# Patient Record
Sex: FEMALE | Race: WHITE | NOT HISPANIC OR LATINO | Employment: OTHER | URBAN - METROPOLITAN AREA
[De-identification: names, ages, dates, MRNs, and addresses within clinical notes are randomized per-mention and may not be internally consistent; named-entity substitution may affect disease eponyms.]

---

## 2017-10-19 ENCOUNTER — GENERIC CONVERSION - ENCOUNTER (OUTPATIENT)
Dept: OTHER | Facility: OTHER | Age: 67
End: 2017-10-19

## 2017-10-31 ENCOUNTER — ALLSCRIPTS OFFICE VISIT (OUTPATIENT)
Dept: OTHER | Facility: OTHER | Age: 67
End: 2017-10-31

## 2017-10-31 DIAGNOSIS — E03.9 HYPOTHYROIDISM: ICD-10-CM

## 2017-10-31 DIAGNOSIS — E78.5 HYPERLIPIDEMIA: ICD-10-CM

## 2017-10-31 DIAGNOSIS — Z12.31 ENCOUNTER FOR SCREENING MAMMOGRAM FOR MALIGNANT NEOPLASM OF BREAST: ICD-10-CM

## 2017-10-31 DIAGNOSIS — Z78.0 ASYMPTOMATIC MENOPAUSAL STATE: ICD-10-CM

## 2017-10-31 DIAGNOSIS — Z00.00 ENCOUNTER FOR GENERAL ADULT MEDICAL EXAMINATION WITHOUT ABNORMAL FINDINGS: ICD-10-CM

## 2017-11-01 NOTE — PROGRESS NOTES
Assessment  1  History of gastroesophageal reflux (GERD) (V12 79) (Z87 19)   2  History of Anxiety (300 00) (F41 9)   3  History of arthritis (V13 4) (Z87 39)   4  History of migraine (V12 49) (Z86 69)   5  History of osteoporosis (V13 59) (Z87 39)   6  History of urinary incontinence (V13 09) (Z87 898)   7  History of Gallbladder Surgery   8  History of Shoulder Surgery   9  History of Incisional Breast Biopsy   10  History of Tonsillectomy   11  History of Oral Surgery Tooth Extraction [place] Tooth   12  History of Knee Surgery   13  Family history of lupus erythematosus (V19 8) (Z84 0) : Mother   15  Family history of cardiac disorder (V17 49) (Z82 49) : Mother, Father   13  Family history of diabetes mellitus (V18 0) (Z83 3) : Father   12  Family history of malignant neoplasm of breast (V16 3) (Z80 3) : Maternal Aunt, Maternal    Cousin   16  Never a smoker   18  Social alcohol use (Z78 9)   19  No illicit drug use   20  Caffeine use (V49 89) (F15 90)   21  Hypothyroidism, adult (244 9) (E03 9)   22  Arthritis of knee (716 96) (M17 10)    Plan  Encounter for screening mammogram for malignant neoplasm of breast    · * MAMMO SCREENING BILATERAL W CAD; Status:Hold For - Scheduling; Requested  for:31Oct2017;   Encounter for special screening examination for genitourinary disorder    · *VB - Urinary Incontinence Screen (Dx Z13 89 Screen for UI) ; every 1 year; Last  [id number]; Next 22UNA8222; [address] Maintenance    · (1) CBC/ PLT (NO DIFF); Status:Active; Requested for:31Oct2017;    · (1) VITAMIN B12; Status:Active; Requested for:31Oct2017;   Hyperlipidemia    · (1) ALT (SGPT); Status:Active; Requested for:31Oct2017;    · (1) LIPID PANEL FASTING W DIRECT LDL REFLEX; Status:Active; Requested  for:31Oct2017;   Hypothyroidism, adult    · (1) TSH WITH FT4 REFLEX; Status:Active;  Requested for:31Oct2017;   Need for immunization against influenza    · Fluzone High-Dose 0 5 ML Intramuscular Suspension Prefilled Syringe  PMH: Encounter for screening for malignant neoplasm of colon    · COLONOSCOPY; Status:Active; Requested for:31Oct2017;   Postmenopausal    · * DXA BONE DENSITY SPINE HIP AND PELVIS; Status:Hold For - Scheduling; Requested  for:31Oct2017; Unlinked    · *VB - Fall Risk Assessment  (Dx Z13 89 Screen for Neurologic Disorder) ; every 1 year; Next 11FCB6142; Status:Active   · *VB - Urinary Incontinence Screen (Dx Z13 89 Screen for UI); Status:Complete -  Retrospective By Protocol Authorization;   Done: 95GMU5056 01:22PM   · *VB-Depression Screening ; every 1 year; Next 18BDF7558; Status:Active    Discussion/Summary  Discussion Summary:   1) thyroid : to recheck on labsincreased weight: discussed dietPt  to start B12 because ON ppi  To check levelsarthritis particularly of both knees right over left sees orthopedic to use NSAID cream and not pill because of the side effects      Counseling Documentation With Imm: The was counseled regarding diagnostic results,-- instructions for management  total time of encounter was 30 minutes  Chief Complaint  Chief Complaint Free Text Note Form: Patient is here today to establish care  work done 6/2017 at [facility] refills needed today  discuss meds      History of Present Illness  HPI: Severe arthritis using asprincream  wishes to change to local nsaids to try cream      Review of Systems  Preventive Quality 65 Older:   Preventive Quality 65 and Older: Falls Risk: The patient fell 2 times in the past 12 months   The patient is currently asymptomatic Symptoms Include: Urinary Incontinence Symptoms includes: urinary incontinence, urinary frequency and nocturia, but no incomplete bladder emptying, no urinary urgency, no urinary hesitancy, no dysuria, no straining, no weak stream, no intermittent stream, no post-void dribbling, no vaginal pressure and no vaginal dryness    Complete-Female:   Constitutional: No fever, no chills, feels well, no tiredness, no recent weight gain or weight loss  Eyes: dryness of the eyes-- and-- see eye doctor, has dry eyes, but-- No complaints of eye pain, no red eyes, no eyesight problems, no discharge, no dry eyes, no itching of eyes,-- no eye pain,-- no eyesight problems,-- eyes not red,-- no purulent discharge from the eyes-- and-- no itching of the eyes  ENT: no complaints of earache, no loss of hearing, no nose bleeds, no nasal discharge, no sore throat, no hoarseness  Cardiovascular: No complaints of slow heart rate, no fast heart rate, no chest pain, no palpitations, no leg claudication, no lower extremity edema  Respiratory: No complaints of shortness of breath, no wheezing, no cough, no SOB on exertion, no orthopnea, no PND  Gastrointestinal: vomiting-- and-- gerd , had endoscopy occ vomitting, but-- No complaints of abdominal pain, no constipation, no nausea or vomiting, no diarrhea, no bloody stools,-- no abdominal pain,-- no nausea,-- no constipation,-- no diarrhea-- and-- no blood in stools  Genitourinary: No complaints of dysuria, no incontinence, no pelvic pain, no dysmenorrhea, no vaginal discharge or bleeding  Musculoskeletal: pain see ortho, but-- no myalgias--    The patient presents with complaints of bilateral knee arthralgias  Integumentary: skin lesion-- and-- sees derm needs apt , but-- No complaints of skin rash or lesions, no itching, no skin wounds, no breast pain or lump  Neurological: No complaints of headache, no confusion, no convulsions, no numbness, no dizziness or fainting, no tingling, no limb weakness, no difficulty walking  Psychiatric: Not suicidal, no sleep disturbance, no anxiety or depression, no change in personality, no emotional problems  Endocrine: no muscle weakness,-- no hot flashes-- and-- no deepening of the voice  no feelings of weakness   Hematologic/Lymphatic: no swollen glands,-- no tendency for easy bleeding-- and-- no tendency for easy bruising  Active Problems  1  Encounter for screening mammogram for malignant neoplasm of breast (V76 12)   (Z12 31)   2  Encounter for special screening examination for genitourinary disorder (V81 6) (Z13 89)   3  Postmenopausal (V49 81) (Z78 0)    Past Medical History  1  History of Anxiety (300 00) (F41 9)   2  History of arthritis (V13 4) (Z87 39)   3  History of gastroesophageal reflux (GERD) (V12 79) (Z87 19)   4  History of migraine (V12 49) (Z86 69)   5  History of osteoporosis (V13 59) (Z87 39)   6  History of urinary incontinence (V13 09) (J70 734)    Surgical History  1  History of Gallbladder Surgery   2  History of Incisional Breast Biopsy   3  History of Knee Surgery   4  History of Oral Surgery Tooth Extraction [place] Tooth   5  History of Shoulder Surgery   6  History of Tonsillectomy    Family History  Mother    1  Family history of cardiac disorder (V17 49) (Z82 49)   2  Family history of lupus erythematosus (V19 8) (Z84 0)  Father    3  Family history of cardiac disorder (V17 49) (Z82 49)   4  Family history of diabetes mellitus (V18 0) (Z83 3)  Maternal Aunt    5  Family history of malignant neoplasm of breast (V16 3) (Z80 3)  Maternal Cousin    6  Family history of malignant neoplasm of breast (V16 3) (Z80 3)    Social History   · Caffeine use (V49 89) (F15 90)   · Never a smoker   · No illicit drug use   · Social alcohol use (Z78 9)    Current Meds   1  Aleve TABS; TAKE 2 TABLET EVERY 12 HOURS AS NEEDED; Therapy: (Recorded:31Oct2017) to Recorded   2  Aspir-81 TBEC; TAKE 1 TABLET DAILY; Therapy: (Recorded:31Oct2017) to Recorded   3  Fexofenadine HCl - 180 MG Oral Tablet; TAKE 1 TABLET DAILY; Therapy: (Recorded:31Oct2017) to Recorded   4  LORazepam 0 5 MG Oral Tablet; take 1 tablet daily as needed; Therapy: (Recorded:31Oct2017) to Recorded   5  NexIUM 40 MG Oral Capsule Delayed Release; take 1 capsule daily; Therapy: (Recorded:31Oct2017) to Recorded   6   Nystatin 483324 UNIT/GM External Cream; APPLY ONCE DAILY TO AFFECTED AREA(S)   WHEN NEEDED; Therapy: (Recorded:88Frj3577) to Recorded   7  Nystatin 998384 UNIT/GM External Powder; APPLY ONCE TIMES DAILY TO AFFECTED   AREA(S); Therapy: (Recorded:31Oct2017) to Recorded   8  Pazeo 0 7 % Ophthalmic Solution; use as directed; Therapy: (Recorded:31Oct2017) to Recorded    Allergies  1  Sulfa Drugs  2  No Known Environmental Allergies   3  No Known Food Allergies    Vitals  Signs   Recorded: 10IHB3041 01:32PM   Temperature: 98 2 F, Tympanic  Heart Rate: 64, R Radial  Pulse Quality: Regular, R Radial  Respiration: 14  Systolic: 925, RUE, Sitting  Diastolic: 70, RUE, Sitting  Height: 5 ft 1 42 in  Weight: 183 lb 12 8 oz  BMI Calculated: 34 25  BSA Calculated: 1 83  O2 Saturation: 98, RA    Physical Exam    Constitutional   General appearance: No acute distress, well appearing and well nourished  -- heavy  Eyes   Conjunctiva and lids: No swelling, erythema or discharge  Pupils and irises: Equal, round and reactive to light  Ears, Nose, Mouth, and Throat   External inspection of ears and nose: Normal     Nasal mucosa, septum, and turbinates: Normal without edema or erythema  Oropharynx: Normal with no erythema, edema, exudate or lesions  Pulmonary   Respiratory effort: No increased work of breathing or signs of respiratory distress  Auscultation of lungs: Clear to auscultation  Cardiovascular   Palpation of heart: Normal PMI, no thrills  Auscultation of heart: Normal rate and rhythm, normal S1 and S2, without murmurs  Examination of extremities for edema and/or varicosities: Normal     Carotid pulses: Normal     Abdomen   Abdomen: Non-tender, no masses  Liver and spleen: No hepatomegaly or splenomegaly  Musculoskeletal   Gait and station: Abnormal  -- Atalgic gait  Digits and nails: Normal without clubbing or cyanosis      Inspection/palpation of joints, bones, and muscles: Normal     Skin   Skin and subcutaneous tissue: Abnormal  -- To see during for several pigmented lesions  Neurologic   Cranial nerves: Cranial nerves 2-12 intact  -- mild atalgic gait  Reflexes: 2+ and symmetric  Sensation: No sensory loss  Psychiatric   Orientation to person, place, and time: Normal     Mood and affect: Normal          Results/Data  *VB - Urinary Incontinence Screen (Dx Z13 89 Screen for UI) 31Oct2017 01:22PM Jalen Hsieh     Test Name Result Flag Reference   Urinary Incontinence Assessment 31Oct2017       PHQ-2 Adult Depression Screening 31Oct2017 01:20PM User, Ahs     Test Name Result Flag Reference   PHQ-2 Adult Depression Score 0     Over the last two weeks, how often have you been bothered by any of the following problems?   Little interest or pleasure in doing things: Not at all - 0  Feeling down, depressed, or hopeless: Not at all - 0   PHQ-2 Adult Depression Screening Negative       Falls Risk Assessment (Dx Z13 89 Screen for Neurologic Disorder) 31Oct2017 01:20PM User, Ahs     Test Name Result Flag Reference   Falls Risk      2 or more falls past year       Signatures   Electronically signed by : Dariana Rodrigues MD; Oct 31 2017  2:53PM EST                       (Author)

## 2017-11-09 ENCOUNTER — GENERIC CONVERSION - ENCOUNTER (OUTPATIENT)
Dept: OTHER | Facility: OTHER | Age: 67
End: 2017-11-09

## 2017-11-20 ENCOUNTER — ALLSCRIPTS OFFICE VISIT (OUTPATIENT)
Dept: OTHER | Facility: OTHER | Age: 67
End: 2017-11-20

## 2017-11-21 NOTE — PROGRESS NOTES
Assessment    1  Allergic sinusitis (477 9) (J30 9)   2  Acute sinusitis (461 9) (J01 90)   3  Skin disorder (709 9) (L98 9)   4  Acute URI (465 9) (J06 9)    Plan  Acute sinusitis, Allergic sinusitis    · Zithromax Z-Sekou 250 MG Oral Tablet (Azithromycin); take 2 tablets day 1 then 1tablet daily x 4 days  Acute URI    · Drink at least 6 glasses of clear liquids a day ; Status:Complete;   Done: 35DOZ0773   · Good hand washing is one of the best ways to control the spread of germs  ;Status:Complete;   Done: 15JEE1230    Discussion/Summary    To continue daily allegra dailymucinex DM and take plain robutussinfluids and restdaily while on abxnext week if not improved  tried to get derm at sched - can't be seen until may - will see if we can get her in somewhere sooner - will call [facility] in [place]  Possible side effects of new medications were reviewed with the patient/guardian today  The treatment plan was reviewed with the patient/guardian  The patient/guardian understands and agrees with the treatment plan      Chief Complaint  pt  presents for chest congestion, loss of voice and headache and sinus pressure  Started Saturday  pt  has been taking Mucinex DM, helping a little  Pt takes allegra on daily basis  History of Present Illness  HPI: c/o cough and chest congestion - mucus was thick over weekend with blood tinged mucus, now clearmucinex DMfever or chillscontacts latelyrecent plane travelmyalgias and night sweats at onset, now afebrile and no chills/sweats      Review of Systems   Constitutional: no fever,-- not feeling poorly,-- no chills-- and-- not feeling tired  ENT: nasal discharge-- and-- hoarseness, but-- no earache,-- no nosebleeds-- and-- no sore throat  Cardiovascular: no chest pain,-- no intermittent leg claudication,-- no palpitations-- and-- no lower extremity edema    Respiratory: cough-- and-- shortness of breath during exertion, but-- no shortness of breath-- and-- no wheezing  Gastrointestinal: no abdominal pain,-- no nausea,-- no vomiting-- and-- no constipation  Genitourinary: no dysuria  Musculoskeletal: arthralgias-- and-- knee pain  Integumentary: no rashes  Neurological: no headache,-- no numbness-- and-- no dizziness  ROS reviewed  Active Problems    1  Arthritis of knee (716 96) (M17 10)   2  Encounter for screening mammogram for malignant neoplasm of breast (V76 12) (Z12 31)   3  Hyperlipidemia (272 4) (E78 5)   4  Hypothyroidism, adult (244 9) (E03 9)   5  Postmenopausal (V49 81) (Z78 0)    Past Medical History  Active Problems And Past Medical History Reviewed: The active problems and past medical history were reviewed and updated today  Surgical History  Surgical History Reviewed: The surgical history was reviewed and updated today  Social History     · Caffeine use (V49 89) (F15 90)   · Never a smoker   · No illicit drug use   · Social alcohol use (Z78 9)  The social history was reviewed and updated today  Family History  Family History Reviewed: The family history was reviewed and updated today  Current Meds   1  Aleve TABS; TAKE 2 TABLET EVERY 12 HOURS AS NEEDED; Therapy: (Recorded:31Oct2017) to Recorded   2  Aspir-81 TBEC; TAKE 1 TABLET DAILY; Therapy: (Recorded:31Oct2017) to Recorded   3  Diclofenac Sodium 1 % Transdermal Gel; APPLY TWICE DAILY AS NEEDED; Therapy: 46APR7175 to (Last Rx:02Nov2017)  Requested for: 58FUD1798 Ordered   4  Fexofenadine HCl - 180 MG Oral Tablet; TAKE 1 TABLET DAILY; Therapy: (Recorded:31Oct2017) to Recorded   5  LORazepam 0 5 MG Oral Tablet; take 1 tablet daily as needed; Therapy: (Recorded:31Oct2017) to Recorded   6  NexIUM 40 MG Oral Capsule Delayed Release; take 1 capsule daily; Therapy: (Recorded:31Oct2017) to Recorded   7  Nystatin 037683 UNIT/GM External Cream; APPLY ONCE DAILY TO AFFECTED AREA(S) WHEN NEEDED; Therapy: (Recorded:31Oct2017) to Recorded   8   Nystatin 927246 UNIT/GM External Powder; APPLY ONCE TIMES DAILY TO AFFECTED AREA(S); Therapy: (Recorded:31Oct2017) to Recorded   9  Pazeo 0 7 % Ophthalmic Solution; use as directed; Therapy: (Recorded:31Oct2017) to Recorded   10  Vitamin B12 TABS; TAKE 1 TABLET DAILY AS DIRECTED; Therapy: (Recorded:20Nov2017) to Recorded    The medication list was reviewed and updated today  Allergies  1  Sulfa Drugs  2  No Known Environmental Allergies   3  No Known Food Allergies    Vitals   Recorded: [id number] 02:31PM Recorded: 19XMS0867 02:07PM   Temperature  98 2 F, Tympanic   Heart Rate  76   Systolic 356, LUE, Sitting 154, LUE, Sitting   Diastolic 86, LUE, Sitting 94, LUE, Sitting   Height  5 ft 1 42 in   Weight  182 lb 2 oz   BMI Calculated  33 94   BSA Calculated  1 82   O2 Saturation  97, RA       Physical Exam   Constitutional  General appearance: No acute distress, well appearing and well nourished  Eyes  Conjunctiva and lids: No swelling, erythema or discharge  Pupils and irises: Equal, round and reactive to light  Ears, Nose, Mouth, and Throat  External inspection of ears and nose: Normal    Otoscopic examination: Tympanic membranes translucent with normal light reflex  Canals patent without erythema  Nasal mucosa, septum, and turbinates: Normal without edema or erythema  -- mild erythema  Pulmonary  Auscultation of lungs: Clear to auscultation  -- no wheeze  Cardiovascular  Auscultation of heart: Normal rate and rhythm, normal S1 and S2, without murmurs  Examination of extremities for edema and/or varicosities: Normal    Lymphatic  Palpation of lymph nodes in neck: No lymphadenopathy  Musculoskeletal  Gait and station: Normal    Skin  Skin and subcutaneous tissue: Normal without rashes or lesions  Neurologic  Cranial nerves: Cranial nerves 2-12 intact     Psychiatric  Orientation to person, place, and time: Normal    Mood and affect: Normal          Future Appointments    Date/Time Provider Specialty Site   01/31/2018 11:30 AM Paradise [facility] Jennifer Vilma       Signatures   Electronically signed by : Brianna Carrillo, [facility]; Nov 20 2017  2:43PM EST                       (Author)    Electronically signed by :  Astrid Augustine MD; Nov 20 2017  3:51PM EST                       (Author)

## 2017-12-01 ENCOUNTER — GENERIC CONVERSION - ENCOUNTER (OUTPATIENT)
Dept: INTERNAL MEDICINE CLINIC | Age: 67
End: 2017-12-01

## 2017-12-21 ENCOUNTER — GENERIC CONVERSION - ENCOUNTER (OUTPATIENT)
Dept: OTHER | Facility: OTHER | Age: 67
End: 2017-12-21

## 2017-12-21 ENCOUNTER — GENERIC CONVERSION - ENCOUNTER (OUTPATIENT)
Dept: INTERNAL MEDICINE CLINIC | Age: 67
End: 2017-12-21

## 2018-01-13 VITALS
DIASTOLIC BLOOD PRESSURE: 86 MMHG | HEART RATE: 76 BPM | WEIGHT: 182.13 LBS | HEIGHT: 61 IN | TEMPERATURE: 98.2 F | OXYGEN SATURATION: 97 % | BODY MASS INDEX: 34.39 KG/M2 | SYSTOLIC BLOOD PRESSURE: 142 MMHG

## 2018-01-13 VITALS
HEART RATE: 64 BPM | BODY MASS INDEX: 34.7 KG/M2 | OXYGEN SATURATION: 98 % | HEIGHT: 61 IN | TEMPERATURE: 98.2 F | RESPIRATION RATE: 14 BRPM | DIASTOLIC BLOOD PRESSURE: 70 MMHG | SYSTOLIC BLOOD PRESSURE: 146 MMHG | WEIGHT: 183.8 LBS

## 2018-01-20 ENCOUNTER — LAB CONVERSION - ENCOUNTER (OUTPATIENT)
Dept: OTHER | Facility: OTHER | Age: 68
End: 2018-01-20

## 2018-01-20 LAB
ALT SERPL W P-5'-P-CCNC: 13 U/L (ref 6–29)
CHOLEST SERPL-MCNC: 243 MG/DL
CHOLEST/HDLC SERPL: 3.8 (CALC)
DEPRECATED RDW RBC AUTO: 14.5 % (ref 11–15)
HCT VFR BLD AUTO: 40.6 % (ref 35–45)
HDLC SERPL-MCNC: 64 MG/DL
HGB BLD-MCNC: 12.9 G/DL (ref 11.7–15.5)
LDL CHOLESTEROL (HISTORICAL): 161 MG/DL (CALC)
MCH RBC QN AUTO: 26.8 PG (ref 27–33)
MCHC RBC AUTO-ENTMCNC: 31.8 G/DL (ref 32–36)
MCV RBC AUTO: 84.4 FL (ref 80–100)
NON-HDL-CHOL (CHOL-HDL) (HISTORICAL): 179 MG/DL (CALC)
PLATELET # BLD AUTO: 321 THOUSAND/UL (ref 140–400)
PMV BLD AUTO: 11.6 FL (ref 7.5–12.5)
RBC # BLD AUTO: 4.81 MILLION/UL (ref 3.8–5.1)
TRIGL SERPL-MCNC: 74 MG/DL
TSH SERPL DL<=0.05 MIU/L-ACNC: 3.33 MIU/L (ref 0.4–4.5)
WBC # BLD AUTO: 11.7 THOUSAND/UL (ref 3.8–10.8)

## 2018-01-23 DIAGNOSIS — E78.5 HYPERLIPIDEMIA: ICD-10-CM

## 2018-01-23 DIAGNOSIS — D72.9 DISORDER OF WHITE BLOOD CELLS: ICD-10-CM

## 2018-01-31 DIAGNOSIS — M17.10 PRIMARY LOCALIZED OSTEOARTHROSIS OF LOWER LEG, UNSPECIFIED LATERALITY: Primary | ICD-10-CM

## 2018-01-31 DIAGNOSIS — F40.243 FEAR OF FLYING: ICD-10-CM

## 2018-01-31 DIAGNOSIS — Z86.19 HISTORY OF FUNGAL SKIN INFECTION: ICD-10-CM

## 2018-01-31 RX ORDER — NYSTATIN 100000 [USP'U]/G
POWDER TOPICAL
COMMUNITY
End: 2018-01-31 | Stop reason: SDUPTHER

## 2018-01-31 RX ORDER — NYSTATIN 100000 [USP'U]/G
POWDER TOPICAL DAILY
Qty: 15 G | Refills: 1 | Status: SHIPPED | OUTPATIENT
Start: 2018-01-31 | End: 2018-05-09 | Stop reason: DRUGHIGH

## 2018-01-31 RX ORDER — LORAZEPAM 0.5 MG/1
0.5 TABLET ORAL DAILY PRN
Qty: 30 TABLET | Refills: 0 | Status: SHIPPED | OUTPATIENT
Start: 2018-01-31 | End: 2018-06-28 | Stop reason: SDUPTHER

## 2018-01-31 RX ORDER — LORAZEPAM 0.5 MG/1
1 TABLET ORAL DAILY PRN
COMMUNITY
End: 2018-01-31 | Stop reason: SDUPTHER

## 2018-01-31 NOTE — TELEPHONE ENCOUNTER
Patient informed that prescription is ready for  in the [facility] [place] office

## 2018-02-19 ENCOUNTER — TELEPHONE (OUTPATIENT)
Dept: INTERNAL MEDICINE CLINIC | Age: 68
End: 2018-02-19

## 2018-02-19 RX ORDER — ESOMEPRAZOLE MAGNESIUM 40 MG/1
1 CAPSULE, DELAYED RELEASE ORAL DAILY
COMMUNITY
End: 2018-03-08 | Stop reason: SDUPTHER

## 2018-02-19 RX ORDER — NYSTATIN 100000 U/G
CREAM TOPICAL
COMMUNITY
End: 2018-05-09 | Stop reason: DRUGHIGH

## 2018-02-19 RX ORDER — AZITHROMYCIN 250 MG/1
TABLET, FILM COATED ORAL DAILY
COMMUNITY
Start: 2017-11-20 | End: 2018-07-25 | Stop reason: ALTCHOICE

## 2018-02-19 RX ORDER — FEXOFENADINE HCL 180 MG/1
1 TABLET ORAL AS NEEDED
COMMUNITY
End: 2020-07-01 | Stop reason: SDUPTHER

## 2018-02-19 RX ORDER — PREDNISONE 10 MG/1
TABLET ORAL
COMMUNITY
Start: 2017-11-22 | End: 2018-07-25 | Stop reason: ALTCHOICE

## 2018-02-19 RX ORDER — NAPROXEN SODIUM 220 MG
220 TABLET ORAL DAILY PRN
COMMUNITY

## 2018-02-19 NOTE — TELEPHONE ENCOUNTER
Patient called and stated that she was on vacation in the [facility] last week and was hospitalized from February 11th at night and released on the 13th, they diagnosed her symptoms as a kidney/bladder infection which caused gastrinites and dehydration  She wanted to inform you before she had her 3m follow up visit with you tomorrow afternoon  She would like a call back if you receive this message before seeing her  Thank you

## 2018-02-20 ENCOUNTER — OFFICE VISIT (OUTPATIENT)
Dept: INTERNAL MEDICINE CLINIC | Facility: CLINIC | Age: 68
End: 2018-02-20
Payer: MEDICARE

## 2018-02-20 VITALS
HEIGHT: 61 IN | OXYGEN SATURATION: 98 % | TEMPERATURE: 97.7 F | DIASTOLIC BLOOD PRESSURE: 70 MMHG | HEART RATE: 68 BPM | BODY MASS INDEX: 33.64 KG/M2 | SYSTOLIC BLOOD PRESSURE: 136 MMHG | WEIGHT: 178.2 LBS

## 2018-02-20 DIAGNOSIS — R10.30 LOWER ABDOMINAL PAIN: Primary | ICD-10-CM

## 2018-02-20 DIAGNOSIS — Z12.11 SCREENING FOR COLON CANCER: ICD-10-CM

## 2018-02-20 DIAGNOSIS — R79.89 ABNORMAL LFTS: ICD-10-CM

## 2018-02-20 LAB
BACTERIA UR QL AUTO: ABNORMAL /HPF
BILIRUB UR QL STRIP: NEGATIVE
CLARITY UR: CLEAR
COLOR UR: YELLOW
GLUCOSE UR STRIP-MCNC: NEGATIVE MG/DL
HGB UR QL STRIP.AUTO: NEGATIVE
HYALINE CASTS #/AREA URNS LPF: ABNORMAL /LPF
KETONES UR STRIP-MCNC: NEGATIVE MG/DL
LEUKOCYTE ESTERASE UR QL STRIP: ABNORMAL
NITRITE UR QL STRIP: NEGATIVE
NON-SQ EPI CELLS URNS QL MICRO: ABNORMAL /HPF
PH UR STRIP.AUTO: 6 [PH] (ref 4.5–8)
PROT UR STRIP-MCNC: NEGATIVE MG/DL
RBC #/AREA URNS AUTO: ABNORMAL /HPF
SL AMB  POCT GLUCOSE, UA: NEGATIVE
SL AMB LEUKOCYTE ESTERASE,UA: ABNORMAL
SL AMB POCT BILIRUBIN,UA: NEGATIVE
SL AMB POCT BLOOD,UA: ABNORMAL
SL AMB POCT CLARITY,UA: CLEAR
SL AMB POCT COLOR,UA: CLEAR
SL AMB POCT KETONES,UA: NEGATIVE
SL AMB POCT NITRITE,UA: NEGATIVE
SL AMB POCT PH,UA: 5.5
SL AMB POCT SPECIFIC GRAVITY,UA: 1.01
SL AMB POCT URINE PROTEIN: NEGATIVE
SL AMB POCT UROBILINOGEN: 0.2
SP GR UR STRIP.AUTO: 1.01 (ref 1–1.03)
UROBILINOGEN UR QL STRIP.AUTO: 0.2 E.U./DL
WBC #/AREA URNS AUTO: ABNORMAL /HPF

## 2018-02-20 PROCEDURE — 99214 OFFICE O/P EST MOD 30 MIN: CPT | Performed by: INTERNAL MEDICINE

## 2018-02-20 PROCEDURE — 81001 URINALYSIS AUTO W/SCOPE: CPT | Performed by: INTERNAL MEDICINE

## 2018-02-20 PROCEDURE — 81002 URINALYSIS NONAUTO W/O SCOPE: CPT | Performed by: INTERNAL MEDICINE

## 2018-02-20 PROCEDURE — 87086 URINE CULTURE/COLONY COUNT: CPT | Performed by: INTERNAL MEDICINE

## 2018-02-20 NOTE — PROGRESS NOTES
Assessment/Plan:    1  Abdominal Pain  She was recently admitted in a hospital in [facility] for her abdominal pain  She was treated with a 5 day course of Ciprofloxacin, Aero-OM and Ponstan  Liver Function tests were ordered and will be drawn prior to her next appointment  A Stool test for culture, ova, parasite, and white cells will also be drawn prior to her next appointment  A CT scan of the abdomen with and without contrast was also ordered  2  Weight  She was advised to lose 10% of her weight due to her fatty liver and go on a low fat diet  3  UA was performed today which showed trace blood without nitrates was sent for culture  4  Dietary Discussion  Her diet was discussed today in the context of her current abdominal pain  She was recommended to eliminate milk, coffee, chocolate, raw green vegetables, and citrus fruits  She was also encouraged to eat a lot of yogurt  5  Health Maintenance  She will follow up with us in 1 week  Diagnoses and all orders for this visit:    Lower abdominal pain  -     Comprehensive metabolic panel; Future  -     Cancel: CT abdomen pelvis w contrast; Future  -     CT abdomen pelvis w wo contrast; Future  -     Ambulatory referral to Gastroenterology; Future  -     Stool Enteric Bacterial Panel by PCR; Future  -     Ova and parasite examination; Future  -     White Blood Cells, Stool by Gram Stain; Future  -     POCT urine dip    Abnormal LFTs  -     Hepatic function panel; Future    Screening for colon cancer  -     Ambulatory referral to Gastroenterology; Future    Other orders  -     naproxen sodium (ALEVE) 220 MG tablet; Take by mouth  -     predniSONE 10 mg tablet; Take by mouth  -     azithromycin (ZITHROMAX Z-JENN) 250 mg tablet; Take by mouth daily  -     fexofenadine (ALLEGRA) 180 MG tablet; Take 1 tablet by mouth daily  -     esomeprazole (NEXIUM) 40 MG capsule;  Take 1 capsule by mouth daily  -     nystatin (MYCOSTATIN) cream; Apply topically Subjective:      Patient ID: Sandra Mcarthur is a 75 y o  female  Jamie Jesus is a 75year old female who presents today for follow up after her recent hospitalization for abdominal pain in the [facility] while on vacation  She reports the doctors in the [facility] told her she had a UTI which caused nephritis which caused gastritis  She was admitted to the hospital and treated with a 5 day course of Ciprofloxacin, and Aero-OM and Ponstan  She reports she has diarrhea and cramps ascending from the stomach and around to her back  She also feels somewhat depressed since her return from the [facility] due to her experience at the hospital          The following portions of the patient's history were reviewed and updated as appropriate: allergies, current medications, past family history, past medical history, past social history, past surgical history and problem list     Review of Systems   Constitutional: Positive for activity change, appetite change, fatigue and unexpected weight change  Negative for chills, diaphoresis and fever  HENT: Negative for congestion, drooling, ear discharge, ear pain, facial swelling, hearing loss and mouth sores  Eyes: Negative for photophobia, pain, discharge, redness, itching and visual disturbance  Respiratory: Negative for cough, choking, chest tightness, shortness of breath, wheezing and stridor  Cardiovascular: Negative for chest pain, palpitations and leg swelling  Gastrointestinal: Positive for abdominal distention, abdominal pain, diarrhea and nausea  Negative for anal bleeding, blood in stool, constipation, rectal pain and vomiting  Genitourinary: Positive for flank pain  Negative for decreased urine volume, hematuria, pelvic pain and urgency  Musculoskeletal: Positive for arthralgias and back pain  Negative for gait problem, joint swelling, myalgias, neck pain and neck stiffness  Neurological: Negative    Negative for dizziness, tremors, syncope and speech difficulty  Hematological: Negative for adenopathy  Psychiatric/Behavioral: Negative  Objective:      /70 (BP Location: Left arm, Patient Position: Sitting, Cuff Size: Large)   Pulse 68   Temp 97 7 °F (36 5 °C) (Oral)   Ht 5' 0 87" (1 546 m)   Wt 80 8 kg (178 lb 3 2 oz)   SpO2 98%   BMI 33 82 kg/m²          Physical Exam   Constitutional: She is oriented to person, place, and time  She appears well-developed and well-nourished  She appears distressed (abdominal pain)  HENT:   Head: Normocephalic and atraumatic  Right Ear: External ear normal    Left Ear: External ear normal    Eyes: Conjunctivae and EOM are normal  Pupils are equal, round, and reactive to light  Neck: Normal range of motion  Neck supple  Cardiovascular: Normal rate, regular rhythm, normal heart sounds and intact distal pulses  Pulmonary/Chest: No respiratory distress  She has no wheezes  She has no rales  She exhibits no tenderness  Abdominal: She exhibits no distension and no mass  There is tenderness (diffuse no rebound, abdomen is soft)  There is no rebound and no guarding  Musculoskeletal: She exhibits no edema, tenderness or deformity  Neurological: She is alert and oriented to person, place, and time  She has normal reflexes  Coordination normal    Skin: Skin is warm and dry  No rash noted  No erythema  No pallor  Scribe Signature and Attestaion:  By signing my name below, Awais Zay attest that this documentation has been prepared under the direction and in the presence of Dr Rancho Rowe MD  Electronically signed: Karen Roy  2/20/18  Provider Attestation:  I, Dr Rancho Rowe, personally performed the services described in this documentation  All medical record entries made by the kingaibacacia were at my direction and in my presence   I have reviewed the chart and discharge instructions (if applicable) and agree that the record reflects my personal performance and is accurate and complete   Dr Clark Guerrero MD  2/20/18

## 2018-02-22 ENCOUNTER — TELEPHONE (OUTPATIENT)
Dept: INTERNAL MEDICINE CLINIC | Age: 68
End: 2018-02-22

## 2018-02-22 ENCOUNTER — HOSPITAL ENCOUNTER (OUTPATIENT)
Dept: CT IMAGING | Facility: HOSPITAL | Age: 68
Discharge: HOME | End: 2018-02-22
Payer: MEDICARE

## 2018-02-22 DIAGNOSIS — R10.30 LOWER ABDOMINAL PAIN: ICD-10-CM

## 2018-02-22 DIAGNOSIS — N39.0 URINARY TRACT INFECTION WITHOUT HEMATURIA, SITE UNSPECIFIED: Primary | ICD-10-CM

## 2018-02-22 LAB — BACTERIA UR CULT: ABNORMAL

## 2018-02-22 PROCEDURE — 74178 CT ABD&PLV WO CNTR FLWD CNTR: CPT

## 2018-02-22 RX ADMIN — IOHEXOL 100 ML: 350 INJECTION, SOLUTION INTRAVENOUS at 07:17

## 2018-02-22 NOTE — TELEPHONE ENCOUNTER
I spoke to the patient and the related that her urine culture only revealed 10,000 colonies which was not considered to be positive  And therefore I do not feel she needs antibiotics at this time  But because of her severe urinary sepsis from several weeks ago I advised her to repeat her urinalysis if they still showed the white cells in the urine I will reconsider giving her another course of antibiotics

## 2018-02-23 NOTE — TELEPHONE ENCOUNTER
Message left for patient on her listed home phone voicemail informing her that an order for the repeat urinalysis is ready for  at the [facility] office

## 2018-02-25 LAB
ALBUMIN SERPL-MCNC: 4.1 G/DL (ref 3.6–5.1)
ALBUMIN SERPL-MCNC: 4.1 G/DL (ref 3.6–5.1)
ALBUMIN/GLOB SERPL: 1.4 (CALC) (ref 1–2.5)
ALBUMIN/GLOB SERPL: 1.4 (CALC) (ref 1–2.5)
ALP SERPL-CCNC: 94 U/L (ref 33–130)
ALP SERPL-CCNC: 94 U/L (ref 33–130)
ALT SERPL-CCNC: 13 U/L (ref 6–29)
ALT SERPL-CCNC: 13 U/L (ref 6–29)
APPEARANCE UR: CLEAR
AST SERPL-CCNC: 8 U/L (ref 10–35)
AST SERPL-CCNC: 8 U/L (ref 10–35)
BACTERIA UR QL AUTO: ABNORMAL /HPF
BILIRUB DIRECT SERPL-MCNC: 0.1 MG/DL
BILIRUB INDIRECT SERPL-MCNC: 0.3 MG/DL (CALC) (ref 0.2–1.2)
BILIRUB SERPL-MCNC: 0.4 MG/DL (ref 0.2–1.2)
BILIRUB SERPL-MCNC: 0.4 MG/DL (ref 0.2–1.2)
BILIRUB UR QL STRIP: NEGATIVE
BUN SERPL-MCNC: 28 MG/DL (ref 7–25)
BUN/CREAT SERPL: 20 (CALC) (ref 6–22)
CALCIUM SERPL-MCNC: 9.3 MG/DL (ref 8.6–10.4)
CHLORIDE SERPL-SCNC: 107 MMOL/L (ref 98–110)
CO2 SERPL-SCNC: 24 MMOL/L (ref 20–31)
COLOR UR: YELLOW
CREAT SERPL-MCNC: 1.38 MG/DL (ref 0.5–0.99)
GLOBULIN SER CALC-MCNC: 3 G/DL (CALC) (ref 1.9–3.7)
GLOBULIN SER CALC-MCNC: 3 G/DL (CALC) (ref 1.9–3.7)
GLUCOSE SERPL-MCNC: 94 MG/DL (ref 65–99)
GLUCOSE UR QL STRIP: NEGATIVE
HGB UR QL STRIP: NEGATIVE
HYALINE CASTS #/AREA URNS LPF: ABNORMAL /LPF
KETONES UR QL STRIP: NEGATIVE
LEUKOCYTE ESTERASE UR QL STRIP: ABNORMAL
NITRITE UR QL STRIP: NEGATIVE
PH UR STRIP: 6 [PH] (ref 5–8)
POTASSIUM SERPL-SCNC: 4.2 MMOL/L (ref 3.5–5.3)
PROT SERPL-MCNC: 7.1 G/DL (ref 6.1–8.1)
PROT SERPL-MCNC: 7.1 G/DL (ref 6.1–8.1)
PROT UR QL STRIP: NEGATIVE
RBC #/AREA URNS HPF: ABNORMAL /HPF
SL AMB EGFR AFRICAN AMERICAN: 45 ML/MIN/1.73M2
SL AMB EGFR NON AFRICAN AMERICAN: 39 ML/MIN/1.73M2
SODIUM SERPL-SCNC: 142 MMOL/L (ref 135–146)
SP GR UR STRIP: 1.02 (ref 1–1.03)
SQUAMOUS #/AREA URNS HPF: ABNORMAL /HPF
WBC #/AREA URNS HPF: ABNORMAL /HPF

## 2018-02-27 ENCOUNTER — TELEPHONE (OUTPATIENT)
Dept: INTERNAL MEDICINE CLINIC | Facility: CLINIC | Age: 68
End: 2018-02-27

## 2018-02-27 DIAGNOSIS — N18.2 CRI (CHRONIC RENAL INSUFFICIENCY), STAGE 2 (MILD): ICD-10-CM

## 2018-02-27 DIAGNOSIS — Z12.11 SCREENING FOR COLON CANCER: ICD-10-CM

## 2018-02-27 DIAGNOSIS — B34.9 VIREMIA: Primary | ICD-10-CM

## 2018-02-27 NOTE — PROGRESS NOTES
Discussed the results of CT scan with patient  There are no significant changes except for mild swelling of the mesenteric area  Which could account for abdominal pain  This was quite nonspecific  Patient is improved she still is quite fatigued she has no further loose stools  Patient will be seen soon and her laboratory studies will be Re done    In particular she had some mild renal insufficiency secondary to dehydration will recheck numbers before next visit

## 2018-02-27 NOTE — TELEPHONE ENCOUNTER
Needs script for additional bw per Dr Obie Castleman    Please fax to [facility]    Please call her when it is sent so she knows she can go  Is this a fasting test

## 2018-02-27 NOTE — TELEPHONE ENCOUNTER
Dr Nargis Orellana placed orders for CBC with diff, electrolytes, renal function - prior to next appointment  Also referral for gastroenterology  Patient does NOT need to fast   If she goes to [facility] lab orders are in chart  If she goes elsewhere, please print orders  Please call patient back with above  I did not call her

## 2018-02-27 NOTE — TELEPHONE ENCOUNTER
I have printed out the orders: Patient needs orders faxed to [phone] [address]   i can not find a [facility] [address]  I lmom for patient to call back advising what [facility] facility she goes to

## 2018-03-01 LAB
ALBUMIN SERPL-MCNC: 4.1 G/DL (ref 3.6–5.1)
BASOPHILS # BLD AUTO: 91 CELLS/UL (ref 0–200)
BASOPHILS NFR BLD AUTO: 0.9 %
BUN SERPL-MCNC: 19 MG/DL (ref 7–25)
BUN/CREAT SERPL: 18 (CALC) (ref 6–22)
CALCIUM SERPL-MCNC: 9.4 MG/DL (ref 8.6–10.4)
CHLORIDE SERPL-SCNC: 107 MMOL/L (ref 98–110)
CO2 SERPL-SCNC: 28 MMOL/L (ref 20–31)
CREAT SERPL-MCNC: 1.08 MG/DL (ref 0.5–0.99)
EOSINOPHIL # BLD AUTO: 394 CELLS/UL (ref 15–500)
EOSINOPHIL NFR BLD AUTO: 3.9 %
ERYTHROCYTE [DISTWIDTH] IN BLOOD BY AUTOMATED COUNT: 13.6 % (ref 11–15)
GLUCOSE SERPL-MCNC: 116 MG/DL (ref 65–99)
HCT VFR BLD AUTO: 38.8 % (ref 35–45)
HGB BLD-MCNC: 12.4 G/DL (ref 11.7–15.5)
LYMPHOCYTES # BLD AUTO: 3363 CELLS/UL (ref 850–3900)
LYMPHOCYTES NFR BLD AUTO: 33.3 %
MCH RBC QN AUTO: 26.7 PG (ref 27–33)
MCHC RBC AUTO-ENTMCNC: 32 G/DL (ref 32–36)
MCV RBC AUTO: 83.6 FL (ref 80–100)
MONOCYTES # BLD AUTO: 535 CELLS/UL (ref 200–950)
MONOCYTES NFR BLD AUTO: 5.3 %
NEUTROPHILS # BLD AUTO: 5717 CELLS/UL (ref 1500–7800)
NEUTROPHILS NFR BLD AUTO: 56.6 %
PHOSPHATE SERPL-MCNC: 3.4 MG/DL (ref 2.1–4.3)
PLATELET # BLD AUTO: 311 THOUSAND/UL (ref 140–400)
PMV BLD REES-ECKER: 12.2 FL (ref 7.5–12.5)
POTASSIUM SERPL-SCNC: 3.7 MMOL/L (ref 3.5–5.3)
RBC # BLD AUTO: 4.64 MILLION/UL (ref 3.8–5.1)
SL AMB EGFR AFRICAN AMERICAN: 61 ML/MIN/1.73M2
SL AMB EGFR NON AFRICAN AMERICAN: 53 ML/MIN/1.73M2
SODIUM SERPL-SCNC: 144 MMOL/L (ref 135–146)
WBC # BLD AUTO: 10.1 THOUSAND/UL (ref 3.8–10.8)

## 2018-03-08 ENCOUNTER — OFFICE VISIT (OUTPATIENT)
Dept: INTERNAL MEDICINE CLINIC | Age: 68
End: 2018-03-08

## 2018-03-08 VITALS
HEIGHT: 61 IN | WEIGHT: 176.8 LBS | DIASTOLIC BLOOD PRESSURE: 70 MMHG | BODY MASS INDEX: 33.38 KG/M2 | OXYGEN SATURATION: 97 % | TEMPERATURE: 98.3 F | HEART RATE: 78 BPM | SYSTOLIC BLOOD PRESSURE: 146 MMHG

## 2018-03-08 DIAGNOSIS — R82.998 URINE WBC INCREASED: Primary | ICD-10-CM

## 2018-03-08 PROCEDURE — 99213 OFFICE O/P EST LOW 20 MIN: CPT | Performed by: INTERNAL MEDICINE

## 2018-03-08 RX ORDER — LANOLIN ALCOHOL/MO/W.PET/CERES
1000 CREAM (GRAM) TOPICAL DAILY
COMMUNITY

## 2018-03-08 RX ORDER — MELATONIN
1000 DAILY
COMMUNITY

## 2018-03-08 NOTE — PROGRESS NOTES
Assessment/Plan:    1  Abdominal Pain  She reported to us on 2/20/18 regarding abdominal pain after a recent trip to [facility]  Her renal function panel reported an increased Sl Amb Glucose level at 116 on 2/28/18  UA showed trace leukocyte esterase in the urine and elevated WBC in the urine on 2/24/18  Her CT scan on 2/22/18 showed a Mild central mesenteric edema with tiny associated lymph nodes which is a very nonspecific finding and could be present on a reactive basis  No other potential acute inflammatory changes in the abdomen or pelvis  No obstructive uropathy  We will repeat another Urine culture to assess her again  Pt  Still mildly dehydrated  2  Hypertension  She was instructed to eliminate salt from her diet, including potato chips and pretzels  She was advised to eliminate caffeine completely, but only drink 1 cup decaf tea daily if needed  She could also add apple juice or low-salt V8 Juice to her diet  3  Health Maintenance  She will follow up with us at her next scheduled appointment  Diagnoses and all orders for this visit:    Urine WBC increased  -     UA w Reflex to Microscopic w Reflex to Culture - Clinic Collect    Other orders  -     cyanocobalamin (VITAMIN B-12) 1,000 mcg tablet; Take 1,000 mcg by mouth daily  -     cholecalciferol (VITAMIN D3) 1,000 units tablet; Take 1,000 Units by mouth daily          Subjective:      Patient ID: Steph Cruz is a 75 y o  female  Suzie Da Silva is a 75year old female who presents today for a 2 week follow up regarding her abdominal pain  She reports she feels much better in regards to her abdominal pain  She notes that she lost about 10 lbs  due to this infection  She reports her BP was high today but attributes that to her usage of caffeine before her appointment today  She notes she has general fatigue, polyuria, swelling of her knees, and general back pain  She follows with an eye doctor closely          The following portions of the patient's history were reviewed and updated as appropriate: allergies, current medications, past family history, past medical history, past social history, past surgical history and problem list     Review of Systems   Constitutional: Positive for unexpected weight change (lost  10 lbs  with illness)  Negative for fatigue (always) and fever  HENT: Negative for congestion, hearing loss, mouth sores, postnasal drip, rhinorrhea and sinus pain  Eyes: Negative for pain, discharge, redness and itching  Respiratory: Negative for cough, choking, chest tightness, shortness of breath, wheezing and stridor  Cardiovascular: Negative for chest pain, palpitations and leg swelling  Gastrointestinal: Negative for abdominal pain, anal bleeding, blood in stool, constipation, diarrhea, nausea, rectal pain and vomiting  Genitourinary: Positive for frequency  Negative for difficulty urinating, dysuria, hematuria and pelvic pain  Musculoskeletal: Positive for back pain (chronic) and joint swelling (B/L knees)  Negative for myalgias, neck pain and neck stiffness  Neurological: Negative for dizziness, tremors, seizures, syncope, speech difficulty, weakness, light-headedness, numbness and headaches  Hematological: Negative for adenopathy  Psychiatric/Behavioral: Negative  Objective:      /70 (BP Location: Left arm, Patient Position: Sitting, Cuff Size: Large)   Pulse 78   Temp 98 3 °F (36 8 °C) (Oral)   Ht 5' 1 38" (1 559 m)   Wt 80 2 kg (176 lb 12 8 oz)   SpO2 97% Comment: Room Air  BMI 33 00 kg/m²          Physical Exam   Constitutional: She is oriented to person, place, and time  She appears well-developed and well-nourished  HENT:   Head: Normocephalic and atraumatic  Eyes: Conjunctivae and EOM are normal    Neck: Normal range of motion  Neck supple  No tracheal deviation present  No thyromegaly present  Cardiovascular: Normal rate, regular rhythm and normal heart sounds  Pulmonary/Chest: Effort normal and breath sounds normal  No respiratory distress  She has no wheezes  She has no rales  She exhibits no tenderness  Abdominal: She exhibits no distension and no mass  There is no tenderness  There is no rebound and no guarding  Musculoskeletal: She exhibits no edema or deformity  Neurological: She is alert and oriented to person, place, and time  She has normal reflexes  No cranial nerve deficit  Coordination normal    Skin: Skin is warm and dry  No rash noted  No erythema  No pallor  Psychiatric: She has a normal mood and affect  Her behavior is normal  Judgment and thought content normal          Scribe Signature and Attestation:  By signing my name below, Alireza Wood attest that this documentation has been prepared under the direction and in the presence of Dr Fitz Oliver MD  Electronically signed: Karen Gonzalez  3/8/18    Provider Attestation:  I, Dr Fitz Oliver, personally performed the services described in this documentation  All medical record entries made by the scribacacia were at my direction and in my presence  I have reviewed the chart and discharge instructions (if applicable) and agree that the record reflects my personal performance and is accurate and complete  Dr Fitz Oliver MD  3/8/18

## 2018-03-08 NOTE — PATIENT INSTRUCTIONS
1  We will repeat another Urine culture to assess her again  2  She was instructed to eliminate salt from her diet, including potato chips and pretzels  She was advised to eliminate caffeine completely, but only drink 1 cup decaf tea daily if needed  She could also add apple juice or low-salt V8 Juice to her diet  3  She will follow up with us at her next scheduled appointment

## 2018-03-09 ENCOUNTER — APPOINTMENT (OUTPATIENT)
Dept: LAB | Facility: HOSPITAL | Age: 68
End: 2018-03-09
Payer: MEDICARE

## 2018-03-09 ENCOUNTER — TELEPHONE (OUTPATIENT)
Dept: INTERNAL MEDICINE CLINIC | Facility: CLINIC | Age: 68
End: 2018-03-09

## 2018-03-09 LAB
BACTERIA UR QL AUTO: ABNORMAL /HPF
BILIRUB UR QL STRIP: NEGATIVE
CLARITY UR: CLEAR
COLOR UR: YELLOW
GLUCOSE UR STRIP-MCNC: NEGATIVE MG/DL
HGB UR QL STRIP.AUTO: NEGATIVE
HYALINE CASTS #/AREA URNS LPF: ABNORMAL /LPF
KETONES UR STRIP-MCNC: NEGATIVE MG/DL
LEUKOCYTE ESTERASE UR QL STRIP: ABNORMAL
NITRITE UR QL STRIP: NEGATIVE
NON-SQ EPI CELLS URNS QL MICRO: ABNORMAL /HPF
PH UR STRIP.AUTO: 6 [PH] (ref 4.5–8)
PROT UR STRIP-MCNC: NEGATIVE MG/DL
RBC #/AREA URNS AUTO: ABNORMAL /HPF
SP GR UR STRIP.AUTO: 1.02 (ref 1–1.03)
UROBILINOGEN UR QL STRIP.AUTO: 0.2 E.U./DL
WBC #/AREA URNS AUTO: ABNORMAL /HPF

## 2018-03-09 PROCEDURE — 81001 URINALYSIS AUTO W/SCOPE: CPT | Performed by: INTERNAL MEDICINE

## 2018-03-09 NOTE — TELEPHONE ENCOUNTER
New patient we for culture  Her urinalysis again showed white cells no blood  If the culture is positive will Rx again with antibiotics    At this time I feel the patient she should see a urologist will let her know after cultures back

## 2018-05-08 DIAGNOSIS — Z12.11 SCREENING FOR COLON CANCER: Primary | ICD-10-CM

## 2018-05-09 ENCOUNTER — OFFICE VISIT (OUTPATIENT)
Dept: INTERNAL MEDICINE CLINIC | Age: 68
End: 2018-05-09
Payer: MEDICARE

## 2018-05-09 VITALS
WEIGHT: 178.4 LBS | SYSTOLIC BLOOD PRESSURE: 146 MMHG | HEART RATE: 72 BPM | TEMPERATURE: 98.5 F | BODY MASS INDEX: 32.83 KG/M2 | HEIGHT: 62 IN | DIASTOLIC BLOOD PRESSURE: 78 MMHG | OXYGEN SATURATION: 96 %

## 2018-05-09 DIAGNOSIS — R01.1 CARDIAC MURMUR: ICD-10-CM

## 2018-05-09 DIAGNOSIS — R55 NEAR SYNCOPE: ICD-10-CM

## 2018-05-09 DIAGNOSIS — B49 FUNGAL INFECTION: Primary | ICD-10-CM

## 2018-05-09 DIAGNOSIS — E66.9 OBESITY (BMI 30-39.9): ICD-10-CM

## 2018-05-09 PROCEDURE — 93000 ELECTROCARDIOGRAM COMPLETE: CPT | Performed by: INTERNAL MEDICINE

## 2018-05-09 PROCEDURE — 99214 OFFICE O/P EST MOD 30 MIN: CPT | Performed by: INTERNAL MEDICINE

## 2018-05-09 RX ORDER — NYSTATIN 100000 [USP'U]/G
POWDER TOPICAL 3 TIMES DAILY
Qty: 56.7 G | Refills: 0 | Status: SHIPPED | OUTPATIENT
Start: 2018-05-09 | End: 2019-02-14 | Stop reason: SDUPTHER

## 2018-05-09 NOTE — PATIENT INSTRUCTIONS
1  She will have a TSH, CBC, CMP, and Sed Rate drawn at her earliest convenience to assess her near-syncope episode  2  She will schedule an appointment with Dr Stacia Wilson for further evaluation of her murmur  3  She will have an Echo completed at her earliest convenience  4  She will continue using Nystatin powder as needed  5   She was advised to minimize processed foods and increase natural foods in her diet  She was encouraged to minimize her white starches and carbohydrates from her diet  6  She will have an HgbA1C level drawn prior to her next appointment  7  She will follow up with us in 8 weeks

## 2018-05-09 NOTE — PROGRESS NOTES
Assessment/Plan:    1  Near Syncope episode and Light-headedness  She reports near-syncopal episodes of light headedness occasionally for the past 3 weeks  She has not felt this way before  Her neurological exam was normal including cerebellar  We spoke to her cardiologist, Dr Henry Olmos, who will call her and schedule an appointment with her  Physical exam revealed a cardiac murmur  Her Standing BP was 146/78 when rechecked today  Her ECG performed in the office showed regular sinus rhythm, essentially normal   She will have a TSH, CBC, CMP, and Sed Rate drawn at her earliest convenience to assess her near-syncope episode  2  Cardiac Murmur  A soft murmur was heard on auscultation today  She will have an echo completed at her earliest convenience and then follow this with her cardiologist  She has a history of LVH  3  Obesity  She is obese with a BMI of 33 16  She was advised to minimize processed foods and increase natural foods in her diet  She was encouraged to minimize her white starches and carbohydrates from her diet  She will have an HgbA1C level drawn prior to her next appointment  4  Fungal infection  She was given a refill for her Nystatin powder as she continues to use it  5  Health Maintenance  She will follow up with us in 8 weeks  Diagnoses and all orders for this visit:    Fungal infection  -     nystatin (MYCOSTATIN) powder; Apply topically 3 (three) times a day    Obesity (BMI 30-39 9)  -     TSH, 3rd generation with T4 reflex; Future  -     HEMOGLOBIN A1C W/ EAG ESTIMATION; Future    Near syncope  -     TSH, 3rd generation with T4 reflex; Future  -     CBC and differential; Future  -     Comprehensive metabolic panel; Future  -     Sedimentation rate, automated; Future    Cardiac murmur  -     Echo complete with contrast if indicated; Future  -     POCT ECG          Subjective:      Patient ID: Gladys Anthony is a 75 y o  female      Jhonatan Bai is a 75year old female who presents today for a 2 month follow up regarding her HTN  She reports she has been feeling light-headedness occasionally for the past 3 weeks  She reports it is spontaneous and does not come with any specific activities or movements  She reports she has never felt this before  She notes it may be because she does not watch her diet especially when she is busy working  She reports she feels anxious when this happens and denies general palpitations  She reports she has frequency in urination due to her incontinence  She notes she saw Dr Emilee Rosario who removed some skin lesions off her body and will continue to follow with him regularly  She reports she has joint pains and has swollen feet sometimes  She notes her orthopedic specialist told her that she needs her right knee replaced  She follows with her eye doctor regularly  She follows with her cardiologist, Dr Trudy Hester, regularly as well  The following portions of the patient's history were reviewed and updated as appropriate: allergies, current medications, past family history, past medical history, past social history, past surgical history and problem list     Review of Systems   Constitutional: Negative for chills, diaphoresis, fatigue, fever and unexpected weight change  HENT: Negative for congestion, facial swelling, hearing loss, mouth sores, postnasal drip, rhinorrhea and sinus pressure  Eyes: Negative for pain, discharge, redness and itching  Respiratory: Negative  Cardiovascular: Negative  Gastrointestinal: Negative  Endocrine: Negative for polydipsia  Genitourinary: Positive for frequency (incontinant)  Negative for difficulty urinating and dysuria  Musculoskeletal: Positive for arthralgias (knees )  Negative for myalgias  Skin:        Saw Dr Ildefonso Tafoya   Allergic/Immunologic: Negative for environmental allergies  Neurological: Positive for light-headedness (near syncope)   Negative for dizziness, tremors, seizures, syncope, facial asymmetry, speech difficulty, weakness, numbness and headaches  Hematological: Negative  Psychiatric/Behavioral: Negative  Objective:      /72 (BP Location: Left arm, Patient Position: Sitting, Cuff Size: Adult)   Pulse 72   Temp 98 5 °F (36 9 °C) (Oral)   Ht 5' 1 5" (1 562 m)   Wt 80 9 kg (178 lb 6 4 oz)   SpO2 96%   BMI 33 16 kg/m²          Physical Exam   Constitutional: She is oriented to person, place, and time  She appears well-developed and well-nourished  No distress  obese   HENT:   Head: Normocephalic and atraumatic  Right Ear: External ear normal    Left Ear: External ear normal    Nose: Nose normal    Mouth/Throat: Oropharynx is clear and moist  No oropharyngeal exudate  Eyes: Conjunctivae and EOM are normal  Right eye exhibits no discharge  Left eye exhibits no discharge  No scleral icterus  Neck: Normal range of motion  Neck supple  No JVD present  No tracheal deviation present  No thyromegaly present  Cardiovascular: Normal rate and regular rhythm  Murmur (soft murmur lsb) heard  Pulmonary/Chest: Effort normal and breath sounds normal  No respiratory distress  She has no wheezes  She has no rales  She exhibits no tenderness  Abdominal: She exhibits no distension  There is no tenderness  There is no rebound  Musculoskeletal: She exhibits no edema or tenderness  Neurological: She is alert and oriented to person, place, and time  She has normal reflexes  No cranial nerve deficit  Coordination normal    Full neuro exam is normal including cerebellar exam, strength  Good  git normal   Skin: Skin is warm and dry  No rash noted  She is not diaphoretic  No erythema  No pallor  Psychiatric: She has a normal mood and affect   Her behavior is normal  Judgment and thought content normal          Scribe Signature and Attestation:  By signing my name below, Seda Resendez attest that this documentation has been prepared under the direction and in the presence of Dr Jenaro Hernandez MD  Electronically signed: Karen Richey  5/9/18    Provider Attestation:  I, Dr Jenaro Hernandez, personally performed the services described in this documentation  All medical record entries made by the kingaibe were at my direction and in my presence  I have reviewed the chart and discharge instructions (if applicable) and agree that the record reflects my personal performance and is accurate and complete  Dr Jenaro Hernandez MD  5/9/18

## 2018-05-23 ENCOUNTER — TELEPHONE (OUTPATIENT)
Dept: INTERNAL MEDICINE CLINIC | Age: 68
End: 2018-05-23

## 2018-05-23 DIAGNOSIS — Z87.898 HISTORY OF PREDIABETES: ICD-10-CM

## 2018-05-23 DIAGNOSIS — R53.83 OTHER FATIGUE: ICD-10-CM

## 2018-05-23 DIAGNOSIS — R35.89 POLYURIA: Primary | ICD-10-CM

## 2018-05-23 DIAGNOSIS — R79.9 ABNORMAL FINDING OF BLOOD CHEMISTRY: ICD-10-CM

## 2018-05-23 NOTE — TELEPHONE ENCOUNTER
Michelle from [employer] called to say that the TSH and hemoglobin A1c were not covered with diagnoses given  I spoke to Dr Fozia Salamanca and he added fatigue (R53 83) and polyuria (R35 8)  Rafael Conway stated that cleared the TSH; however, not the A1c  Dr Fozia Salamanca then added excessive thirst (R63 1) but that did not push it through  Dr Fozia Salamanca said to not do the A1c at this time   Rafael Conway stated that the patient said she would be willing to sign the ABN and accept the responsibility for that test

## 2018-05-24 DIAGNOSIS — R70.0 ELEVATED SED RATE: Primary | ICD-10-CM

## 2018-05-24 DIAGNOSIS — D72.829 LEUKOCYTOSIS, UNSPECIFIED TYPE: ICD-10-CM

## 2018-05-24 LAB
ALBUMIN SERPL-MCNC: 4.1 G/DL (ref 3.6–5.1)
ALBUMIN/GLOB SERPL: 1.5 (CALC) (ref 1–2.5)
ALP SERPL-CCNC: 89 U/L (ref 33–130)
ALT SERPL-CCNC: 12 U/L (ref 6–29)
AST SERPL-CCNC: 9 U/L (ref 10–35)
BASOPHILS # BLD AUTO: 95 CELLS/UL (ref 0–200)
BASOPHILS NFR BLD AUTO: 0.8 %
BILIRUB SERPL-MCNC: 0.3 MG/DL (ref 0.2–1.2)
BUN SERPL-MCNC: 20 MG/DL (ref 7–25)
BUN/CREAT SERPL: ABNORMAL (CALC) (ref 6–22)
CALCIUM SERPL-MCNC: 9.4 MG/DL (ref 8.6–10.4)
CHLORIDE SERPL-SCNC: 104 MMOL/L (ref 98–110)
CO2 SERPL-SCNC: 27 MMOL/L (ref 20–31)
CREAT SERPL-MCNC: 0.81 MG/DL (ref 0.5–0.99)
EOSINOPHIL # BLD AUTO: 583 CELLS/UL (ref 15–500)
EOSINOPHIL NFR BLD AUTO: 4.9 %
ERYTHROCYTE [DISTWIDTH] IN BLOOD BY AUTOMATED COUNT: 13.4 % (ref 11–15)
ERYTHROCYTE [SEDIMENTATION RATE] IN BLOOD BY WESTERGREN METHOD: 34 MM/H
EST. AVERAGE GLUCOSE BLD GHB EST-MCNC: 114 (CALC)
EST. AVERAGE GLUCOSE BLD GHB EST-SCNC: 6.3 (CALC)
GLOBULIN SER CALC-MCNC: 2.7 G/DL (CALC) (ref 1.9–3.7)
GLUCOSE SERPL-MCNC: 85 MG/DL (ref 65–99)
HBA1C MFR BLD: 5.6 % OF TOTAL HGB
HCT VFR BLD AUTO: 40.2 % (ref 35–45)
HGB BLD-MCNC: 12.7 G/DL (ref 11.7–15.5)
LYMPHOCYTES # BLD AUTO: 3463 CELLS/UL (ref 850–3900)
LYMPHOCYTES NFR BLD AUTO: 29.1 %
MCH RBC QN AUTO: 26.7 PG (ref 27–33)
MCHC RBC AUTO-ENTMCNC: 31.6 G/DL (ref 32–36)
MCV RBC AUTO: 84.5 FL (ref 80–100)
MONOCYTES # BLD AUTO: 785 CELLS/UL (ref 200–950)
MONOCYTES NFR BLD AUTO: 6.6 %
NEUTROPHILS # BLD AUTO: 6973 CELLS/UL (ref 1500–7800)
NEUTROPHILS NFR BLD AUTO: 58.6 %
PLATELET # BLD AUTO: 343 THOUSAND/UL (ref 140–400)
PMV BLD REES-ECKER: 11.6 FL (ref 7.5–12.5)
POTASSIUM SERPL-SCNC: 4.7 MMOL/L (ref 3.5–5.3)
PROT SERPL-MCNC: 6.8 G/DL (ref 6.1–8.1)
RBC # BLD AUTO: 4.76 MILLION/UL (ref 3.8–5.1)
SL AMB EGFR AFRICAN AMERICAN: 86 ML/MIN/1.73M2
SL AMB EGFR NON AFRICAN AMERICAN: 75 ML/MIN/1.73M2
SODIUM SERPL-SCNC: 139 MMOL/L (ref 135–146)
TSH SERPL-ACNC: 3.81 MIU/L (ref 0.4–4.5)
WBC # BLD AUTO: 11.9 THOUSAND/UL (ref 3.8–10.8)

## 2018-06-14 ENCOUNTER — TELEPHONE (OUTPATIENT)
Dept: INTERNAL MEDICINE CLINIC | Age: 68
End: 2018-06-14

## 2018-06-14 DIAGNOSIS — D72.829 LEUKOCYTOSIS, UNSPECIFIED TYPE: Primary | ICD-10-CM

## 2018-06-15 LAB
ANA SER QL IF: NEGATIVE
B BURGDOR AB SER IA-ACNC: <0.9 INDEX
BASOPHILS # BLD AUTO: 58 CELLS/UL (ref 0–200)
BASOPHILS NFR BLD AUTO: 0.3 %
CRP SERPL-MCNC: 12.4 MG/L
EOSINOPHIL # BLD AUTO: 504 CELLS/UL (ref 15–500)
EOSINOPHIL NFR BLD AUTO: 2.6 %
ERYTHROCYTE [DISTWIDTH] IN BLOOD BY AUTOMATED COUNT: 13.4 % (ref 11–15)
ERYTHROCYTE [SEDIMENTATION RATE] IN BLOOD BY WESTERGREN METHOD: 19 MM/H
HCT VFR BLD AUTO: 39.7 % (ref 35–45)
HGB BLD-MCNC: 13 G/DL (ref 11.7–15.5)
LYMPHOCYTES # BLD AUTO: 5859 CELLS/UL (ref 850–3900)
LYMPHOCYTES NFR BLD AUTO: 30.2 %
MCH RBC QN AUTO: 27.6 PG (ref 27–33)
MCHC RBC AUTO-ENTMCNC: 32.7 G/DL (ref 32–36)
MCV RBC AUTO: 84.3 FL (ref 80–100)
MONOCYTES # BLD AUTO: 1048 CELLS/UL (ref 200–950)
MONOCYTES NFR BLD AUTO: 5.4 %
NEUTROPHILS # BLD AUTO: ABNORMAL CELLS/UL (ref 1500–7800)
NEUTROPHILS NFR BLD AUTO: 61.5 %
PLATELET # BLD AUTO: 394 THOUSAND/UL (ref 140–400)
PMV BLD REES-ECKER: 11.5 FL (ref 7.5–12.5)
RBC # BLD AUTO: 4.71 MILLION/UL (ref 3.8–5.1)
WBC # BLD AUTO: 19.4 THOUSAND/UL (ref 3.8–10.8)

## 2018-06-25 DIAGNOSIS — D72.829 LEUKOCYTOSIS, UNSPECIFIED TYPE: Primary | ICD-10-CM

## 2018-06-28 ENCOUNTER — OFFICE VISIT (OUTPATIENT)
Dept: INTERNAL MEDICINE CLINIC | Facility: CLINIC | Age: 68
End: 2018-06-28
Payer: MEDICARE

## 2018-06-28 VITALS
SYSTOLIC BLOOD PRESSURE: 130 MMHG | OXYGEN SATURATION: 98 % | HEART RATE: 70 BPM | HEIGHT: 62 IN | WEIGHT: 178 LBS | TEMPERATURE: 98.6 F | BODY MASS INDEX: 32.76 KG/M2 | DIASTOLIC BLOOD PRESSURE: 70 MMHG

## 2018-06-28 DIAGNOSIS — M19.90 ARTHRITIS: Primary | ICD-10-CM

## 2018-06-28 DIAGNOSIS — R51.9 NONINTRACTABLE EPISODIC HEADACHE, UNSPECIFIED HEADACHE TYPE: ICD-10-CM

## 2018-06-28 DIAGNOSIS — F40.243 FEAR OF FLYING: ICD-10-CM

## 2018-06-28 DIAGNOSIS — R42 VERTIGO: ICD-10-CM

## 2018-06-28 DIAGNOSIS — F41.9 ANXIETY: ICD-10-CM

## 2018-06-28 DIAGNOSIS — R55 NEAR SYNCOPE: ICD-10-CM

## 2018-06-28 LAB
BASOPHILS # BLD AUTO: 47 CELLS/UL (ref 0–200)
BASOPHILS NFR BLD AUTO: 0.5 %
EOSINOPHIL # BLD AUTO: 301 CELLS/UL (ref 15–500)
EOSINOPHIL NFR BLD AUTO: 3.2 %
ERYTHROCYTE [DISTWIDTH] IN BLOOD BY AUTOMATED COUNT: 13.3 % (ref 11–15)
HCT VFR BLD AUTO: 38.8 % (ref 35–45)
HGB BLD-MCNC: 12.7 G/DL (ref 11.7–15.5)
LYMPHOCYTES # BLD AUTO: 3544 CELLS/UL (ref 850–3900)
LYMPHOCYTES NFR BLD AUTO: 37.7 %
MCH RBC QN AUTO: 27.3 PG (ref 27–33)
MCHC RBC AUTO-ENTMCNC: 32.7 G/DL (ref 32–36)
MCV RBC AUTO: 83.3 FL (ref 80–100)
MONOCYTES # BLD AUTO: 583 CELLS/UL (ref 200–950)
MONOCYTES NFR BLD AUTO: 6.2 %
NEUTROPHILS # BLD AUTO: 4926 CELLS/UL (ref 1500–7800)
NEUTROPHILS NFR BLD AUTO: 52.4 %
PLATELET # BLD AUTO: 292 THOUSAND/UL (ref 140–400)
PMV BLD REES-ECKER: 11 FL (ref 7.5–12.5)
RBC # BLD AUTO: 4.66 MILLION/UL (ref 3.8–5.1)
WBC # BLD AUTO: 9.4 THOUSAND/UL (ref 3.8–10.8)

## 2018-06-28 PROCEDURE — 99214 OFFICE O/P EST MOD 30 MIN: CPT | Performed by: INTERNAL MEDICINE

## 2018-06-28 RX ORDER — LORAZEPAM 0.5 MG/1
0.5 TABLET ORAL DAILY PRN
Qty: 30 TABLET | Refills: 0 | Status: SHIPPED | OUTPATIENT
Start: 2018-06-28 | End: 2019-05-23 | Stop reason: SDUPTHER

## 2018-06-28 NOTE — PROGRESS NOTES
MEDICAL STUDENT  Inpatient Progress Note for TRAINING ONLY  Not Part of Legal Medical Record       Progress Note - Héctor Chan 70 y o  female MRN: [id number]    Unit/Bed#:  Encounter: [id number]      Assessment:  ***    Plan:  ***    Subjective:   ***    Objective:     Vitals: Blood pressure 130/70, pulse 70, temperature 98 6 °F (37 °C), temperature source Oral, height 5' 1 5" (1 562 m), weight 80 7 kg (178 lb), SpO2 98 %  ,Body mass index is 33 09 kg/m²      [unfilled]    Physical Exam: {Exam, Complete:76285}     Invasive Devices          No matching active lines, drains, or airways          Lab, Imaging and other studies: {Results Review Statement:10485}  VTE Pharmacologic Prophylaxis: {Pharmacologic VTE Prophylaxis:131025636}  VTE Mechanical Prophylaxis: {Mechanical VTE Prophylaxis:36654}

## 2018-06-28 NOTE — PATIENT INSTRUCTIONS
1  She will have an MRI of the head to assess these recurrent episodes  2  She will follow up with us in 4 weeks or as needed

## 2018-07-06 ENCOUNTER — HOSPITAL ENCOUNTER (OUTPATIENT)
Dept: RADIOLOGY | Facility: HOSPITAL | Age: 68
Discharge: HOME | End: 2018-07-06
Payer: MEDICARE

## 2018-07-06 DIAGNOSIS — R42 VERTIGO: ICD-10-CM

## 2018-07-06 DIAGNOSIS — R51.9 NONINTRACTABLE EPISODIC HEADACHE, UNSPECIFIED HEADACHE TYPE: ICD-10-CM

## 2018-07-06 DIAGNOSIS — R55 NEAR SYNCOPE: ICD-10-CM

## 2018-07-06 PROCEDURE — 70551 MRI BRAIN STEM W/O DYE: CPT

## 2018-07-19 LAB — CCP IGG SERPL-ACNC: <16 UNITS

## 2018-07-25 ENCOUNTER — OFFICE VISIT (OUTPATIENT)
Dept: INTERNAL MEDICINE CLINIC | Age: 68
End: 2018-07-25
Payer: MEDICARE

## 2018-07-25 VITALS
HEART RATE: 71 BPM | SYSTOLIC BLOOD PRESSURE: 132 MMHG | WEIGHT: 180 LBS | DIASTOLIC BLOOD PRESSURE: 78 MMHG | TEMPERATURE: 97.9 F | HEIGHT: 62 IN | OXYGEN SATURATION: 98 % | BODY MASS INDEX: 33.13 KG/M2

## 2018-07-25 DIAGNOSIS — R42 LIGHT HEADEDNESS: Primary | ICD-10-CM

## 2018-07-25 DIAGNOSIS — M25.561 CHRONIC PAIN OF RIGHT KNEE: ICD-10-CM

## 2018-07-25 DIAGNOSIS — G47.9 SLEEP DISTURBANCE: ICD-10-CM

## 2018-07-25 DIAGNOSIS — D72.820 LYMPHOCYTOSIS: ICD-10-CM

## 2018-07-25 DIAGNOSIS — G89.29 CHRONIC PAIN OF RIGHT KNEE: ICD-10-CM

## 2018-07-25 PROCEDURE — 99214 OFFICE O/P EST MOD 30 MIN: CPT | Performed by: INTERNAL MEDICINE

## 2018-07-25 NOTE — PATIENT INSTRUCTIONS
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

## 2018-07-25 NOTE — PROGRESS NOTES
Assessment/Plan:    1  Light-headedness  She had an MRI performed due to her complaints of light-headedness  The MRI on 7/6/18 showed No acute disease  No acute ischemia or intracranial mass  Minor degree of what most probably represents chronic small vessel disease   She is wearing an event monitor and following this light-headedness with a cardiologist as well  She was advised to use the karina [employer] to keep exercising her mental faculties as she has age related chronic small vessel disease  2  History of elevated WBCs  Her WBCs were highly elevated at 19 4, with her lymphocytes elevated at 5859, and monocytes elevated at 1048 on 6/13/18  Her CBC on 6/27/18 returned to the normal range at 9 4  Her CCP was normal at less than 16  She will have another CCP, CBC and  Sed rate drawn prior to her next appointment  3  Sleep disturbance  She reports she is fatigued due to lack of sleep  She is not sure of why she has this lack of sleep, whether its anxiety or whether its her multiple episodes of nocturia  She was also advised to get out of bed if she can not sleep for 15-30 minutes  She should stay out of the bed until she feels drowsy and then she should return to bed  She should also reset her Circadian Rhythm by sleeping at the same time each night and waking up at the same time every day  She should also eliminate any alcohol at night and she should eliminate caffeine as well  4  Chronic Right Knee pain  She reports she has chronic right knee pain and needs knee replacement surgery  She has been seeing her orthopedist who has been giving her cortisone shots every 3 months  She will start getting Hyalgan shots for her knee starting this Friday  5  Event monitor  Today, she is wearing an event monitor  She met with Dr Becca Macedo last week who gave her this event monitor and she will continue wearing it for 3 weeks    She reports she had an episode of unbalance as she was about to get in her car this past week and she did record it on her event monitor by pressing the button  We reviewed her Echo with her today from 7/6/18  6  Healthcare Maintenance  She will follow up with us in 9 weeks    Greater than 50% of the 30 minute evaluation was a face-to-face discussion regarding the pathophysiology of her current symptoms and further plan, as well as counseling, educating, and coordinating the patient's care  Diagnoses and all orders for this visit:    Light headedness    Lymphocytosis  -     CBC and differential; Future  -     Sedimentation rate, automated; Future  -     Cyclic citrul peptide antibody, IgG; Future    Sleep disturbance    Chronic pain of right knee          Subjective:      Patient ID: Ruperto Marie is a 70 y o  female  Samanta Camarillo is a 70year old female who presents today for a 4 week follow up regarding her lightheadedness, dizziness, and elevated WBCs  She reports she is fatigued due to lack of sleep  She is not sure of why she has lack of sleep, she either   She states she had only 25 hours of sleep in the whole week 2 weeks ago  She reports that last night she started to sleep at 9 pm and woke up at 1:30 am    She reports she has chronic knee pain and needs knee replacement surgery  She has been seeing her orthopedist who has been giving her cortisone shots every 3 months  She will start getting Hyalgan shots for her knee starting this Friday  Today, she is wearing an event monitor  She met with Dr Santana Riley last week who gave her this event monitor and she will continue wearing it for 3 weeks  She reports she had an episode of unbalance as she was about to get in her car this past week and she did record it on her event monitor by pressing the button  Otherwise, she has no other complaints             The following portions of the patient's history were reviewed and updated as appropriate: allergies, current medications, past family history, past medical history, past social history, past surgical history and problem list     Review of Systems   Constitutional: Negative  HENT: Negative  Eyes: Negative  Respiratory: Positive for cough (Coughs in the morning, she doesnt know why  Says she get a lot of phlegm  )  Negative for chest tightness, shortness of breath and wheezing  Cardiovascular: Negative  Negative for chest pain, palpitations and leg swelling  Gastrointestinal: Negative  Endocrine: Negative  Genitourinary: Positive for urgency (Urinary incontinence  )  Negative for difficulty urinating  Musculoskeletal: Positive for arthralgias (Has arthritis  Mainly in legs and back  ), gait problem (Because of her knee pain for the last year  ) and neck stiffness (Sees a chiropractor for stiffness  )  Skin: Negative  Allergic/Immunologic:        Allergic to sulfa drugs  Neurological: Positive for dizziness (Has verigo for last two months  Thats why shes wearing a heart monitor  ) and light-headedness  Hematological: Negative  Psychiatric/Behavioral: Positive for sleep disturbance Venus Bill she never sleeps well  )  Objective:      /78 (BP Location: Left arm, Patient Position: Sitting, Cuff Size: Adult)   Pulse 71   Temp 97 9 °F (36 6 °C) (Tympanic)   Ht 5' 1 5" (1 562 m)   Wt 81 6 kg (180 lb)   SpO2 98%   BMI 33 46 kg/m²          Physical Exam   Constitutional: She is oriented to person, place, and time  She appears well-developed and well-nourished  No distress  HENT:   Head: Normocephalic and atraumatic  Mouth/Throat: Oropharynx is clear and moist  No oropharyngeal exudate  Eyes: Conjunctivae and EOM are normal  Left eye exhibits no discharge  No scleral icterus  Neck: Normal range of motion  Neck supple  Cardiovascular: Normal rate and regular rhythm  Exam reveals no friction rub  No murmur heard  Pulmonary/Chest: No respiratory distress  She has no wheezes  She has no rales  Abdominal: Soft   Bowel sounds are normal  She exhibits no distension  There is no tenderness  There is no rebound and no guarding  Musculoskeletal: She exhibits tenderness  She exhibits no edema  Neurological: She is alert and oriented to person, place, and time  She displays normal reflexes  No cranial nerve deficit  No cerebellar dysfuntion, no focality   Skin: Skin is warm and dry  No rash noted  No erythema  No pallor  Psychiatric: She has a normal mood and affect  Her behavior is normal  Judgment and thought content normal          Scribe Signature and Attestation:  By signing my name below, Sindi Rendon attest that this documentation has been prepared under the direction and in the presence of Dr Alycia Craig MD  Electronically signed: Karen Moon  7/25/18    Provider Attestation:  I, Dr Alycia Craig, personally performed the services described in this documentation  All medical record entries made by the kingaibacacia were at my direction and in my presence  I have reviewed the chart and discharge instructions (if applicable) and agree that the record reflects my personal performance and is accurate and complete  Dr Alycia Craig MD  7/25/18

## 2018-09-19 LAB
CCP IGG SERPL-ACNC: <16 UNITS
ERYTHROCYTE [SEDIMENTATION RATE] IN BLOOD BY WESTERGREN METHOD: 33 MM/H
LEFT EYE DIABETIC RETINOPATHY: NORMAL
RIGHT EYE DIABETIC RETINOPATHY: NORMAL

## 2018-09-20 ENCOUNTER — OFFICE VISIT (OUTPATIENT)
Dept: INTERNAL MEDICINE CLINIC | Age: 68
End: 2018-09-20
Payer: MEDICARE

## 2018-09-20 VITALS
OXYGEN SATURATION: 98 % | HEART RATE: 72 BPM | SYSTOLIC BLOOD PRESSURE: 134 MMHG | BODY MASS INDEX: 33.99 KG/M2 | TEMPERATURE: 97.3 F | DIASTOLIC BLOOD PRESSURE: 74 MMHG | WEIGHT: 180 LBS | HEIGHT: 61 IN

## 2018-09-20 DIAGNOSIS — Z23 NEED FOR INFLUENZA VACCINATION: ICD-10-CM

## 2018-09-20 DIAGNOSIS — G47.9 SLEEP DISTURBANCE: ICD-10-CM

## 2018-09-20 DIAGNOSIS — R35.0 FREQUENCY OF URINATION: ICD-10-CM

## 2018-09-20 DIAGNOSIS — M19.90 ARTHRITIS: Primary | ICD-10-CM

## 2018-09-20 PROCEDURE — 99213 OFFICE O/P EST LOW 20 MIN: CPT | Performed by: INTERNAL MEDICINE

## 2018-09-20 PROCEDURE — G0008 ADMIN INFLUENZA VIRUS VAC: HCPCS

## 2018-09-20 PROCEDURE — 90662 IIV NO PRSV INCREASED AG IM: CPT

## 2018-09-20 NOTE — PROGRESS NOTES
Assessment/Plan:    1  Arthritis  She complains of arthritic pain in her right knee for which she follows with Dr Onel Hedrick, orthopedist, and gets her knee injections from him  She will have a CRP drawn prior to her next appointment for further evaluation  2  Frequency and urgency in urination  She reports no improvement in her frequency and urgency in urination  We advised her to see urogynecologist, which she refuses to do at this time  3  Sleep disturbance  She reports inadequate sleep due to her urinary problem  We advised her to use OTC Melatonin 4-8 mg as needed for her sleeping  4  Healthcare Maintenance  She will follow up with us in 2 months or as needed  Diagnoses and all orders for this visit:    Arthritis  -     C-reactive protein; Future    Frequency of urination    Sleep disturbance          Subjective:      Patient ID: Dimitrios Hunter is a 70 y o  female  Kacey Rausch is a 70year old female who presents today for an 8 week follow up regarding her frequency and urgency in urination  She reports no significant complaints today and is doing well overall  She reports no improvement in her urinary problems and states that she has had inadequate sleep due to her urinary problems  She complains of knee arthritis which she follows with Dr Onel Hedrick, orthopedist, and gets her knee injections from him  She follows with her eye doctor regularly and had an eye exam recently  The following portions of the patient's history were reviewed and updated as appropriate: allergies, current medications, past family history, past medical history, past social history, past surgical history and problem list     Review of Systems   Constitutional: Negative for diaphoresis, fatigue, fever and unexpected weight change  HENT: Negative for congestion, facial swelling, nosebleeds, postnasal drip and rhinorrhea  Eyes: Positive for visual disturbance (B/L caracts)     Respiratory: Negative for cough, choking, chest tightness, shortness of breath, wheezing and stridor  Cardiovascular: Negative  Gastrointestinal: Negative  Genitourinary: Positive for difficulty urinating, frequency and urgency  Negative for flank pain  Musculoskeletal: Positive for back pain  Psychiatric/Behavioral: Positive for sleep disturbance  Negative for agitation, behavioral problems, confusion, decreased concentration, dysphoric mood, hallucinations, self-injury and suicidal ideas  The patient is not nervous/anxious and is not hyperactive  Objective:      /74 (BP Location: Left arm, Patient Position: Sitting, Cuff Size: Adult)   Pulse 72   Temp (!) 97 3 °F (36 3 °C) (Tympanic)   Ht 5' 1 42" (1 56 m)   Wt 81 6 kg (180 lb)   SpO2 98% Comment: room air  BMI 33 55 kg/m²          Physical Exam   Constitutional: She is oriented to person, place, and time  She appears well-developed and well-nourished  obesity   HENT:   Head: Normocephalic and atraumatic  Eyes: Conjunctivae and EOM are normal    Neck: Normal range of motion  Neck supple  Cardiovascular: Normal rate, regular rhythm and normal heart sounds  Exam reveals no gallop and no friction rub  No murmur heard  Pulmonary/Chest: Effort normal and breath sounds normal  No respiratory distress  She has no wheezes  She has no rales  Abdominal: Soft  Bowel sounds are normal  She exhibits no distension  There is no tenderness  There is no rebound and no guarding  Musculoskeletal: She exhibits edema  Neurological: She is oriented to person, place, and time  She has normal reflexes  Skin: Skin is warm and dry  No rash noted  No erythema  No pallor  Psychiatric: She has a normal mood and affect   Her behavior is normal  Judgment and thought content normal          Scribe Signature and Attestation:  By signing my name below, Jennifer TANG attest that this documentation has been prepared under the direction and in the presence of Dr Kota Parikh, MD  Electronically signed: Karen Art  9/20/18    Provider Attestation:  I, Dr Dannielle Trevizo, personally performed the services described in this documentation  All medical record entries made by the kingaibacacia were at my direction and in my presence  I have reviewed the chart and discharge instructions (if applicable) and agree that the record reflects my personal performance and is accurate and complete  Dr Dannielle Trevizo MD  9/20/18

## 2018-09-20 NOTE — PATIENT INSTRUCTIONS
1  She will have a CRP drawn prior to her next appointment for further evaluation  2  She was advised to use OTC Melatonin 4-8 mg as needed for her sleeping  3  She will follow up with us in 2 months or as needed

## 2018-10-30 LAB — CRP SERPL-MCNC: 7.6 MG/L

## 2018-11-01 ENCOUNTER — OFFICE VISIT (OUTPATIENT)
Dept: INTERNAL MEDICINE CLINIC | Age: 68
End: 2018-11-01
Payer: MEDICARE

## 2018-11-01 VITALS
BODY MASS INDEX: 33.57 KG/M2 | TEMPERATURE: 97.9 F | HEIGHT: 62 IN | SYSTOLIC BLOOD PRESSURE: 128 MMHG | OXYGEN SATURATION: 97 % | HEART RATE: 69 BPM | DIASTOLIC BLOOD PRESSURE: 66 MMHG | WEIGHT: 182.4 LBS

## 2018-11-01 DIAGNOSIS — Z23 NEED FOR INFLUENZA VACCINATION: ICD-10-CM

## 2018-11-01 DIAGNOSIS — Z71.85 ENCOUNTER FOR COUNSELING REGARDING IMMUNIZATION: ICD-10-CM

## 2018-11-01 DIAGNOSIS — Z23 NEED FOR PNEUMOCOCCAL VACCINE: ICD-10-CM

## 2018-11-01 DIAGNOSIS — M17.10 ARTHRITIS OF KNEE: ICD-10-CM

## 2018-11-01 DIAGNOSIS — R39.15 URINARY URGENCY: ICD-10-CM

## 2018-11-01 DIAGNOSIS — R09.82 PND (POST-NASAL DRIP): ICD-10-CM

## 2018-11-01 DIAGNOSIS — R35.0 URINARY FREQUENCY: Primary | ICD-10-CM

## 2018-11-01 PROCEDURE — G0009 ADMIN PNEUMOCOCCAL VACCINE: HCPCS

## 2018-11-01 PROCEDURE — 99214 OFFICE O/P EST MOD 30 MIN: CPT | Performed by: INTERNAL MEDICINE

## 2018-11-01 PROCEDURE — 90670 PCV13 VACCINE IM: CPT

## 2018-11-01 NOTE — PATIENT INSTRUCTIONS
1  She was advised to go to [facility] or [facility] for an evaluation regarding this urgency and frequency  She states she will consider this and let us know  2  She was advised to use nasal spray daily for her cough and Post nasal drip in the AM     3  She will have a Prevnar 13 administered today at the office  4  She was advised about the benefits of the new Shingrix vaccine and was given a prescription  She will have it done at her earliest convenience  5  She is due for her TDAP vaccine  6  She will follow up with us in 8 weeks or as needed

## 2018-11-01 NOTE — PROGRESS NOTES
Assessment/Plan:    1  Right knee arthritis  She reports she uses ice therapy more frequently than the voltaren gel for her knee pain  She finished her gel injections with her orthopedist who advised her she can have the gel injections again in a few months  He also advised her that she should have knee replacement surgery if her condition worsens extremely  2  Urinary frequency  She reports she continues to have frequency and urgency in urination  She used to follow with Dr Adama Bee and Dr Beto Hardy for her urgency and frequency in urination, but has not done so in a long time  She does not feel like she has any reason to see an urologist and will continue with her current lifestyle as she does not like the alternatives and feels they are high risk for her  We gave her the option to see Dr Alley Mcmullen, but she declined to do so currently  She was also advised to go to [facility] or [facility] for an evaluation regarding this urgency and frequency  She states she will consider this and let us know  3  PND  She reports she has PND and a cough usually in the morning  She was advised to use Flonase spray for this daily  4  Healthcare Maintenance  She will have a Prevnar 13 administered today at the office  She was advised about the benefits of the new Shingrix vaccine and was given a prescription  She will have it done at her earliest convenience  She is due for her TDAP vaccine  She had her influenza vaccine administered on 9/20/18  She will follow up with us in 8 weeks or as needed  Diagnoses and all orders for this visit:    Urinary frequency    Urinary urgency    Arthritis of knee    PND (post-nasal drip)    Encounter for counseling regarding immunization  -     Zoster Vaccine Recombinant IM          Subjective:      Patient ID: Anabela Palafoxyers is a 70 y o  female  Juljackeline Katie is a 70year old female who presents today for a 6 week follow up regarding her arthritis and urinary frequency   She reports she uses ice therapy more frequently than the voltaren gel for her knee pain  She finished her gel injections with her orthopedist who advised her she can have the gel injections again in a few months  He also advised her that she should have knee replacement surgery if her condition worsens extremely  She reports she continues to have frequency and urgency in urination  She used to follow with Dr Jose Angel Brady and Dr Eber Bailey for her urgency and frequency in urination, but has not done so in a long time  She does not feel like she has any reason to see an urologist and will continue with her current lifestyle as she does not like the alternatives as they are high risk for her  She states she has PND and cough usually in the morning  She reports she has facial lesions for which she will see Dr Claudia Adler  The following portions of the patient's history were reviewed and updated as appropriate: allergies, current medications, past family history, past medical history, past social history, past surgical history and problem list     Review of Systems   Constitutional: Negative for diaphoresis, fatigue, fever and unexpected weight change  HENT: Negative for congestion, hearing loss, mouth sores, postnasal drip, rhinorrhea, sinus pain, sinus pressure and sneezing  Eyes: Negative  Respiratory: Positive for cough (AM cough)  Negative for chest tightness, shortness of breath, wheezing and stridor  Cardiovascular: Negative for chest pain, palpitations and leg swelling  Genitourinary: Positive for urgency  Negative for decreased urine volume and hematuria  Incontinence   Musculoskeletal: Positive for arthralgias (knees rt >left) and back pain  Negative for gait problem, joint swelling, myalgias, neck pain and neck stiffness  Skin: Negative  Neurological: Negative for dizziness, seizures, syncope, speech difficulty, light-headedness, numbness and headaches     Hematological: Negative for adenopathy  Psychiatric/Behavioral: Negative  Objective:      /66 (BP Location: Left arm, Patient Position: Sitting, Cuff Size: Standard)   Pulse 69   Temp 97 9 °F (36 6 °C) (Tympanic)   Ht 5' 1 61" (1 565 m)   Wt 82 7 kg (182 lb 6 4 oz)   SpO2 97%   BMI 33 78 kg/m²          Physical Exam   Constitutional: She is oriented to person, place, and time  She appears well-developed and well-nourished  No distress  obese   HENT:   Head: Normocephalic and atraumatic  Eyes: Conjunctivae and EOM are normal  Right eye exhibits no discharge  Left eye exhibits no discharge  No scleral icterus  Neck: Normal range of motion  Neck supple  No JVD present  No thyromegaly present  Cardiovascular: Normal rate and regular rhythm  No murmur heard  Pulmonary/Chest: Effort normal and breath sounds normal  No respiratory distress  She has no wheezes  She has no rales  She exhibits no tenderness  Abdominal: Soft  Bowel sounds are normal  She exhibits no distension  There is no tenderness  There is no rebound  Musculoskeletal: She exhibits no edema, tenderness or deformity  Lymphadenopathy:     She has no cervical adenopathy  Neurological: She is oriented to person, place, and time  She has normal reflexes  No cranial nerve deficit  Skin: Skin is warm and dry  No rash noted  She is not diaphoretic  No erythema  No pallor  Facial lesions to see derm  Psychiatric: She has a normal mood and affect  Judgment and thought content normal          Scribe Signature and Attestation:  By signing my name below, Val Childs attest that this documentation has been prepared under the direction and in the presence of Dr Lore Perry MD  Electronically signed: Karen Ness  11/1/18    Provider Attestation:  I, Dr Lore Perry, personally performed the services described in this documentation  All medical record entries made by the karen were at my direction and in my presence   I have reviewed the chart and discharge instructions (if applicable) and agree that the record reflects my personal performance and is accurate and complete  Dr Nicky Gracia MD  11/1/18

## 2019-01-11 DIAGNOSIS — Z12.31 ENCOUNTER FOR SCREENING MAMMOGRAM FOR MALIGNANT NEOPLASM OF BREAST: ICD-10-CM

## 2019-02-14 ENCOUNTER — OFFICE VISIT (OUTPATIENT)
Dept: INTERNAL MEDICINE CLINIC | Age: 69
End: 2019-02-14
Payer: MEDICARE

## 2019-02-14 VITALS
DIASTOLIC BLOOD PRESSURE: 80 MMHG | OXYGEN SATURATION: 98 % | BODY MASS INDEX: 33.56 KG/M2 | SYSTOLIC BLOOD PRESSURE: 142 MMHG | HEART RATE: 67 BPM | TEMPERATURE: 97.6 F | WEIGHT: 181.2 LBS

## 2019-02-14 DIAGNOSIS — J31.0 OTHER RHINITIS: ICD-10-CM

## 2019-02-14 DIAGNOSIS — R53.83 FATIGUE, UNSPECIFIED TYPE: ICD-10-CM

## 2019-02-14 DIAGNOSIS — B49 FUNGAL INFECTION: ICD-10-CM

## 2019-02-14 DIAGNOSIS — K21.9 GASTROESOPHAGEAL REFLUX DISEASE WITHOUT ESOPHAGITIS: ICD-10-CM

## 2019-02-14 DIAGNOSIS — E78.5 HYPERLIPIDEMIA, UNSPECIFIED HYPERLIPIDEMIA TYPE: Primary | ICD-10-CM

## 2019-02-14 DIAGNOSIS — H61.23 BILATERAL HEARING LOSS DUE TO CERUMEN IMPACTION: ICD-10-CM

## 2019-02-14 PROBLEM — J30.9 ALLERGIC SINUSITIS: Status: ACTIVE | Noted: 2017-11-20

## 2019-02-14 PROBLEM — E03.9 HYPOTHYROIDISM, ADULT: Status: ACTIVE | Noted: 2017-10-31

## 2019-02-14 PROBLEM — M17.10 ARTHRITIS OF KNEE: Status: ACTIVE | Noted: 2017-10-31

## 2019-02-14 PROCEDURE — 69210 REMOVE IMPACTED EAR WAX UNI: CPT | Performed by: PHYSICIAN ASSISTANT

## 2019-02-14 PROCEDURE — 99214 OFFICE O/P EST MOD 30 MIN: CPT | Performed by: PHYSICIAN ASSISTANT

## 2019-02-14 RX ORDER — NYSTATIN 100000 [USP'U]/G
POWDER TOPICAL 3 TIMES DAILY
Qty: 56.7 G | Refills: 1 | Status: SHIPPED | OUTPATIENT
Start: 2019-02-14 | End: 2020-06-18 | Stop reason: SDUPTHER
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
## 2019-03-12 ENCOUNTER — TELEPHONE (OUTPATIENT)
Dept: OTHER | Facility: OTHER | Age: 69
End: 2019-03-12

## 2019-03-12 NOTE — TELEPHONE ENCOUNTER
Patient is leaving the country 03/14 and would like a call back to schedule an appointment or have a prescription sent in for a possible cold

## 2019-03-14 ENCOUNTER — OFFICE VISIT (OUTPATIENT)
Dept: INTERNAL MEDICINE CLINIC | Age: 69
End: 2019-03-14
Payer: MEDICARE

## 2019-03-14 VITALS
BODY MASS INDEX: 32.97 KG/M2 | TEMPERATURE: 98.2 F | WEIGHT: 179.2 LBS | HEIGHT: 62 IN | OXYGEN SATURATION: 98 % | DIASTOLIC BLOOD PRESSURE: 84 MMHG | HEART RATE: 78 BPM | SYSTOLIC BLOOD PRESSURE: 132 MMHG

## 2019-03-14 DIAGNOSIS — J06.9 VIRAL URI: Primary | ICD-10-CM

## 2019-03-14 PROCEDURE — 99213 OFFICE O/P EST LOW 20 MIN: CPT | Performed by: FAMILY MEDICINE

## 2019-03-14 RX ORDER — AMOXICILLIN 875 MG/1
875 TABLET, COATED ORAL 2 TIMES DAILY
Qty: 14 TABLET | Refills: 0 | Status: SHIPPED | OUTPATIENT
Start: 2019-03-14 | End: 2019-03-21

## 2019-03-14 NOTE — PROGRESS NOTES
Assessment/Plan:    No problem-specific Assessment & Plan notes found for this encounter  Problem List Items Addressed This Visit        Respiratory    Viral URI - Primary    Relevant Medications    amoxicillin (AMOXIL) 875 mg tablet            Hold off for 3 days and if symptoms not improved start antibiotics  Must complete antibiotics it started  [place] advised her to fill script prior to leaving  If symptoms are improved no need to start    Subjective:      Patient ID: Delvis Contreras is a 70 y o  female  Patient is here today for cold symptoms  Patient states she was here last month for an ear lavage  She states she used the debrox drops yesterday but she still feels like her ear is clogged  Pt denies hearing loss or drainage from the ear  Patient states she had a lot of mucus in her nose last time she was here a month ago and was given a nasal spray  Patient states she is starting to cough now  URI    This is a new problem  The current episode started in the past 7 days (symptoms started saturday)  The problem has been gradually worsening  There has been no fever  Associated symptoms include chest pain, congestion, coughing, headaches, a plugged ear sensation, rhinorrhea and sneezing  Pertinent negatives include no diarrhea, nausea, sinus pain, sore throat or vomiting  Associated symptoms comments: Chest tightness "like there is a brick on it"  She has tried NSAIDs (nasal spray, aleve) for the symptoms  The treatment provided mild relief  No sick contacts   Patient is UTD with flu shot       The following portions of the patient's history were reviewed and updated as appropriate: allergies, current medications, past family history, past medical history, past social history, past surgical history and problem list     Review of Systems   HENT: Positive for congestion, rhinorrhea and sneezing  Negative for sinus pain and sore throat      Respiratory: Positive for cough  Cardiovascular: Positive for chest pain  Gastrointestinal: Negative for diarrhea, nausea and vomiting  Neurological: Positive for headaches  Objective:      /84 (BP Location: Left arm, Patient Position: Sitting, Cuff Size: Adult)   Pulse 78   Temp 98 2 °F (36 8 °C) (Tympanic)   Ht 5' 1 52" (1 563 m)   Wt 81 3 kg (179 lb 3 2 oz)   SpO2 98% Comment: ra  BMI 33 29 kg/m²          Physical Exam   Constitutional: She appears well-developed and well-nourished  HENT:   Head: Normocephalic and atraumatic  Right Ear: External ear normal    Left Ear: External ear normal    Cerumen build-up in left ear canal    Eyes: Pupils are equal, round, and reactive to light  Conjunctivae are normal  Right eye exhibits no discharge  Left eye exhibits no discharge  Neck: Normal range of motion  Neck supple  Cardiovascular: Normal rate, regular rhythm and normal heart sounds  No murmur heard  Pulmonary/Chest: Effort normal and breath sounds normal  No respiratory distress  She has no wheezes  Abdominal: Soft  Bowel sounds are normal    Lymphadenopathy:     She has no cervical adenopathy  Skin: Skin is warm and dry  No rash noted  No erythema

## 2019-04-02 LAB
ALBUMIN SERPL-MCNC: 4.2 G/DL (ref 3.6–5.1)
ALBUMIN/GLOB SERPL: 1.6 (CALC) (ref 1–2.5)
ALP SERPL-CCNC: 102 U/L (ref 33–130)
ALT SERPL-CCNC: 20 U/L (ref 6–29)
AST SERPL-CCNC: 10 U/L (ref 10–35)
BASOPHILS # BLD AUTO: 81 CELLS/UL (ref 0–200)
BASOPHILS NFR BLD AUTO: 0.9 %
BILIRUB SERPL-MCNC: 0.4 MG/DL (ref 0.2–1.2)
BUN SERPL-MCNC: 15 MG/DL (ref 7–25)
BUN/CREAT SERPL: NORMAL (CALC) (ref 6–22)
CALCIUM SERPL-MCNC: 9.5 MG/DL (ref 8.6–10.4)
CHLORIDE SERPL-SCNC: 104 MMOL/L (ref 98–110)
CHOLEST SERPL-MCNC: 247 MG/DL
CHOLEST/HDLC SERPL: 4.3 (CALC)
CO2 SERPL-SCNC: 25 MMOL/L (ref 20–32)
CREAT SERPL-MCNC: 0.71 MG/DL (ref 0.5–0.99)
EOSINOPHIL # BLD AUTO: 432 CELLS/UL (ref 15–500)
EOSINOPHIL NFR BLD AUTO: 4.8 %
ERYTHROCYTE [DISTWIDTH] IN BLOOD BY AUTOMATED COUNT: 13 % (ref 11–15)
GLOBULIN SER CALC-MCNC: 2.6 G/DL (CALC) (ref 1.9–3.7)
GLUCOSE SERPL-MCNC: 86 MG/DL (ref 65–99)
HCT VFR BLD AUTO: 40.6 % (ref 35–45)
HDLC SERPL-MCNC: 57 MG/DL
HGB BLD-MCNC: 13 G/DL (ref 11.7–15.5)
LDLC SERPL CALC-MCNC: 159 MG/DL (CALC)
LYMPHOCYTES # BLD AUTO: 3753 CELLS/UL (ref 850–3900)
LYMPHOCYTES NFR BLD AUTO: 41.7 %
MCH RBC QN AUTO: 26.6 PG (ref 27–33)
MCHC RBC AUTO-ENTMCNC: 32 G/DL (ref 32–36)
MCV RBC AUTO: 83 FL (ref 80–100)
MONOCYTES # BLD AUTO: 621 CELLS/UL (ref 200–950)
MONOCYTES NFR BLD AUTO: 6.9 %
NEUTROPHILS # BLD AUTO: 4113 CELLS/UL (ref 1500–7800)
NEUTROPHILS NFR BLD AUTO: 45.7 %
NONHDLC SERPL-MCNC: 190 MG/DL (CALC)
PLATELET # BLD AUTO: 389 THOUSAND/UL (ref 140–400)
PMV BLD REES-ECKER: 11.4 FL (ref 7.5–12.5)
POTASSIUM SERPL-SCNC: 4.4 MMOL/L (ref 3.5–5.3)
PROT SERPL-MCNC: 6.8 G/DL (ref 6.1–8.1)
RBC # BLD AUTO: 4.89 MILLION/UL (ref 3.8–5.1)
SL AMB EGFR AFRICAN AMERICAN: 101 ML/MIN/1.73M2
SL AMB EGFR NON AFRICAN AMERICAN: 87 ML/MIN/1.73M2
SODIUM SERPL-SCNC: 139 MMOL/L (ref 135–146)
TRIGL SERPL-MCNC: 163 MG/DL
TSH SERPL-ACNC: 3.35 MIU/L (ref 0.4–4.5)
WBC # BLD AUTO: 9 THOUSAND/UL (ref 3.8–10.8)

## 2019-04-03 ENCOUNTER — CONSULT (OUTPATIENT)
Dept: INTERNAL MEDICINE CLINIC | Age: 69
End: 2019-04-03
Payer: MEDICARE

## 2019-04-03 VITALS
SYSTOLIC BLOOD PRESSURE: 132 MMHG | TEMPERATURE: 98 F | BODY MASS INDEX: 33.85 KG/M2 | OXYGEN SATURATION: 98 % | WEIGHT: 182.2 LBS | HEART RATE: 76 BPM | DIASTOLIC BLOOD PRESSURE: 80 MMHG

## 2019-04-03 DIAGNOSIS — N39.41 URGENCY INCONTINENCE: ICD-10-CM

## 2019-04-03 DIAGNOSIS — Z01.818 PRE-OPERATIVE CLEARANCE: Primary | ICD-10-CM

## 2019-04-03 DIAGNOSIS — E78.2 MIXED HYPERLIPIDEMIA: ICD-10-CM

## 2019-04-03 PROCEDURE — 99214 OFFICE O/P EST MOD 30 MIN: CPT | Performed by: INTERNAL MEDICINE

## 2019-05-23 ENCOUNTER — TRANSCRIBE ORDERS (OUTPATIENT)
Dept: LAB | Age: 69
End: 2019-05-23

## 2019-05-23 ENCOUNTER — OFFICE VISIT (OUTPATIENT)
Dept: INTERNAL MEDICINE CLINIC | Age: 69
End: 2019-05-23
Payer: MEDICARE

## 2019-05-23 ENCOUNTER — APPOINTMENT (OUTPATIENT)
Dept: LAB | Age: 69
End: 2019-05-23
Payer: MEDICARE

## 2019-05-23 VITALS
TEMPERATURE: 98 F | HEART RATE: 73 BPM | WEIGHT: 177.4 LBS | OXYGEN SATURATION: 97 % | DIASTOLIC BLOOD PRESSURE: 60 MMHG | SYSTOLIC BLOOD PRESSURE: 110 MMHG | BODY MASS INDEX: 32.96 KG/M2

## 2019-05-23 DIAGNOSIS — E78.5 HYPERLIPIDEMIA, UNSPECIFIED HYPERLIPIDEMIA TYPE: ICD-10-CM

## 2019-05-23 DIAGNOSIS — G47.9 REPETITIVE INTRUSIONS OF SLEEP: ICD-10-CM

## 2019-05-23 DIAGNOSIS — R53.83 FATIGUE, UNSPECIFIED TYPE: Primary | ICD-10-CM

## 2019-05-23 DIAGNOSIS — Z96.651 S/P TKR (TOTAL KNEE REPLACEMENT), RIGHT: ICD-10-CM

## 2019-05-23 DIAGNOSIS — G47.9 SLEEP DISTURBANCE: ICD-10-CM

## 2019-05-23 DIAGNOSIS — K21.9 GASTROESOPHAGEAL REFLUX DISEASE WITHOUT ESOPHAGITIS: ICD-10-CM

## 2019-05-23 DIAGNOSIS — F40.243 FEAR OF FLYING: ICD-10-CM

## 2019-05-23 DIAGNOSIS — R53.83 OTHER FATIGUE: Primary | ICD-10-CM

## 2019-05-23 DIAGNOSIS — R53.83 FATIGUE, UNSPECIFIED TYPE: ICD-10-CM

## 2019-05-23 DIAGNOSIS — K59.00 CONSTIPATION, UNSPECIFIED CONSTIPATION TYPE: ICD-10-CM

## 2019-05-23 DIAGNOSIS — F41.9 ANXIETY: ICD-10-CM

## 2019-05-23 DIAGNOSIS — Z11.59 NEED FOR HEPATITIS C SCREENING TEST: ICD-10-CM

## 2019-05-23 LAB
ALBUMIN SERPL BCP-MCNC: 3.7 G/DL (ref 3.5–5)
ALP SERPL-CCNC: 116 U/L (ref 46–116)
ALT SERPL W P-5'-P-CCNC: 20 U/L (ref 12–78)
ANION GAP SERPL CALCULATED.3IONS-SCNC: 8 MMOL/L (ref 4–13)
AST SERPL W P-5'-P-CCNC: 9 U/L (ref 5–45)
BASOPHILS # BLD AUTO: 0.11 THOUSANDS/ΜL (ref 0–0.1)
BASOPHILS NFR BLD AUTO: 1 % (ref 0–1)
BILIRUB SERPL-MCNC: 0.26 MG/DL (ref 0.2–1)
BUN SERPL-MCNC: 18 MG/DL (ref 5–25)
CALCIUM SERPL-MCNC: 8.9 MG/DL (ref 8.3–10.1)
CHLORIDE SERPL-SCNC: 108 MMOL/L (ref 100–108)
CO2 SERPL-SCNC: 23 MMOL/L (ref 21–32)
CREAT SERPL-MCNC: 0.7 MG/DL (ref 0.6–1.3)
EOSINOPHIL # BLD AUTO: 0.65 THOUSAND/ΜL (ref 0–0.61)
EOSINOPHIL NFR BLD AUTO: 6 % (ref 0–6)
ERYTHROCYTE [DISTWIDTH] IN BLOOD BY AUTOMATED COUNT: 14.2 % (ref 11.6–15.1)
FERRITIN SERPL-MCNC: 47 NG/ML (ref 8–388)
GFR SERPL CREATININE-BSD FRML MDRD: 89 ML/MIN/1.73SQ M
GLUCOSE SERPL-MCNC: 89 MG/DL (ref 65–140)
HCT VFR BLD AUTO: 38.5 % (ref 34.8–46.1)
HGB BLD-MCNC: 12.1 G/DL (ref 11.5–15.4)
IMM GRANULOCYTES # BLD AUTO: 0.03 THOUSAND/UL (ref 0–0.2)
IMM GRANULOCYTES NFR BLD AUTO: 0 % (ref 0–2)
IRON SERPL-MCNC: 45 UG/DL (ref 50–170)
LYMPHOCYTES # BLD AUTO: 3.6 THOUSANDS/ΜL (ref 0.6–4.47)
LYMPHOCYTES NFR BLD AUTO: 35 % (ref 14–44)
MCH RBC QN AUTO: 26.5 PG (ref 26.8–34.3)
MCHC RBC AUTO-ENTMCNC: 31.4 G/DL (ref 31.4–37.4)
MCV RBC AUTO: 84 FL (ref 82–98)
MONOCYTES # BLD AUTO: 0.82 THOUSAND/ΜL (ref 0.17–1.22)
MONOCYTES NFR BLD AUTO: 8 % (ref 4–12)
NEUTROPHILS # BLD AUTO: 5.16 THOUSANDS/ΜL (ref 1.85–7.62)
NEUTS SEG NFR BLD AUTO: 50 % (ref 43–75)
NRBC BLD AUTO-RTO: 0 /100 WBCS
PLATELET # BLD AUTO: 433 THOUSANDS/UL (ref 149–390)
PMV BLD AUTO: 12.3 FL (ref 8.9–12.7)
POTASSIUM SERPL-SCNC: 3.5 MMOL/L (ref 3.5–5.3)
PROT SERPL-MCNC: 7.2 G/DL (ref 6.4–8.2)
RBC # BLD AUTO: 4.56 MILLION/UL (ref 3.81–5.12)
SODIUM SERPL-SCNC: 139 MMOL/L (ref 136–145)
TIBC SERPL-MCNC: 383 UG/DL (ref 250–450)
TSH SERPL DL<=0.05 MIU/L-ACNC: 3.53 UIU/ML (ref 0.36–3.74)
WBC # BLD AUTO: 10.37 THOUSAND/UL (ref 4.31–10.16)

## 2019-05-23 PROCEDURE — 85025 COMPLETE CBC W/AUTO DIFF WBC: CPT

## 2019-05-23 PROCEDURE — 80053 COMPREHEN METABOLIC PANEL: CPT

## 2019-05-23 PROCEDURE — 36415 COLL VENOUS BLD VENIPUNCTURE: CPT

## 2019-05-23 PROCEDURE — 83550 IRON BINDING TEST: CPT

## 2019-05-23 PROCEDURE — 82728 ASSAY OF FERRITIN: CPT

## 2019-05-23 PROCEDURE — 86803 HEPATITIS C AB TEST: CPT

## 2019-05-23 PROCEDURE — 99214 OFFICE O/P EST MOD 30 MIN: CPT | Performed by: PHYSICIAN ASSISTANT

## 2019-05-23 PROCEDURE — 84443 ASSAY THYROID STIM HORMONE: CPT

## 2019-05-23 PROCEDURE — 83540 ASSAY OF IRON: CPT

## 2019-05-23 RX ORDER — LORAZEPAM 0.5 MG/1
0.5 TABLET ORAL DAILY PRN
Qty: 30 TABLET | Refills: 0 | Status: SHIPPED | OUTPATIENT
Start: 2019-05-23 | End: 2019-07-02 | Stop reason: SDUPTHER

## 2019-05-23 RX ORDER — OXYCODONE HYDROCHLORIDE AND ACETAMINOPHEN 5; 325 MG/1; MG/1
1 TABLET ORAL
Refills: 0 | COMMUNITY
Start: 2019-05-15 | End: 2019-07-02 | Stop reason: ALTCHOICE

## 2019-05-23 RX ORDER — SENNOSIDES 8.6 MG
1 TABLET ORAL
Qty: 30 EACH | Refills: 1 | Status: SHIPPED | OUTPATIENT
Start: 2019-05-23 | End: 2019-07-02 | Stop reason: ALTCHOICE

## 2019-05-23 RX ORDER — IBUPROFEN 800 MG/1
TABLET ORAL
Refills: 1 | COMMUNITY
Start: 2019-04-25 | End: 2020-11-27 | Stop reason: ALTCHOICE

## 2019-05-24 ENCOUNTER — TELEPHONE (OUTPATIENT)
Dept: INTERNAL MEDICINE CLINIC | Age: 69
End: 2019-05-24

## 2019-05-24 LAB — HCV AB SER QL: NORMAL

## 2019-05-30 ENCOUNTER — OFFICE VISIT (OUTPATIENT)
Dept: INTERNAL MEDICINE CLINIC | Age: 69
End: 2019-05-30
Payer: MEDICARE

## 2019-05-30 VITALS
TEMPERATURE: 98.1 F | HEART RATE: 80 BPM | SYSTOLIC BLOOD PRESSURE: 110 MMHG | WEIGHT: 176.6 LBS | OXYGEN SATURATION: 99 % | BODY MASS INDEX: 32.81 KG/M2 | DIASTOLIC BLOOD PRESSURE: 78 MMHG

## 2019-05-30 DIAGNOSIS — E03.9 HYPOTHYROIDISM, UNSPECIFIED TYPE: ICD-10-CM

## 2019-05-30 DIAGNOSIS — F32.9 REACTIVE DEPRESSION: ICD-10-CM

## 2019-05-30 DIAGNOSIS — D72.9 ABNORMAL WHITE BLOOD CELL (WBC): Primary | ICD-10-CM

## 2019-05-30 PROCEDURE — 99214 OFFICE O/P EST MOD 30 MIN: CPT | Performed by: INTERNAL MEDICINE

## 2019-05-30 RX ORDER — DIPHENOXYLATE HYDROCHLORIDE AND ATROPINE SULFATE 2.5; .025 MG/1; MG/1
1 TABLET ORAL DAILY
COMMUNITY
End: 2019-11-04

## 2019-05-30 RX ORDER — DULOXETIN HYDROCHLORIDE 30 MG/1
30 CAPSULE, DELAYED RELEASE ORAL DAILY
Qty: 30 CAPSULE | Refills: 0 | OUTPATIENT
Start: 2019-05-30 | End: 2019-06-21 | Stop reason: SDUPTHER

## 2019-06-19 ENCOUNTER — TELEPHONE (OUTPATIENT)
Dept: INTERNAL MEDICINE CLINIC | Age: 69
End: 2019-06-19

## 2019-06-19 LAB — TSH SERPL-ACNC: 4.16 MIU/L (ref 0.4–4.5)

## 2019-06-21 DIAGNOSIS — F32.9 REACTIVE DEPRESSION: ICD-10-CM

## 2019-06-23 RX ORDER — DULOXETIN HYDROCHLORIDE 30 MG/1
CAPSULE, DELAYED RELEASE ORAL
Qty: 30 CAPSULE | Refills: 0 | Status: SHIPPED | OUTPATIENT
Start: 2019-06-23 | End: 2019-11-04

## 2019-07-02 ENCOUNTER — OFFICE VISIT (OUTPATIENT)
Dept: INTERNAL MEDICINE CLINIC | Age: 69
End: 2019-07-02
Payer: MEDICARE

## 2019-07-02 VITALS
DIASTOLIC BLOOD PRESSURE: 78 MMHG | WEIGHT: 175.4 LBS | BODY MASS INDEX: 32.59 KG/M2 | SYSTOLIC BLOOD PRESSURE: 138 MMHG | TEMPERATURE: 98.4 F | OXYGEN SATURATION: 98 % | HEART RATE: 84 BPM

## 2019-07-02 DIAGNOSIS — E03.9 HYPOTHYROIDISM, ADULT: ICD-10-CM

## 2019-07-02 DIAGNOSIS — R11.12 PROJECTILE VOMITING WITHOUT NAUSEA: ICD-10-CM

## 2019-07-02 DIAGNOSIS — R10.30 LOWER ABDOMINAL PAIN: ICD-10-CM

## 2019-07-02 DIAGNOSIS — F41.9 ANXIETY: ICD-10-CM

## 2019-07-02 DIAGNOSIS — R05.9 COUGH: ICD-10-CM

## 2019-07-02 DIAGNOSIS — D50.9 IRON DEFICIENCY ANEMIA, UNSPECIFIED IRON DEFICIENCY ANEMIA TYPE: ICD-10-CM

## 2019-07-02 DIAGNOSIS — E78.2 MIXED HYPERLIPIDEMIA: ICD-10-CM

## 2019-07-02 DIAGNOSIS — K21.9 GASTROESOPHAGEAL REFLUX DISEASE WITHOUT ESOPHAGITIS: Primary | ICD-10-CM

## 2019-07-02 DIAGNOSIS — R11.11 VOMITING WITHOUT NAUSEA, INTRACTABILITY OF VOMITING NOT SPECIFIED, UNSPECIFIED VOMITING TYPE: ICD-10-CM

## 2019-07-02 DIAGNOSIS — F40.243 FEAR OF FLYING: ICD-10-CM

## 2019-07-02 DIAGNOSIS — R06.00 PND (PAROXYSMAL NOCTURNAL DYSPNEA): ICD-10-CM

## 2019-07-02 PROBLEM — K21.00 REFLUX ESOPHAGITIS: Status: ACTIVE | Noted: 2019-07-02

## 2019-07-02 PROCEDURE — 99214 OFFICE O/P EST MOD 30 MIN: CPT | Performed by: INTERNAL MEDICINE

## 2019-07-02 RX ORDER — FLUTICASONE PROPIONATE 50 MCG
1 SPRAY, SUSPENSION (ML) NASAL DAILY
Qty: 1 BOTTLE | Refills: 1 | Status: SHIPPED | OUTPATIENT
Start: 2019-07-02 | End: 2020-04-07

## 2019-07-02 RX ORDER — LORAZEPAM 0.5 MG/1
0.5 TABLET ORAL DAILY PRN
Qty: 30 TABLET | Refills: 0 | Status: SHIPPED | OUTPATIENT
Start: 2019-07-02 | End: 2019-11-26 | Stop reason: SDUPTHER

## 2019-07-02 NOTE — PROGRESS NOTES
Assessment/Plan: 1  Reflux with cough- needs EGD to cont  With Nexium  And consider carafate  2  Nocturnal vomiting: This may also be reflux but will check her ultrasound of her abdomen as well as blood test  3  Hyperlipidemia patient's last cholesterol was very high with a normal HDL  She needs to stay on a strict diet and repeat the lipid profile she probably needs to be on a statin  4  Pain in knee with inflammation status post replacement of the right knee  She does have good range of motion but lot of redness and warmth in the joint she was told to ice it down every 2-3 hours for 20 min  5  Low iron this may be secondary to  Surgery will try low dose Fe  6 GI referal     Diagnoses and all orders for this visit:    Gastroesophageal reflux disease without esophagitis  -     Ambulatory referral to Gastroenterology; Future  -     XR chest pa & lateral; Future  -     Hepatic function panel; Future    Anxiety  -     LORazepam (ATIVAN) 0 5 mg tablet; Take 1 tablet (0 5 mg total) by mouth daily as needed for anxiety    Fear of flying  -     LORazepam (ATIVAN) 0 5 mg tablet; Take 1 tablet (0 5 mg total) by mouth daily as needed for anxiety    Cough  -     XR chest pa & lateral; Future    Vomiting without nausea, intractability of vomiting not specified, unspecified vomiting type  -     US abdomen complete; Future    Iron deficiency anemia, unspecified iron deficiency anemia type  -     CBC and differential; Future    Projectile vomiting without nausea  -     Hepatic function panel; Future          Subjective:      Patient ID: Ellie Brown is a 78 y o  female  This is a case of a 79-year-old white female status post right knee replacement 2 months ago she is having good range of motion but a lot of inflammatory changes with pain  She is going to physical therapy doing relatively well again with some pain    Patient's other complaint is nocturnal cough she is already on Nexium for reflux cough is more prominent when she lies down occasionally wakes her up at night  And she occasionally has vomiting she will need an EGD and chest x-ray  The following portions of the patient's history were reviewed and updated as appropriate: allergies, current medications, past family history, past medical history, past social history, past surgical history and problem list     Review of Systems   Constitutional: Negative for appetite change, chills, diaphoresis, fatigue and fever  HENT: Positive for postnasal drip and rhinorrhea  Negative for congestion, nosebleeds, sinus pressure and sinus pain  Respiratory: Positive for cough (nocturnal)  Negative for choking, chest tightness, shortness of breath and wheezing  Cardiovascular: Positive for leg swelling (surgery rt )  Negative for chest pain  Gastrointestinal: Positive for vomiting  Negative for constipation, diarrhea, nausea and rectal pain  Genitourinary: Positive for frequency and urgency (urge incontinence)  Negative for difficulty urinating, dysuria, hematuria, pelvic pain, vaginal discharge and vaginal pain  Musculoskeletal: Positive for arthralgias, back pain, joint swelling and neck pain  S/p rt  Knee  swelling   Neurological: Negative for dizziness, tremors, syncope, weakness, light-headedness, numbness and headaches  Psychiatric/Behavioral: The patient is nervous/anxious  Objective:      /78 (BP Location: Left arm, Patient Position: Sitting, Cuff Size: Standard)   Pulse 84   Temp 98 4 °F (36 9 °C) (Tympanic)   Wt 79 6 kg (175 lb 6 4 oz)   SpO2 98%   BMI 32 59 kg/m²          Physical Exam   Constitutional: She is oriented to person, place, and time  She appears well-developed and well-nourished  No distress  obese   HENT:   Head: Atraumatic  Nose: Nose normal    Mouth/Throat: No oropharyngeal exudate  Eyes: Conjunctivae and EOM are normal  Right eye exhibits no discharge  Left eye exhibits no discharge   No scleral icterus  Neck: Normal range of motion  No JVD present  No tracheal deviation present  No thyromegaly present  Cardiovascular: Normal rate, regular rhythm and normal heart sounds  Exam reveals no friction rub  No murmur heard  Pulmonary/Chest: Effort normal and breath sounds normal  No respiratory distress  She has no wheezes  She has no rales  Abdominal: Soft  Bowel sounds are normal  She exhibits no distension and no mass  There is no tenderness  There is no guarding  Musculoskeletal: She exhibits edema  Scar of surgery   Neurological: She is alert and oriented to person, place, and time  She displays abnormal reflex  No cranial nerve deficit  Coordination normal    Skin: She is not diaphoretic  There is erythema  Psychiatric: She has a normal mood and affect  Her behavior is normal    Nursing note and vitals reviewed

## 2019-07-02 NOTE — PROGRESS NOTES
Assessment and Plan:     Problem List Items Addressed This Visit     None         History of Present Illness:     Patient presents for Medicare Annual Wellness visit    Patient Care Team:  Lety Aguilar MD as PCP - General  Paradise Hunter MD (Gastroenterology)  Destini Bravo MD (Ophthalmology)  Kenna Feng MD (Cardiology)  Keerthi Cordoba MD (Breast Surgery)  Ronny Hanley MD (Obstetrics and Gynecology)  Pan Vences   (Dermatology)  Sumeet Laienz DO (Orthopedic Surgery)  Ariella Jasso MD     Problem List:     Patient Active Problem List   Diagnosis    Lower abdominal pain    Abnormal LFTs    Allergic sinusitis    Arthritis of knee    Hyperlipidemia    Hypothyroidism, adult    Viral URI      Past Medical and Surgical History:     Past Medical History:   Diagnosis Date    Anxiety     Last Assessed: [id number]    Arthritis     Last Assessed: [id number]    GERD (gastroesophageal reflux disease)     Last Assessed: 31Oct2017    Hypothyroidism, adult 10/31/2017    Osteoporosis     Last Assessed: 31Oct2017    Reflux esophagitis     Urinary incontinence     Last Assessed: 31Oct2017     Past Surgical History:   Procedure Laterality Date    CHOLECYSTECTOMY      GALLBLADDER SURGERY      Last Assessed: [id number]    INCISIONAL BREAST BIOPSY      Last Assessed: [id number]   [facility] KNEE SURGERY      Last Assessed: [id number]    [place] IMPLANT PERIPH/GASTRIC NEUROSTIM/ N/A 3/23/2016    Procedure:  REMOVAL IPG BATTERY ;  Surgeon: Leslie Duke MD;  Location: [facility];  Service: UroGynecology           REPLACEMENT TOTAL KNEE Right 04/29/2019    ROOT CANAL  03/11/2019    SHOULDER SURGERY Left     SHOULDER SURGERY      Last Assessed: [id number]    TONSILLECTOMY      last Assessed: 30Oct65    WISDOM TOOTH EXTRACTION      Last Assessed: 30Oct65      Family History:     Family History   Problem Relation Age of Onset    Lupus Mother    [facility] Other Mother         Cardiac Disorder  Other Father         Cardiac Disorder    Diabetes Father     Breast cancer Maternal Aunt     Breast cancer Cousin       Social History:     Social History     Tobacco Use   Smoking Status Never Smoker   Smokeless Tobacco Never Used     Social History     Substance and Sexual Activity   Alcohol Use Yes    Frequency: 2-4 times a month    Drinks per session: 1 or 2    Binge frequency: Never    Comment: Social - wine     Social History     Substance and Sexual Activity   Drug Use No      Medications and Allergies:     Current Outpatient Medications   Medication Sig Dispense Refill    aspirin 81 MG tablet Take 81 mg by mouth daily        cholecalciferol (VITAMIN D3) 1,000 units tablet Take 1,000 Units by mouth daily       cyanocobalamin (VITAMIN B-12) 1,000 mcg tablet Take 1,000 mcg by mouth daily      diclofenac sodium (VOLTAREN) 1 % Apply 4 g topically 2 (two) times a day as needed (knee pain) 1 Tube 1    DULoxetine (CYMBALTA) 30 mg delayed release capsule TAKE 1 CAPSULE BY MOUTH EVERY DAY 30 capsule 0    esomeprazole (NexIUM) 20 mg capsule Take 40 mg by mouth daily at bedtime        fexofenadine (ALLEGRA) 180 MG tablet Take 1 tablet by mouth daily      ibuprofen (MOTRIN) 800 mg tablet TAKE 1 TABLET BY MOUTH THREE TIMES A DAY WITH FOOD AS NEEDED  1    Iron-Folic Acid-Vit B68 (IRON FORMULA PO) Take 1 tablet by mouth every other day      LORazepam (ATIVAN) 0 5 mg tablet Take 1 tablet (0 5 mg total) by mouth daily as needed for anxiety 30 tablet 0    multivitamin (THERAGRAN) TABS Take 1 tablet by mouth daily      naproxen sodium (ALEVE) 220 MG tablet Take 220 mg by mouth daily as needed        nystatin (MYCOSTATIN) powder Apply topically 3 (three) times a day 56 7 g 1    oxyCODONE-acetaminophen (PERCOCET) 5-325 mg per tablet Take 1 tablet by mouth every 4 to 6 hours as needed for pain  0    senna (SENOKOT) 8 6 mg Take 1 tablet (8 6 mg total) by mouth daily at bedtime 30 each 1     No current facility-administered medications for this visit  Allergies   Allergen Reactions    Sulfa Antibiotics Rash and Edema      Immunizations:     Immunization History   Administered Date(s) Administered    Influenza Split High Dose Preservative Free IM 10/31/2017    Influenza, high dose seasonal 0 5 mL 09/20/2018    Pneumococcal Conjugate 13-Valent 11/01/2018      Medicare Screening Tests and Risk Assessments:     Alyx Thibodeaux is here for her Initial Wellness visit  Health Risk Assessment:  Patient rates overall health as very good  Patient feels that their physical health rating is Same  Eyesight was rated as Same  Hearing was rated as Same  Patient feels that their emotional and mental health rating is Same  Pain experienced by patient in the last 7 days has been A lot  Patient's pain rating has been 8/10  Emotional/Mental Health:  Patient has been feeling nervous/anxious  PHQ-9 Depression Screening:    Frequency of the following problems over the past two weeks:      1  Little interest or pleasure in doing things: 0 - not at all      2  Feeling down, depressed, or hopeless: 0 - not at all  PHQ-2 Score: 0          Broken Bones/Falls: Fall Risk Assessment:    In the past year, patient has experienced: No history of falling in past year          Bladder/Bowel:  Patient has leaked urine accidently in the last six months  Patient reports no loss of bowel control  Immunizations:  Patient has had a flu vaccination within the last year  Patient has received a pneumonia shot  Home Safety:  Patient does not have trouble with stairs inside or outside of their home  Patient currently reports that there are no safety hazards present in home, working smoke alarms, working carbon monoxide detectors        Preventative Screenings:   No breast cancer screening performed, no colon cancer screen completed, cholesterol screen completed, glaucoma eye exam completed,     Nutrition:  Current diet: Regular, No Added Salt and Limited junk food with servings of the following:    Medications:  Patient is currently taking over-the-counter supplements  Patient is able to manage medications  Lifestyle Choices:  Patient reports no tobacco use  Patient has not smoked or used tobacco in the past   Patient reports alcohol use  Alcohol use per week: 1  Patient drives a vehicle  Patient wears seat belt  Current level of exercise of physical activity described by patient as: active  Activities of Daily Living:  Can get out of bed by his or her self, able to dress self, able to make own meals, able to do own shopping, able to bathe self, can do own laundry/housekeeping, can manage own money, pay bills and track expenses    Previous Hospitalizations:  Hospitalization or ED visit in past 12 months  Number of hospitalizations within the last year: 1-2        Advanced Directives:  Patient has decided on a power of   Patient has spoken to designated power of   Patient has completed advanced directive

## 2019-07-02 NOTE — PATIENT INSTRUCTIONS
Obesity   AMBULATORY CARE:   Obesity  is when your body mass index (BMI) is greater than 30  Your healthcare provider will use your height and weight to measure your BMI  The risks of obesity include  many health problems, such as injuries or physical disability  You may need tests to check for the following:  · Diabetes     · High blood pressure or high cholesterol     · Heart disease     · Gallbladder or liver disease     · Cancer of the colon, breast, prostate, liver, or kidney     · Sleep apnea     · Arthritis or gout  Seek care immediately if:   · You have a severe headache, confusion, or difficulty speaking  · You have weakness on one side of your body  · You have chest pain, sweating, or shortness of breath  Contact your healthcare provider if:   · You have symptoms of gallbladder or liver disease, such as pain in your upper abdomen  · You have knee or hip pain and discomfort while walking  · You have symptoms of diabetes, such as intense hunger and thirst, and frequent urination  · You have symptoms of sleep apnea, such as snoring or daytime sleepiness  · You have questions or concerns about your condition or care  Treatment for obesity  focuses on helping you lose weight to improve your health  Even a small decrease in BMI can reduce the risk for many health problems  Your healthcare provider will help you set a weight-loss goal   · Lifestyle changes  are the first step in treating obesity  These include making healthy food choices and getting regular physical activity  Your healthcare provider may suggest a weight-loss program that involves coaching, education, and therapy  · Medicine  may help you lose weight when it is used with a healthy diet and physical activity  · Surgery  can help you lose weight if you are very obese and have other health problems  There are several types of weight-loss surgery  Ask your healthcare provider for more information    Be successful losing weight:   · Set small, realistic goals  An example of a small goal is to walk for 20 minutes 5 days a week  Anther goal is to lose 5% of your body weight  · Tell friends, family members, and coworkers about your goals  and ask for their support  Ask a friend to lose weight with you, or join a weight-loss support group  · Identify foods or triggers that may cause you to overeat , and find ways to avoid them  Remove tempting high-calorie foods from your home and workplace  Place a bowl of fresh fruit on your kitchen counter  If stress causes you to eat, then find other ways to cope with stress  · Keep a diary to track what you eat and drink  Also write down how many minutes of physical activity you do each day  Weigh yourself once a week and record it in your diary  Eating changes: You will need to eat 500 to 1,000 fewer calories each day than you currently eat to lose 1 to 2 pounds a week  The following changes will help you cut calories:  · Eat smaller portions  Use small plates, no larger than 9 inches in diameter  Fill your plate half full of fruits and vegetables  Measure your food using measuring cups until you know what a serving size looks like  · Eat 3 meals and 1 or 2 snacks each day  Plan your meals in advance  Naomi Pel and eat at home most of the time  Eat slowly  · Eat fruits and vegetables at every meal   They are low in calories and high in fiber, which makes you feel full  Do not add butter, margarine, or cream sauce to vegetables  Use herbs to season steamed vegetables  · Eat less fat and fewer fried foods  Eat more baked or grilled chicken and fish  These protein sources are lower in calories and fat than red meat  Limit fast food  Dress your salads with olive oil and vinegar instead of bottled dressing  · Limit the amount of sugar you eat  Do not drink sugary beverages  Limit alcohol  Activity changes:  Physical activity is good for your body in many ways   It helps you burn calories and build strong muscles  It decreases stress and depression, and improves your mood  It can also help you sleep better  Talk to your healthcare provider before you begin an exercise program   · Exercise for at least 30 minutes 5 days a week  Start slowly  Set aside time each day for physical activity that you enjoy and that is convenient for you  It is best to do both weight training and an activity that increases your heart rate, such as walking, bicycling, or swimming  · Find ways to be more active  Do yard work and housecleaning  Walk up the stairs instead of using elevators  Spend your leisure time going to events that require walking, such as outdoor festivals or fairs  This extra physical activity can help you lose weight and keep it off  Follow up with your healthcare provider as directed: You may need to meet with a dietitian  Write down your questions so you remember to ask them during your visits  © 2017 [address] Information is for End User's use only and may not be sold, redistributed or otherwise used for commercial purposes  All illustrations and images included in CareNotes® are the copyrighted property of [employer]  or [employer]  The above information is an  only  It is not intended as medical advice for individual conditions or treatments  Talk to your doctor, nurse or pharmacist before following any medical regimen to see if it is safe and effective for you  Urinary Incontinence   WHAT YOU NEED TO KNOW:   What is urinary incontinence? Urinary incontinence (UI) is when you lose control of your bladder  What causes UI? UI occurs because your bladder cannot store or empty urine properly  The following are the most common types of UI:  · Stress incontinence  is when you leak urine due to increased bladder pressure  This may happen when you cough, sneeze, or exercise       · Urge incontinence  is when you feel the need to urinate right away and leak urine accidentally  · Mixed incontinence  is when you have both stress and urge UI  What are the signs and symptoms of UI?   · You feel like your bladder does not empty completely when you urinate  · You urinate often and need to urinate immediately  · You leak urine when you sleep, or you wake up with the urge to urinate  · You leak urine when you cough, sneeze, exercise, or laugh  How is UI diagnosed? Your healthcare provider will ask how often you leak urine and whether you have stress or urge symptoms  Tell him which medicines you take, how often you urinate, and how much liquid you drink each day  You may need any of the following tests:  · Urine tests  may show infection or kidney function  · A pelvic exam  may be done to check for blockages  A pelvic exam will also show if your bladder, uterus, or other organs have moved out of place  · An x-ray, ultrasound, or CT  may show problems with parts of your urinary system  You may be given contrast liquid to help your organs show up better in the pictures  Tell the healthcare provider if you have ever had an allergic reaction to contrast liquid  Do not enter the MRI room with anything metal  Metal can cause serious injury  Tell the healthcare provider if you have any metal in or on your body  · A bladder scan  will show how much urine is left in your bladder after you urinate  You will be asked to urinate and then healthcare providers will use a small ultrasound machine to check the urine left in your bladder  · Cystometry  is used to check the function of your urinary system  Your healthcare provider checks the pressure in your bladder while filling it with fluid  Your bladder pressure may also be tested when your bladder is full and while you urinate  How is UI treated? · Medicines  can help strengthen your bladder control      · Electrical stimulation  is used to send a small amount of electrical energy to your pelvic floor muscles  This helps control your bladder function  Electrodes may be placed outside your body or in your rectum  For women, the electrodes may be placed in the vagina  · A bulking agent  may be injected into the wall of your urethra to make it thicker  This helps keep your urethra closed and decreases urine leakage  · Devices  such as a clamp, pessary, or tampon may help stop urine leaks  Ask your healthcare provider for more information about these and other devices  · Surgery  may be needed if other treatments do not work  Several types of surgery can help improve your bladder control  Ask your healthcare provider for more information about the surgery you may need  How can I manage my symptoms? · Do pelvic muscle exercises often  Your pelvic muscles help you stop urinating  Squeeze these muscles tight for 5 seconds, then relax for 5 seconds  Gradually work up to squeezing for 10 seconds  Do 3 sets of 15 repetitions a day, or as directed  This will help strengthen your pelvic muscles and improve bladder control  · A catheter  may be used to help empty your bladder  A catheter is a tiny, plastic tube that is put into your bladder to drain your urine  Your healthcare provider may tell you to use a catheter to prevent your bladder from getting too full and leaking urine  · Keep a UI record  Write down how often you leak urine and how much you leak  Make a note of what you were doing when you leaked urine  · Train your bladder  Go to the bathroom at set times, such as every 2 hours, even if you do not feel the urge to go  You can also try to hold your urine when you feel the urge to go  For example, hold your urine for 5 minutes when you feel the urge to go  As that becomes easier, hold your urine for 10 minutes  · Drink liquids as directed  Ask your healthcare provider how much liquid to drink each day and which liquids are best for you   You may need to limit the amount of liquid you drink to help control your urine leakage  Limit or do not have drinks that contain caffeine or alcohol  Do not drink any liquid right before you go to bed  · Prevent constipation  Eat a variety of high-fiber foods  Good examples are high-fiber cereals, beans, vegetables, and whole-grain breads  Prune juice may help make your bowel movement softer  Walking is the best way to trigger your intestines to have a bowel movement  · Exercise regularly and maintain a healthy weight  Ask your healthcare provider how much you should weigh and about the best exercise plan for you  Weight loss and exercise will decrease pressure on your bladder and help you control your leakage  Ask him to help you create a weight loss plan if you are overweight  When should I seek immediate care? · You have severe pain  · You are confused or cannot think clearly  When should I contact my healthcare provider? · You have a fever  · You see blood in your urine  · You have pain when you urinate  · You have new or worse pain, even after treatment  · Your mouth feels dry or you have vision changes  · Your urine is cloudy or smells bad  · You have questions or concerns about your condition or care  CARE AGREEMENT:   You have the right to help plan your care  Learn about your health condition and how it may be treated  Discuss treatment options with your caregivers to decide what care you want to receive  You always have the right to refuse treatment  The above information is an  only  It is not intended as medical advice for individual conditions or treatments  Talk to your doctor, nurse or pharmacist before following any medical regimen to see if it is safe and effective for you  © 2017 [facility] [place] Information is for End User's use only and may not be sold, redistributed or otherwise used for commercial purposes   All illustrations and images included in CareNotes® are the copyrighted property of [employer] M [employer] Inc  or [employer]  Cigarette Smoking and Your Health   AMBULATORY CARE:   Risks to your health if you smoke:  Nicotine and other chemicals found in tobacco damage every cell in your body  Even if you are a light smoker, you have an increased risk for cancer, heart disease, and lung disease  If you are pregnant or have diabetes, smoking increases your risk for complications  Benefits to your health if you stop smoking:   · You decrease respiratory symptoms such as coughing, wheezing, and shortness of breath  · You reduce your risk for cancers of the lung, mouth, throat, kidney, bladder, pancreas, stomach, and cervix  If you already have cancer, you increase the benefits of chemotherapy  You also reduce your risk for cancer returning or a second cancer from developing  · You reduce your risk for heart disease, blood clots, heart attack, and stroke  · You reduce your risk for lung infections, and diseases such as pneumonia, asthma, chronic bronchitis, and emphysema  · Your circulation improves  More oxygen can be delivered to your body  If you have diabetes, you lower your risk for complications, such as kidney, artery, and eye diseases  You also lower your risk for nerve damage  Nerve damage can lead to amputations, poor vision, and blindness  · You improve your body's ability to heal and to fight infections  Benefits to the health of others if you stop smoking:  Tobacco is harmful to nonsmokers who breathe in your secondhand smoke  The following are ways the health of others around you may improve when you stop smoking:  · You lower the risks for lung cancer and heart disease in nonsmoking adults  · If you are pregnant, you lower the risk for miscarriage, early delivery, low birth weight, and stillbirth  You also lower your baby's risk for SIDS, obesity, developmental delay, and neurobehavioral problems, such as ADHD  · If you have children, you lower their risk for ear infections, colds, pneumonia, bronchitis, and asthma  For more information and support to stop smoking:   · Smokefree  gov  Phone: 5- 299 - 751-0498  Web Address: www smokefree  gov  Follow up with your healthcare provider as directed:  Write down your questions so you remember to ask them during your visits  © 2017 [address] Information is for End User's use only and may not be sold, redistributed or otherwise used for commercial purposes  All illustrations and images included in CareNotes® are the copyrighted property of [employer]  or [employer]  The above information is an  only  It is not intended as medical advice for individual conditions or treatments  Talk to your doctor, nurse or pharmacist before following any medical regimen to see if it is safe and effective for you  Fall Prevention   AMBULATORY CARE:   Fall prevention  includes ways to make your home and other areas safer  It also includes ways you can move more carefully to prevent a fall  Health conditions that cause changes in your blood pressure, vision, or muscle strength and coordination may increase your risk for falls  Medicines may also increase your risk for falls if they make you dizzy, weak, or sleepy  Call 911 or have someone else call if:   · You have fallen and are unconscious  · You have fallen and cannot move part of your body  Contact your healthcare provider if:   · You have fallen and have pain or a headache  · You have questions or concerns about your condition or care  Fall prevention tips:   · Stand or sit up slowly  This may help you keep your balance and prevent falls  · Use assistive devices as directed  Your healthcare provider may suggest that you use a cane or walker to help you keep your balance  You may need to have grab bars put in your bathroom near the toilet or in the shower      · Wear shoes that fit well and have soles that   Wear shoes both inside and outside  Use slippers with good   Do not wear shoes with high heels  · Wear a personal alarm  This is a device that allows you to call 911 if you fall and need help  Ask your healthcare provider for more information  · Stay active  Exercise can help strengthen your muscles and improve your balance  Your healthcare provider may recommend water aerobics or walking  He or she may also recommend physical therapy to improve your coordination  Never start an exercise program without talking to your healthcare provider first      · Manage your medical conditions  Keep all appointments with your healthcare providers  Visit your eye doctor as directed  Home safety tips:   · Add items to prevent falls in the bathroom  Put nonslip strips on your bath or shower floor to prevent you from slipping  Use a bath mat if you do not have carpet in the bathroom  This will prevent you from falling when you step out of the bath or shower  Use a shower seat so you do not need to stand while you shower  Sit on the toilet or a chair in your bathroom to dry yourself and put on clothing  This will prevent you from losing your balance from drying or dressing yourself while you are standing  · Keep paths clear  Remove books, shoes, and other objects from walkways and stairs  Place cords for telephones and lamps out of the way so that you do not need to walk over them  Tape them down if you cannot move them  Remove small rugs  If you cannot remove a rug, secure it with double-sided tape  This will prevent you from tripping  · Install bright lights in your home  Use night lights to help light paths to the bathroom or kitchen  Always turn on the light before you start walking  · Keep items you use often on shelves within reach  Do not use a step stool to help you reach an item  · Paint or place reflective tape on the edges of your stairs    This will help you see the stairs better  Follow up with your healthcare provider as directed:  Write down your questions so you remember to ask them during your visits  © 2017 [address] Information is for End User's use only and may not be sold, redistributed or otherwise used for commercial purposes  All illustrations and images included in CareNotes® are the copyrighted property of [employer]  or [employer]  The above information is an  only  It is not intended as medical advice for individual conditions or treatments  Talk to your doctor, nurse or pharmacist before following any medical regimen to see if it is safe and effective for you  Advance Directives   WHAT YOU NEED TO KNOW:   What are advance directives? Advance directives are legal documents that state your wishes and plans for medical care  These plans are made ahead of time in case you lose your ability to make decisions for yourself  Advance directives can apply to any medical decision, such as the treatments you want, and if you want to donate organs  What are the types of advance directives? There are many types of advance directives, and each state has rules about how to use them  You may choose a combination of any of the following:  · Living will: This is a written record of the treatment you want  You can also choose which treatments you do not want, which to limit, and which to stop at a certain time  This includes surgery, medicine, IV fluid, and tube feedings  · Durable power of  for healthcare [facility]): This is a written record that states who you want to make healthcare choices for you when you are unable to make them for yourself  This person, called a proxy, is usually a family member or a friend  You may choose more than 1 proxy  · Do not resuscitate (DNR) order:  A DNR order is used in case your heart stops beating or you stop breathing   It is a request not to have certain forms of treatment, such as CPR  A DNR order may be included in other types of advance directives  · Medical directive: This covers the care that you want if you are in a coma, near death, or unable to make decisions for yourself  You can list the treatments you want for each condition  Treatment may include pain medicine, surgery, blood transfusions, dialysis, IV or tube feedings, and a ventilator (breathing machine)  · Values history: This document has questions about your views, beliefs, and how you feel and think about life  This information can help others choose the care that you would choose  Why are advance directives important? An advance directive helps you control your care  Although spoken wishes may be used, it is better to have your wishes written down  Spoken wishes can be misunderstood, or not followed  Treatments may be given even if you do not want them  An advance directive may make it easier for your family to make difficult choices about your care  How do I decide what to put in my advance directives? · Make informed decisions:  Make sure you fully understand treatments or care you may receive  Think about the benefits and problems your decisions could cause for you or your family  Talk to healthcare providers if you have concerns or questions before you write down your wishes  You may also want to talk with your Religion or , or a   Check your state laws to make sure that what you put in your advance directive is legal      · Sign all forms:  Sign and date your advance directive when you have finished  You may also need 2 witnesses to sign the forms  Witnesses cannot be your doctor or his staff, your spouse, heirs or beneficiaries, people you owe money to, or your chosen proxy  Talk to your family, proxy, and healthcare providers about your advance directive  Give each person a copy, and keep one for yourself in a place you can get to easily   Do not keep it hidden or locked away  · Review and revise your plans: You can revise your advance directive at any time, as long as you are able to make decisions  Review your plan every year, and when there are changes in your life, or your health  When you make changes, let your family, proxy, and healthcare providers know  Give each a new copy  Where can I find more information? · [employer]  [address] , [place] [address]  Phone: 5- [phone] - [phone]  Phone: 9- [phone] - [phone]  Web Address: http://www  aafp org  · [address] [facility])  [address], [address] [facility] , [address] [place] [address]  Phone: 1- [phone] - [phone]  Phone: [phone]  Web Address: Trevor caldwell  CARE AGREEMENT:   You have the right to help plan your care  To help with this plan, you must learn about your health condition and treatment options  You must also learn about advance directives and how they are used  Work with your healthcare providers to decide what care will be used to treat you  You always have the right to refuse treatment  The above information is an  only  It is not intended as medical advice for individual conditions or treatments  Talk to your doctor, nurse or pharmacist before following any medical regimen to see if it is safe and effective for you  © 2017 [address]  Information is for End User's use only and may not be sold, redistributed or otherwise used for commercial purposes  All illustrations and images included in CareNotes® are the copyrighted property of [employer]  or [employer]  1  Because your iron given 2 diarrhea are I would reduce it to once a week  This still continues to DC at the last iron level was 45 which was low but not severely low    2  Because of your nocturnal vomiting as well as cough I suggest to get an EGD and the choices yours with the [address] [address] people but I would recommend Dr Abel Og  3  Continue on Nexium for your GERD and till after year EGD and then further recommendations will be given at this time you could try something like Carafate if the EGD is going to be delayed  4  You should get repeat blood work in approximately 3-4 weeks  5  Make an appointment to complete her AW of the in the next month or so

## 2019-07-11 DIAGNOSIS — F41.9 ANXIETY: ICD-10-CM

## 2019-07-11 DIAGNOSIS — F40.243 FEAR OF FLYING: ICD-10-CM

## 2019-07-11 RX ORDER — LORAZEPAM 0.5 MG/1
0.5 TABLET ORAL DAILY PRN
Qty: 30 TABLET | Refills: 0 | OUTPATIENT
Start: 2019-07-11

## 2019-07-17 ENCOUNTER — HOSPITAL ENCOUNTER (OUTPATIENT)
Dept: ULTRASOUND IMAGING | Facility: HOSPITAL | Age: 69
Discharge: HOME | End: 2019-07-17
Payer: MEDICARE

## 2019-07-17 ENCOUNTER — HOSPITAL ENCOUNTER (OUTPATIENT)
Dept: RADIOLOGY | Facility: HOSPITAL | Age: 69
Discharge: HOME | End: 2019-07-17
Payer: MEDICARE

## 2019-07-17 DIAGNOSIS — K21.9 GASTROESOPHAGEAL REFLUX DISEASE WITHOUT ESOPHAGITIS: ICD-10-CM

## 2019-07-17 DIAGNOSIS — R11.11 VOMITING WITHOUT NAUSEA, INTRACTABILITY OF VOMITING NOT SPECIFIED, UNSPECIFIED VOMITING TYPE: ICD-10-CM

## 2019-07-17 DIAGNOSIS — R05.9 COUGH: ICD-10-CM

## 2019-07-17 PROCEDURE — 76700 US EXAM ABDOM COMPLETE: CPT

## 2019-07-17 PROCEDURE — 71046 X-RAY EXAM CHEST 2 VIEWS: CPT

## 2019-08-02 DIAGNOSIS — R06.00 PND (PAROXYSMAL NOCTURNAL DYSPNEA): ICD-10-CM

## 2019-08-05 RX ORDER — FLUTICASONE PROPIONATE 50 MCG
SPRAY, SUSPENSION (ML) NASAL
Qty: 16 ML | Refills: 1 | OUTPATIENT
Start: 2019-08-05

## 2019-08-06 LAB
ALBUMIN SERPL-MCNC: 4.1 G/DL (ref 3.6–5.1)
ALBUMIN/GLOB SERPL: 1.5 (CALC) (ref 1–2.5)
ALP SERPL-CCNC: 115 U/L (ref 33–130)
ALT SERPL-CCNC: 15 U/L (ref 6–29)
AST SERPL-CCNC: 11 U/L (ref 10–35)
BASOPHILS # BLD AUTO: 66 CELLS/UL (ref 0–200)
BASOPHILS NFR BLD AUTO: 0.7 %
BILIRUB DIRECT SERPL-MCNC: 0 MG/DL
BILIRUB INDIRECT SERPL-MCNC: 0.3 MG/DL (CALC) (ref 0.2–1.2)
BILIRUB SERPL-MCNC: 0.3 MG/DL (ref 0.2–1.2)
EOSINOPHIL # BLD AUTO: 517 CELLS/UL (ref 15–500)
EOSINOPHIL NFR BLD AUTO: 5.5 %
ERYTHROCYTE [DISTWIDTH] IN BLOOD BY AUTOMATED COUNT: 13.1 % (ref 11–15)
GLOBULIN SER CALC-MCNC: 2.8 G/DL (CALC) (ref 1.9–3.7)
HCT VFR BLD AUTO: 38.7 % (ref 35–45)
HGB BLD-MCNC: 12.2 G/DL (ref 11.7–15.5)
LYMPHOCYTES # BLD AUTO: 3234 CELLS/UL (ref 850–3900)
LYMPHOCYTES NFR BLD AUTO: 34.4 %
MCH RBC QN AUTO: 26.1 PG (ref 27–33)
MCHC RBC AUTO-ENTMCNC: 31.5 G/DL (ref 32–36)
MCV RBC AUTO: 82.7 FL (ref 80–100)
MONOCYTES # BLD AUTO: 667 CELLS/UL (ref 200–950)
MONOCYTES NFR BLD AUTO: 7.1 %
NEUTROPHILS # BLD AUTO: 4916 CELLS/UL (ref 1500–7800)
NEUTROPHILS NFR BLD AUTO: 52.3 %
PLATELET # BLD AUTO: 315 THOUSAND/UL (ref 140–400)
PMV BLD REES-ECKER: 12.3 FL (ref 7.5–12.5)
PROT SERPL-MCNC: 6.9 G/DL (ref 6.1–8.1)
RBC # BLD AUTO: 4.68 MILLION/UL (ref 3.8–5.1)
WBC # BLD AUTO: 9.4 THOUSAND/UL (ref 3.8–10.8)

## 2019-08-07 LAB
CHOLEST SERPL-MCNC: 224 MG/DL
CHOLEST/HDLC SERPL: 4.4 (CALC)
HDLC SERPL-MCNC: 51 MG/DL
LDLC SERPL CALC-MCNC: 147 MG/DL (CALC)
NONHDLC SERPL-MCNC: 173 MG/DL (CALC)
TRIGL SERPL-MCNC: 133 MG/DL

## 2019-08-22 ENCOUNTER — OFFICE VISIT (OUTPATIENT)
Dept: INTERNAL MEDICINE CLINIC | Age: 69
End: 2019-08-22
Payer: MEDICARE

## 2019-08-22 VITALS
DIASTOLIC BLOOD PRESSURE: 58 MMHG | HEIGHT: 62 IN | TEMPERATURE: 98.5 F | OXYGEN SATURATION: 98 % | SYSTOLIC BLOOD PRESSURE: 136 MMHG | BODY MASS INDEX: 32.2 KG/M2 | WEIGHT: 175 LBS | HEART RATE: 80 BPM

## 2019-08-22 DIAGNOSIS — L91.8 SKIN TAG: ICD-10-CM

## 2019-08-22 DIAGNOSIS — Z00.00 ENCOUNTER FOR MEDICARE ANNUAL WELLNESS EXAM: ICD-10-CM

## 2019-08-22 DIAGNOSIS — E78.5 HYPERLIPIDEMIA, UNSPECIFIED HYPERLIPIDEMIA TYPE: Primary | ICD-10-CM

## 2019-08-22 PROCEDURE — 99213 OFFICE O/P EST LOW 20 MIN: CPT | Performed by: PHYSICIAN ASSISTANT

## 2019-08-22 PROCEDURE — G0439 PPPS, SUBSEQ VISIT: HCPCS | Performed by: PHYSICIAN ASSISTANT

## 2019-08-22 NOTE — PROGRESS NOTES
Assessment/Plan:         Diagnoses and all orders for this visit:    Hyperlipidemia, unspecified hyperlipidemia type  Comments:  discussed low chol diet, increase daily aerobic exercise when able to with knee pain and swelling  f/u in 3 months, pt urged to consider statin therapy     Encounter for Medicare annual wellness exam  Comments:  vaccines reviewed  would recommend pneumovax after 11/2019  tdap due, pt will consider     Skin tag  Comments:  pt to make f/u with Dr Sherrell Severance to have these removed as they are on her face          Subjective:      Patient ID: Laura Solares is a 78 y o  female  Discussed with pt labs - chol elevated, discussed risk score  Pt has not been willing to take statins due to concern for side effects  She will discuss this with dr busby on f/u  She has also been unable to exercise much since her knee surgery 3 months ago  She continues with PT 2 times weekly but does still have sig pain and edema     The 10-year ASCVD risk score (Korey Weiss et al , 2013) is: 11 1%    Values used to calculate the score:      Age: 78 years      Sex: Female      Is Non- : No      Diabetic: No      Tobacco smoker: No      Systolic Blood Pressure: 608 mmHg      Is BP treated: No      HDL Cholesterol: 51 mg/dL      Total Cholesterol: 224 mg/dL        The following portions of the patient's history were reviewed and updated as appropriate: allergies, current medications, past family history, past medical history, past social history, past surgical history and problem list     Review of Systems   Constitutional: Negative for activity change, chills, fatigue and fever  HENT: Negative for congestion, ear pain and sore throat  Eyes: Negative for redness, itching and visual disturbance  Respiratory: Negative for cough, shortness of breath and wheezing  Cardiovascular: Negative for chest pain, palpitations and leg swelling     Gastrointestinal: Negative for abdominal pain, constipation, diarrhea and nausea  Genitourinary: Negative for hematuria  Musculoskeletal: Positive for arthralgias, back pain, gait problem and joint swelling  Negative for myalgias  Skin: Negative for rash and wound  Skin tags on face    Allergic/Immunologic: Negative for environmental allergies  Neurological: Negative for dizziness, light-headedness and headaches  Hematological: Does not bruise/bleed easily  Psychiatric/Behavioral: Negative for confusion and sleep disturbance  The patient is not nervous/anxious  Past Medical History:   Diagnosis Date    Anxiety     Last Assessed: [id number]    Arthritis     Last Assessed: [id number]    GERD (gastroesophageal reflux disease)     Last Assessed: [id number]    Hypothyroidism, adult 10/31/2017    Osteoporosis     Last Assessed: [id number]    Reflux esophagitis     Urinary incontinence     Last Assessed: 31Oct2017         Current Outpatient Medications:     aspirin 81 MG tablet, Take 81 mg by mouth daily  , Disp: , Rfl:     cholecalciferol (VITAMIN D3) 1,000 units tablet, Take 1,000 Units by mouth daily , Disp: , Rfl:     cyanocobalamin (VITAMIN B-12) 1,000 mcg tablet, Take 1,000 mcg by mouth daily, Disp: , Rfl:     diclofenac sodium (VOLTAREN) 1 %, Apply 4 g topically 2 (two) times a day as needed (knee pain), Disp: 1 Tube, Rfl: 1    esomeprazole (NexIUM) 20 mg capsule, Take 40 mg by mouth daily at bedtime  , Disp: , Rfl:     fexofenadine (ALLEGRA) 180 MG tablet, Take 1 tablet by mouth as needed , Disp: , Rfl:     fluticasone (FLONASE) 50 mcg/act nasal spray, 1 spray into each nostril daily, Disp: 1 Bottle, Rfl: 1    LORazepam (ATIVAN) 0 5 mg tablet, Take 1 tablet (0 5 mg total) by mouth daily as needed for anxiety, Disp: 30 tablet, Rfl: 0    multivitamin (THERAGRAN) TABS, Take 1 tablet by mouth daily, Disp: , Rfl:     naproxen sodium (ALEVE) 220 MG tablet, Take 220 mg by mouth daily as needed  , Disp: , Rfl:     nystatin (MYCOSTATIN) powder, Apply topically 3 (three) times a day, Disp: 56 7 g, Rfl: 1    DULoxetine (CYMBALTA) 30 mg delayed release capsule, TAKE 1 CAPSULE BY MOUTH EVERY DAY (Patient not taking: Reported on 7/2/2019), Disp: 30 capsule, Rfl: 0    ibuprofen (MOTRIN) 800 mg tablet, TAKE 1 TABLET BY MOUTH THREE TIMES A DAY WITH FOOD AS NEEDED, Disp: , Rfl: 1    Allergies   Allergen Reactions    Sulfa Antibiotics Rash and Edema       Social History   Past Surgical History:   Procedure Laterality Date    CHOLECYSTECTOMY      GALLBLADDER SURGERY      Last Assessed: [id number]    INCISIONAL BREAST BIOPSY      Last Assessed: [id number]   Yemi Hero KNEE SURGERY      Last Assessed: [id number]    [place] IMPLANT PERIPH/GASTRIC NEUROSTIM/ N/A 3/23/2016    Procedure:  REMOVAL IPG BATTERY ;  Surgeon: Rosalina Arce MD;  Location: [facility];  Service: UroGynecology           REPLACEMENT TOTAL KNEE Right 04/29/2019    ROOT CANAL  03/11/2019    SHOULDER SURGERY Left     SHOULDER SURGERY      Last Assessed: [id number]    TONSILLECTOMY      last Assessed: 31Oct2017    WISDOM TOOTH EXTRACTION      Last Assessed: 31Oct2017     Family History   Problem Relation Age of Onset    Lupus Mother     Other Mother         Cardiac Disorder    Other Father         Cardiac Disorder    Diabetes Father     Breast cancer Maternal Aunt     Breast cancer Cousin        Objective:  /58 (BP Location: Left arm, Patient Position: Sitting, Cuff Size: Standard)   Pulse 80   Temp 98 5 °F (36 9 °C) (Tympanic)   Ht 5' 2 25" (1 581 m) Comment: shoes on- recent knee surgery  Wt 79 4 kg (175 lb) Comment: shoes on- recent knee surgery  SpO2 98%   BMI 31 75 kg/m²        Physical Exam   Constitutional: She is oriented to person, place, and time  She appears well-developed and well-nourished  No distress  HENT:   Head: Normocephalic and atraumatic     Right Ear: External ear normal    Left Ear: External ear normal    Nose: Nose normal    Mouth/Throat: Oropharynx is clear and moist    Eyes: Pupils are equal, round, and reactive to light  Conjunctivae and EOM are normal    Flesh colored skin tags around upper eyelids b/l      Neck: Normal range of motion  Neck supple  Cardiovascular: Normal rate and regular rhythm  Pulmonary/Chest: Effort normal and breath sounds normal  She has no wheezes  Musculoskeletal: She exhibits edema (R knee edema, no redness, incision healed well )  Neurological: She is alert and oriented to person, place, and time  Skin: Skin is warm and dry  She is not diaphoretic  Multiple small pedunculated skin tags around upper eyelids    Psychiatric: She has a normal mood and affect  Her behavior is normal  Judgment and thought content normal    Vitals reviewed

## 2019-08-22 NOTE — PROGRESS NOTES
Assessment and Plan:     Problem List Items Addressed This Visit        Other    Hyperlipidemia      Other Visit Diagnoses     Encounter for Medicare annual wellness exam    -  Primary         History of Present Illness:     Patient presents for Medicare Annual Wellness visit    Patient Care Team:  Kota Parikh MD as PCP - General Robin Castillo MD (Gastroenterology)  Roel Espino MD (Ophthalmology)  Yessica Hurtado MD (Cardiology)  Karen Lassiter MD (Breast Surgery)  Danyell Marin MD (Obstetrics and Gynecology)  Juarez Zavaleta   (Dermatology)  John Giordano DO (Orthopedic Surgery)  Judy Rosales MD     Problem List:     Patient Active Problem List   Diagnosis    Lower abdominal pain    Abnormal LFTs    Allergic sinusitis    Arthritis of knee    Hyperlipidemia    Hypothyroidism, adult    Viral URI    Reflux esophagitis    Gastroesophageal reflux disease without esophagitis      Past Medical and Surgical History:     Past Medical History:   Diagnosis Date    Anxiety     Last Assessed: [id number]    Arthritis     Last Assessed: [id number]    GERD (gastroesophageal reflux disease)     Last Assessed: 31Oct2017    Hypothyroidism, adult 10/31/2017    Osteoporosis     Last Assessed: 31Oct2017    Reflux esophagitis     Urinary incontinence     Last Assessed: 31Oct2017     Past Surgical History:   Procedure Laterality Date    CHOLECYSTECTOMY      GALLBLADDER SURGERY      Last Assessed: [id number]    INCISIONAL BREAST BIOPSY      Last Assessed: [id number]   Lacy Osborne KNEE SURGERY      Last Assessed: [id number]    [place] IMPLANT PERIPH/GASTRIC NEUROSTIM/ N/A 3/23/2016    Procedure:  REMOVAL IPG BATTERY ;  Surgeon: Evelia Bourne MD;  Location: [facility];  Service: UroGynecology           REPLACEMENT TOTAL KNEE Right 04/29/2019    ROOT CANAL  03/11/2019    SHOULDER SURGERY Left     SHOULDER SURGERY      Last Assessed: [id number]    TONSILLECTOMY      last Assessed: [id number]  WISDOM TOOTH EXTRACTION      Last Assessed: [id number]      Family History:     Family History   Problem Relation Age of Onset    Lupus Mother     Other Mother         Cardiac Disorder    Other Father         Cardiac Disorder    Diabetes Father     Breast cancer Maternal Aunt     Breast cancer Cousin       Social History:     Social History     Tobacco Use   Smoking Status Never Smoker   Smokeless Tobacco Never Used     Social History     Substance and Sexual Activity   Alcohol Use Yes    Frequency: 2-4 times a month    Drinks per session: 1 or 2    Binge frequency: Never    Comment: Social - wine     Social History     Substance and Sexual Activity   Drug Use No      Medications and Allergies:     Current Outpatient Medications   Medication Sig Dispense Refill    aspirin 81 MG tablet Take 81 mg by mouth daily        cholecalciferol (VITAMIN D3) 1,000 units tablet Take 1,000 Units by mouth daily       cyanocobalamin (VITAMIN B-12) 1,000 mcg tablet Take 1,000 mcg by mouth daily      diclofenac sodium (VOLTAREN) 1 % Apply 4 g topically 2 (two) times a day as needed (knee pain) 1 Tube 1    esomeprazole (NexIUM) 20 mg capsule Take 40 mg by mouth daily at bedtime        fexofenadine (ALLEGRA) 180 MG tablet Take 1 tablet by mouth as needed       fluticasone (FLONASE) 50 mcg/act nasal spray 1 spray into each nostril daily 1 Bottle 1    LORazepam (ATIVAN) 0 5 mg tablet Take 1 tablet (0 5 mg total) by mouth daily as needed for anxiety 30 tablet 0    multivitamin (THERAGRAN) TABS Take 1 tablet by mouth daily      naproxen sodium (ALEVE) 220 MG tablet Take 220 mg by mouth daily as needed        nystatin (MYCOSTATIN) powder Apply topically 3 (three) times a day 56 7 g 1    DULoxetine (CYMBALTA) 30 mg delayed release capsule TAKE 1 CAPSULE BY MOUTH EVERY DAY (Patient not taking: Reported on 7/2/2019) 30 capsule 0    ibuprofen (MOTRIN) 800 mg tablet TAKE 1 TABLET BY MOUTH THREE TIMES A DAY WITH FOOD AS NEEDED  1     No current facility-administered medications for this visit  Allergies   Allergen Reactions    Sulfa Antibiotics Rash and Edema      Immunizations:     Immunization History   Administered Date(s) Administered    Influenza Split High Dose Preservative Free IM 10/31/2017    Influenza, high dose seasonal 0 5 mL 09/20/2018    Pneumococcal Conjugate 13-Valent 11/01/2018      Medicare Screening Tests and Risk Assessments:     Ana Laura Michelle is here for her Subsequent Wellness visit  Health Risk Assessment:  Patient rates overall health as very good  Patient feels that their physical health rating is Same  Eyesight was rated as Same  Hearing was rated as Same  Patient feels that their emotional and mental health rating is Same  Pain experienced by patient in the last 7 days has been A lot  Patient's pain rating has been 8/10  Patient states that she has experienced no weight loss or gain in last 6 months  Emotional/Mental Health:  Patient has been feeling nervous/anxious  PHQ-9 Depression Screening:    Frequency of the following problems over the past two weeks:      1  Little interest or pleasure in doing things: 0 - not at all      2  Feeling down, depressed, or hopeless: 0 - not at all  PHQ-2 Score: 0          Broken Bones/Falls: Fall Risk Assessment:    In the past year, patient has experienced: No history of falling in past year          Bladder/Bowel:  Patient has leaked urine accidently in the last six months  Patient reports no loss of bowel control  Immunizations:  Patient has had a flu vaccination within the last year  Patient has received a pneumonia shot  Patient has received a shingles shot  Patient has not received tetanus/diphtheria shot  Home Safety:  Patient does not have trouble with stairs inside or outside of their home  Patient currently reports that there are no safety hazards present in home, working smoke alarms, working carbon monoxide detectors  Preventative Screenings:   Breast cancer screening performed, no colon cancer screen completed, cholesterol screen completed, glaucoma eye exam completed,     Nutrition:  Current diet: Regular, Limited junk food and No Added Salt with servings of the following:    Medications:  Patient is currently taking over-the-counter supplements  List of OTC medications includes: mutivitamin, vit d, vit b  Patient is able to manage medications  Lifestyle Choices:  Patient reports no tobacco use  Patient has not smoked or used tobacco in the past   Patient reports alcohol use  Alcohol use per week: 1  Patient drives a vehicle  Patient wears seat belt  Current level of exercise of physical activity described by patient as: very active  Activities of Daily Living:  Can get out of bed by his or her self, able to dress self, able to make own meals, able to do own shopping, able to bathe self, can do own laundry/housekeeping, can manage own money, pay bills and track expenses    Previous Hospitalizations:  Hospitalization or ED visit in past 12 months  Number of hospitalizations within the last year: 1-2        Advanced Directives:  Patient has decided on a power of   Patient has spoken to designated power of   Patient has completed advanced directive          Preventative Screening/Counseling:      Cardiovascular:      General: Screening Current      Counseling: Improve Blood Pressure and Improve Exercise Tolerance      Comments: 8/2019 - total chol 224          Diabetes:      General: Screening Current      Counseling: Improve Physical Activity      Comments: 5/2019 FBS 89         Colorectal Cancer:      General: Screening Current      Comments: Pt reports she had one more recent than 7/2014 - 5 yr f/u recommended  We will need to get report         Breast Cancer:      General: Screening Current      Comments: mammo neg 1/3/19          Cervical Cancer:      General: Screening Current Comments: Dr Ok Curling         Osteoporosis:      General: Risks and Benefits Discussed      Counseling: Calcium and Vitamin D Intake and Regular Weightbearing Exercise          AAA:      General: Screening Not Indicated          Glaucoma:      General: Screening Current      Comments: Dr Edward Kenyon yearly         HIV:      General: Screening Not Indicated          Hepatitis C:      General: Screening Current      Additional Comments: Negative in 5/19     Advanced Directives:   Patient has living will for healthcare, has durable POA for healthcare, patient has an advanced directive  Information on ACP and/or AD provided  End of life assessment reviewed with patient  BMI Counseling: Body mass index is 31 75 kg/m²  Discussed the patient's BMI with her  The BMI is above average  BMI counseling and education was provided to the patient  Nutrition recommendations include reducing portion sizes and reducing fast food intake  Exercise recommendations include exercising 3-5 times per week

## 2019-08-22 NOTE — PATIENT INSTRUCTIONS
Obesity   AMBULATORY CARE:   Obesity  is when your body mass index (BMI) is greater than 30  Your healthcare provider will use your height and weight to measure your BMI  The risks of obesity include  many health problems, such as injuries or physical disability  You may need tests to check for the following:  · Diabetes     · High blood pressure or high cholesterol     · Heart disease     · Gallbladder or liver disease     · Cancer of the colon, breast, prostate, liver, or kidney     · Sleep apnea     · Arthritis or gout  Seek care immediately if:   · You have a severe headache, confusion, or difficulty speaking  · You have weakness on one side of your body  · You have chest pain, sweating, or shortness of breath  Contact your healthcare provider if:   · You have symptoms of gallbladder or liver disease, such as pain in your upper abdomen  · You have knee or hip pain and discomfort while walking  · You have symptoms of diabetes, such as intense hunger and thirst, and frequent urination  · You have symptoms of sleep apnea, such as snoring or daytime sleepiness  · You have questions or concerns about your condition or care  Treatment for obesity  focuses on helping you lose weight to improve your health  Even a small decrease in BMI can reduce the risk for many health problems  Your healthcare provider will help you set a weight-loss goal   · Lifestyle changes  are the first step in treating obesity  These include making healthy food choices and getting regular physical activity  Your healthcare provider may suggest a weight-loss program that involves coaching, education, and therapy  · Medicine  may help you lose weight when it is used with a healthy diet and physical activity  · Surgery  can help you lose weight if you are very obese and have other health problems  There are several types of weight-loss surgery  Ask your healthcare provider for more information    Be successful losing weight:   · Set small, realistic goals  An example of a small goal is to walk for 20 minutes 5 days a week  Anther goal is to lose 5% of your body weight  · Tell friends, family members, and coworkers about your goals  and ask for their support  Ask a friend to lose weight with you, or join a weight-loss support group  · Identify foods or triggers that may cause you to overeat , and find ways to avoid them  Remove tempting high-calorie foods from your home and workplace  Place a bowl of fresh fruit on your kitchen counter  If stress causes you to eat, then find other ways to cope with stress  · Keep a diary to track what you eat and drink  Also write down how many minutes of physical activity you do each day  Weigh yourself once a week and record it in your diary  Eating changes: You will need to eat 500 to 1,000 fewer calories each day than you currently eat to lose 1 to 2 pounds a week  The following changes will help you cut calories:  · Eat smaller portions  Use small plates, no larger than 9 inches in diameter  Fill your plate half full of fruits and vegetables  Measure your food using measuring cups until you know what a serving size looks like  · Eat 3 meals and 1 or 2 snacks each day  Plan your meals in advance  [facility] and eat at home most of the time  Eat slowly  · Eat fruits and vegetables at every meal   They are low in calories and high in fiber, which makes you feel full  Do not add butter, margarine, or cream sauce to vegetables  Use herbs to season steamed vegetables  · Eat less fat and fewer fried foods  Eat more baked or grilled chicken and fish  These protein sources are lower in calories and fat than red meat  Limit fast food  Dress your salads with olive oil and vinegar instead of bottled dressing  · Limit the amount of sugar you eat  Do not drink sugary beverages  Limit alcohol  Activity changes:  Physical activity is good for your body in many ways   It helps you burn calories and build strong muscles  It decreases stress and depression, and improves your mood  It can also help you sleep better  Talk to your healthcare provider before you begin an exercise program   · Exercise for at least 30 minutes 5 days a week  Start slowly  Set aside time each day for physical activity that you enjoy and that is convenient for you  It is best to do both weight training and an activity that increases your heart rate, such as walking, bicycling, or swimming  · Find ways to be more active  Do yard work and housecleaning  Walk up the stairs instead of using elevators  Spend your leisure time going to events that require walking, such as outdoor festivals or fairs  This extra physical activity can help you lose weight and keep it off  Follow up with your healthcare provider as directed: You may need to meet with a dietitian  Write down your questions so you remember to ask them during your visits  © 2017 [address] [employer] Information is for End User's use only and may not be sold, redistributed or otherwise used for commercial purposes  All illustrations and images included in CareNotes® are the copyrighted property of [employer]  or [employer]  The above information is an  only  It is not intended as medical advice for individual conditions or treatments  Talk to your doctor, nurse or pharmacist before following any medical regimen to see if it is safe and effective for you  Urinary Incontinence   WHAT YOU NEED TO KNOW:   What is urinary incontinence? Urinary incontinence (UI) is when you lose control of your bladder  What causes UI? UI occurs because your bladder cannot store or empty urine properly  The following are the most common types of UI:  · Stress incontinence  is when you leak urine due to increased bladder pressure  This may happen when you cough, sneeze, or exercise       · Urge incontinence  is when you feel the need to urinate right away and leak urine accidentally  · Mixed incontinence  is when you have both stress and urge UI  What are the signs and symptoms of UI?   · You feel like your bladder does not empty completely when you urinate  · You urinate often and need to urinate immediately  · You leak urine when you sleep, or you wake up with the urge to urinate  · You leak urine when you cough, sneeze, exercise, or laugh  How is UI diagnosed? Your healthcare provider will ask how often you leak urine and whether you have stress or urge symptoms  Tell him which medicines you take, how often you urinate, and how much liquid you drink each day  You may need any of the following tests:  · Urine tests  may show infection or kidney function  · A pelvic exam  may be done to check for blockages  A pelvic exam will also show if your bladder, uterus, or other organs have moved out of place  · An x-ray, ultrasound, or CT  may show problems with parts of your urinary system  You may be given contrast liquid to help your organs show up better in the pictures  Tell the healthcare provider if you have ever had an allergic reaction to contrast liquid  Do not enter the MRI room with anything metal  Metal can cause serious injury  Tell the healthcare provider if you have any metal in or on your body  · A bladder scan  will show how much urine is left in your bladder after you urinate  You will be asked to urinate and then healthcare providers will use a small ultrasound machine to check the urine left in your bladder  · Cystometry  is used to check the function of your urinary system  Your healthcare provider checks the pressure in your bladder while filling it with fluid  Your bladder pressure may also be tested when your bladder is full and while you urinate  How is UI treated? · Medicines  can help strengthen your bladder control      · Electrical stimulation  is used to send a small amount of electrical energy to your pelvic floor muscles  This helps control your bladder function  Electrodes may be placed outside your body or in your rectum  For women, the electrodes may be placed in the vagina  · A bulking agent  may be injected into the wall of your urethra to make it thicker  This helps keep your urethra closed and decreases urine leakage  · Devices  such as a clamp, pessary, or tampon may help stop urine leaks  Ask your healthcare provider for more information about these and other devices  · Surgery  may be needed if other treatments do not work  Several types of surgery can help improve your bladder control  Ask your healthcare provider for more information about the surgery you may need  How can I manage my symptoms? · Do pelvic muscle exercises often  Your pelvic muscles help you stop urinating  Squeeze these muscles tight for 5 seconds, then relax for 5 seconds  Gradually work up to squeezing for 10 seconds  Do 3 sets of 15 repetitions a day, or as directed  This will help strengthen your pelvic muscles and improve bladder control  · A catheter  may be used to help empty your bladder  A catheter is a tiny, plastic tube that is put into your bladder to drain your urine  Your healthcare provider may tell you to use a catheter to prevent your bladder from getting too full and leaking urine  · Keep a UI record  Write down how often you leak urine and how much you leak  Make a note of what you were doing when you leaked urine  · Train your bladder  Go to the bathroom at set times, such as every 2 hours, even if you do not feel the urge to go  You can also try to hold your urine when you feel the urge to go  For example, hold your urine for 5 minutes when you feel the urge to go  As that becomes easier, hold your urine for 10 minutes  · Drink liquids as directed  Ask your healthcare provider how much liquid to drink each day and which liquids are best for you   You may need to limit the amount of liquid you drink to help control your urine leakage  Limit or do not have drinks that contain caffeine or alcohol  Do not drink any liquid right before you go to bed  · Prevent constipation  Eat a variety of high-fiber foods  Good examples are high-fiber cereals, beans, vegetables, and whole-grain breads  Prune juice may help make your bowel movement softer  Walking is the best way to trigger your intestines to have a bowel movement  · Exercise regularly and maintain a healthy weight  Ask your healthcare provider how much you should weigh and about the best exercise plan for you  Weight loss and exercise will decrease pressure on your bladder and help you control your leakage  Ask him to help you create a weight loss plan if you are overweight  When should I seek immediate care? · You have severe pain  · You are confused or cannot think clearly  When should I contact my healthcare provider? · You have a fever  · You see blood in your urine  · You have pain when you urinate  · You have new or worse pain, even after treatment  · Your mouth feels dry or you have vision changes  · Your urine is cloudy or smells bad  · You have questions or concerns about your condition or care  CARE AGREEMENT:   You have the right to help plan your care  Learn about your health condition and how it may be treated  Discuss treatment options with your caregivers to decide what care you want to receive  You always have the right to refuse treatment  The above information is an  only  It is not intended as medical advice for individual conditions or treatments  Talk to your doctor, nurse or pharmacist before following any medical regimen to see if it is safe and effective for you  © 2017 [address] [employer] Information is for End User's use only and may not be sold, redistributed or otherwise used for commercial purposes   All illustrations and images included in CareNotes® are the copyrighted property of [employer]  or [employer]  Cigarette Smoking and Your Health   AMBULATORY CARE:   Risks to your health if you smoke:  Nicotine and other chemicals found in tobacco damage every cell in your body  Even if you are a light smoker, you have an increased risk for cancer, heart disease, and lung disease  If you are pregnant or have diabetes, smoking increases your risk for complications  Benefits to your health if you stop smoking:   · You decrease respiratory symptoms such as coughing, wheezing, and shortness of breath  · You reduce your risk for cancers of the lung, mouth, throat, kidney, bladder, pancreas, stomach, and cervix  If you already have cancer, you increase the benefits of chemotherapy  You also reduce your risk for cancer returning or a second cancer from developing  · You reduce your risk for heart disease, blood clots, heart attack, and stroke  · You reduce your risk for lung infections, and diseases such as pneumonia, asthma, chronic bronchitis, and emphysema  · Your circulation improves  More oxygen can be delivered to your body  If you have diabetes, you lower your risk for complications, such as kidney, artery, and eye diseases  You also lower your risk for nerve damage  Nerve damage can lead to amputations, poor vision, and blindness  · You improve your body's ability to heal and to fight infections  Benefits to the health of others if you stop smoking:  Tobacco is harmful to nonsmokers who breathe in your secondhand smoke  The following are ways the health of others around you may improve when you stop smoking:  · You lower the risks for lung cancer and heart disease in nonsmoking adults  · If you are pregnant, you lower the risk for miscarriage, early delivery, low birth weight, and stillbirth  You also lower your baby's risk for SIDS, obesity, developmental delay, and neurobehavioral problems, such as ADHD  · If you have children, you lower their risk for ear infections, colds, pneumonia, bronchitis, and asthma  For more information and support to stop smoking:   · Smokefree  gov  Phone: 5- 327 - 758-7140  Web Address: www smokefree  gov  Follow up with your healthcare provider as directed:  Write down your questions so you remember to ask them during your visits  © 2017 [address] [employer] Information is for End User's use only and may not be sold, redistributed or otherwise used for commercial purposes  All illustrations and images included in CareNotes® are the copyrighted property of [employer]  or [employer]  The above information is an  only  It is not intended as medical advice for individual conditions or treatments  Talk to your doctor, nurse or pharmacist before following any medical regimen to see if it is safe and effective for you  Fall Prevention   AMBULATORY CARE:   Fall prevention  includes ways to make your home and other areas safer  It also includes ways you can move more carefully to prevent a fall  Health conditions that cause changes in your blood pressure, vision, or muscle strength and coordination may increase your risk for falls  Medicines may also increase your risk for falls if they make you dizzy, weak, or sleepy  Call 911 or have someone else call if:   · You have fallen and are unconscious  · You have fallen and cannot move part of your body  Contact your healthcare provider if:   · You have fallen and have pain or a headache  · You have questions or concerns about your condition or care  Fall prevention tips:   · Stand or sit up slowly  This may help you keep your balance and prevent falls  · Use assistive devices as directed  Your healthcare provider may suggest that you use a cane or walker to help you keep your balance  You may need to have grab bars put in your bathroom near the toilet or in the shower      · Wear shoes that fit well and have soles that   Wear shoes both inside and outside  Use slippers with good   Do not wear shoes with high heels  · Wear a personal alarm  This is a device that allows you to call 911 if you fall and need help  Ask your healthcare provider for more information  · Stay active  Exercise can help strengthen your muscles and improve your balance  Your healthcare provider may recommend water aerobics or walking  He or she may also recommend physical therapy to improve your coordination  Never start an exercise program without talking to your healthcare provider first      · Manage your medical conditions  Keep all appointments with your healthcare providers  Visit your eye doctor as directed  Home safety tips:   · Add items to prevent falls in the bathroom  Put nonslip strips on your bath or shower floor to prevent you from slipping  Use a bath mat if you do not have carpet in the bathroom  This will prevent you from falling when you step out of the bath or shower  Use a shower seat so you do not need to stand while you shower  Sit on the toilet or a chair in your bathroom to dry yourself and put on clothing  This will prevent you from losing your balance from drying or dressing yourself while you are standing  · Keep paths clear  Remove books, shoes, and other objects from walkways and stairs  Place cords for telephones and lamps out of the way so that you do not need to walk over them  Tape them down if you cannot move them  Remove small rugs  If you cannot remove a rug, secure it with double-sided tape  This will prevent you from tripping  · Install bright lights in your home  Use night lights to help light paths to the bathroom or kitchen  Always turn on the light before you start walking  · Keep items you use often on shelves within reach  Do not use a step stool to help you reach an item  · Paint or place reflective tape on the edges of your stairs    This will help you see the stairs better  Follow up with your healthcare provider as directed:  Write down your questions so you remember to ask them during your visits  © 2017 [address] [employer] Information is for End User's use only and may not be sold, redistributed or otherwise used for commercial purposes  All illustrations and images included in CareNotes® are the copyrighted property of [employer]  or [employer]  The above information is an  only  It is not intended as medical advice for individual conditions or treatments  Talk to your doctor, nurse or pharmacist before following any medical regimen to see if it is safe and effective for you  Advance Directives   WHAT YOU NEED TO KNOW:   What are advance directives? Advance directives are legal documents that state your wishes and plans for medical care  These plans are made ahead of time in case you lose your ability to make decisions for yourself  Advance directives can apply to any medical decision, such as the treatments you want, and if you want to donate organs  What are the types of advance directives? There are many types of advance directives, and each state has rules about how to use them  You may choose a combination of any of the following:  · Living will: This is a written record of the treatment you want  You can also choose which treatments you do not want, which to limit, and which to stop at a certain time  This includes surgery, medicine, IV fluid, and tube feedings  · Durable power of  for healthcare [facility]): This is a written record that states who you want to make healthcare choices for you when you are unable to make them for yourself  This person, called a proxy, is usually a family member or a friend  You may choose more than 1 proxy  · Do not resuscitate (DNR) order:  A DNR order is used in case your heart stops beating or you stop breathing   It is a request not to have certain forms of treatment, such as CPR  A DNR order may be included in other types of advance directives  · Medical directive: This covers the care that you want if you are in a coma, near death, or unable to make decisions for yourself  You can list the treatments you want for each condition  Treatment may include pain medicine, surgery, blood transfusions, dialysis, IV or tube feedings, and a ventilator (breathing machine)  · Values history: This document has questions about your views, beliefs, and how you feel and think about life  This information can help others choose the care that you would choose  Why are advance directives important? An advance directive helps you control your care  Although spoken wishes may be used, it is better to have your wishes written down  Spoken wishes can be misunderstood, or not followed  Treatments may be given even if you do not want them  An advance directive may make it easier for your family to make difficult choices about your care  How do I decide what to put in my advance directives? · Make informed decisions:  Make sure you fully understand treatments or care you may receive  Think about the benefits and problems your decisions could cause for you or your family  Talk to healthcare providers if you have concerns or questions before you write down your wishes  You may also want to talk with your [employer] or , or a   Check your state laws to make sure that what you put in your advance directive is legal      · Sign all forms:  Sign and date your advance directive when you have finished  You may also need 2 witnesses to sign the forms  Witnesses cannot be your doctor or his staff, your spouse, heirs or beneficiaries, people you owe money to, or your chosen proxy  Talk to your family, proxy, and healthcare providers about your advance directive  Give each person a copy, and keep one for yourself in a place you can get to easily   Do not keep it hidden or locked away  · Review and revise your plans: You can revise your advance directive at any time, as long as you are able to make decisions  Review your plan every year, and when there are changes in your life, or your health  When you make changes, let your family, proxy, and healthcare providers know  Give each a new copy  Where can I find more information? · [employer]  [address] , [address]  Phone: 6- 147 - [phone]  Phone: 5- 268 - [phone]  Web Address: http://www  aafp org  · [address] [facility])  [address] [facility], [address] [facility] , [address] [place] [address]  Phone: [phone]  Phone: [phone]  Web Address: Trevor caldwell  CARE AGREEMENT:   You have the right to help plan your care  To help with this plan, you must learn about your health condition and treatment options  You must also learn about advance directives and how they are used  Work with your healthcare providers to decide what care will be used to treat you  You always have the right to refuse treatment  The above information is an  only  It is not intended as medical advice for individual conditions or treatments  Talk to your doctor, nurse or pharmacist before following any medical regimen to see if it is safe and effective for you  © 2017 [address] [employer] Information is for End User's use only and may not be sold, redistributed or otherwise used for commercial purposes  All illustrations and images included in CareNotes® are the copyrighted property of [employer]  or [employer]

## 2019-10-01 ENCOUNTER — OFFICE VISIT (OUTPATIENT)
Dept: INTERNAL MEDICINE CLINIC | Facility: CLINIC | Age: 69
End: 2019-10-01
Payer: MEDICARE

## 2019-10-01 ENCOUNTER — OFFICE VISIT (OUTPATIENT)
Dept: GASTROENTEROLOGY | Facility: AMBULARY SURGERY CENTER | Age: 69
End: 2019-10-01
Payer: MEDICARE

## 2019-10-01 VITALS
DIASTOLIC BLOOD PRESSURE: 76 MMHG | OXYGEN SATURATION: 97 % | HEART RATE: 69 BPM | SYSTOLIC BLOOD PRESSURE: 128 MMHG | BODY MASS INDEX: 32.65 KG/M2 | HEIGHT: 62 IN | TEMPERATURE: 98.4 F | WEIGHT: 177.4 LBS

## 2019-10-01 VITALS
HEART RATE: 63 BPM | WEIGHT: 176 LBS | RESPIRATION RATE: 18 BRPM | HEIGHT: 62 IN | TEMPERATURE: 98.2 F | DIASTOLIC BLOOD PRESSURE: 62 MMHG | BODY MASS INDEX: 32.39 KG/M2 | SYSTOLIC BLOOD PRESSURE: 116 MMHG

## 2019-10-01 DIAGNOSIS — R10.2 PELVIC PAIN: Primary | ICD-10-CM

## 2019-10-01 DIAGNOSIS — L03.032 PARONYCHIA OF TOENAIL, LEFT: ICD-10-CM

## 2019-10-01 DIAGNOSIS — K21.9 GASTROESOPHAGEAL REFLUX DISEASE WITHOUT ESOPHAGITIS: ICD-10-CM

## 2019-10-01 DIAGNOSIS — N30.90 CYSTITIS: ICD-10-CM

## 2019-10-01 LAB
SL AMB  POCT GLUCOSE, UA: NEGATIVE
SL AMB LEUKOCYTE ESTERASE,UA: ABNORMAL
SL AMB POCT BILIRUBIN,UA: NEGATIVE
SL AMB POCT BLOOD,UA: NEGATIVE
SL AMB POCT CLARITY,UA: ABNORMAL
SL AMB POCT COLOR,UA: YELLOW
SL AMB POCT KETONES,UA: NEGATIVE
SL AMB POCT NITRITE,UA: NEGATIVE
SL AMB POCT PH,UA: 6
SL AMB POCT SPECIFIC GRAVITY,UA: 1.02
SL AMB POCT URINE PROTEIN: NEGATIVE
SL AMB POCT UROBILINOGEN: 0.2

## 2019-10-01 PROCEDURE — 87086 URINE CULTURE/COLONY COUNT: CPT | Performed by: INTERNAL MEDICINE

## 2019-10-01 PROCEDURE — 99204 OFFICE O/P NEW MOD 45 MIN: CPT | Performed by: INTERNAL MEDICINE

## 2019-10-01 PROCEDURE — 99213 OFFICE O/P EST LOW 20 MIN: CPT | Performed by: INTERNAL MEDICINE

## 2019-10-01 PROCEDURE — 81003 URINALYSIS AUTO W/O SCOPE: CPT | Performed by: INTERNAL MEDICINE

## 2019-10-01 RX ORDER — PRENATAL VIT 91/IRON/FOLIC/DHA 28-975-200
COMBINATION PACKAGE (EA) ORAL 2 TIMES DAILY
Qty: 30 G | Refills: 0 | Status: SHIPPED | OUTPATIENT
Start: 2019-10-01

## 2019-10-01 RX ORDER — AMOXICILLIN 875 MG/1
875 TABLET, COATED ORAL 2 TIMES DAILY
Qty: 6 TABLET | Refills: 0 | Status: SHIPPED | OUTPATIENT
Start: 2019-10-01 | End: 2019-10-04

## 2019-10-01 RX ORDER — NYSTATIN 100000 U/G
CREAM TOPICAL
Refills: 2 | COMMUNITY
Start: 2019-09-20

## 2019-10-01 NOTE — ASSESSMENT & PLAN NOTE
Recommended warm soaks for 30 minutes followed by applications of Lamisil cream to the cuticle and nail bed of the great toenail and the 2nd toe

## 2019-10-01 NOTE — ASSESSMENT & PLAN NOTE
Symptoms of gagging at night appear to most likely from acid reflux  Also consider due to postnasal drip     -Patient was explained about the lifestyle and dietary modifications  Advised to avoid fatty foods, chocolates, caffeine, alcohol and any other triggering foods  Avoid eating for at least 3 hours before going to bed         -continue Nexium  Discussed about long-term side effects from Nexium and we can try high-dose Pepcid twice daily after the EGD    -schedule for EGD    -Patient was explained about  the risks and benefits of the procedure  Risks including but not limited to bleeding, infection, perforation were explained in detail  Also explained about less than 100% sensitivity with the exam and other alternatives

## 2019-10-01 NOTE — PROGRESS NOTES
Assessment/Plan:    Paronychia of toenail, left  Recommended warm soaks for 30 minutes followed by applications of Lamisil cream to the cuticle and nail bed of the great toenail and the 2nd toe  Cystitis  Amoxicillin 875 mg b i d  X3 days       Diagnoses and all orders for this visit:    Pelvic pain  -     POCT urine dip auto non-scope    Paronychia of toenail, left  -     amoxicillin (AMOXIL) 875 mg tablet; Take 1 tablet (875 mg total) by mouth 2 (two) times a day for 3 days  -     terbinafine (LamISIL) 1 % cream; Apply topically 2 (two) times a day    Cystitis  -     amoxicillin (AMOXIL) 875 mg tablet; Take 1 tablet (875 mg total) by mouth 2 (two) times a day for 3 days          Subjective:      Patient ID: New Holguin is a 78 y o  female  Patient presents to the office with complaints of a painful left great toe  She also complains of some bladder pressure  She was recently treated for a potential yeast infection when she complained of some vaginal itching and burning  She has history of urinary incontinence but does not note any worsening of her incontinence  She does complain of some bladder pressure like feeling  POC urinalysis is positive for leukocyte esterase but otherwise unremarkable        Family History   Problem Relation Age of Onset    Lupus Mother     Other Mother         Cardiac Disorder    Other Father         Cardiac Disorder    Diabetes Father     Breast cancer Maternal Aunt     Breast cancer Cousin      Social History     Socioeconomic History    Marital status:      Spouse name: Not on file    Number of children: Not on file    Years of education: Not on file    Highest education level: Not on file   Occupational History    Not on file   Social Needs    Financial resource strain: Not on file    Food insecurity:     Worry: Not on file     Inability: Not on file    Transportation needs:     Medical: Not on file     Non-medical: Not on file   Tobacco Use    Smoking status: Never Smoker    Smokeless tobacco: Never Used   Substance and Sexual Activity    Alcohol use: Yes     Frequency: 2-4 times a month     Drinks per session: 1 or 2     Binge frequency: Never     Comment: Social - wine    Drug use: No    Sexual activity: Not on file   Lifestyle    Physical activity:     Days per week: Not on file     Minutes per session: Not on file    Stress: Not on file   Relationships    Social connections:     Talks on phone: Not on file     Gets together: Not on file     Attends Taoist service: Not on file     Active member of club or organization: Not on file     Attends meetings of clubs or organizations: Not on file     Relationship status: Not on file    Intimate partner violence:     Fear of current or ex partner: Not on file     Emotionally abused: Not on file     Physically abused: Not on file     Forced sexual activity: Not on file   Other Topics Concern    Not on file   Social History Narrative    Not on file     Past Medical History:   Diagnosis Date    Anxiety     Last Assessed: [id number]    Arthritis     Last Assessed: [id number]    GERD (gastroesophageal reflux disease)     Last Assessed: 31Oct2017    Hypothyroidism, adult 10/31/2017    Osteoporosis     Last Assessed: 31Oct2017    Reflux esophagitis     Urinary incontinence     Last Assessed: 31Oct2017       Current Outpatient Medications:     aspirin 81 MG tablet, Take 81 mg by mouth daily  , Disp: , Rfl:     cholecalciferol (VITAMIN D3) 1,000 units tablet, Take 1,000 Units by mouth daily , Disp: , Rfl:     cyanocobalamin (VITAMIN B-12) 1,000 mcg tablet, Take 1,000 mcg by mouth daily, Disp: , Rfl:     diclofenac sodium (VOLTAREN) 1 %, Apply 4 g topically 2 (two) times a day as needed (knee pain), Disp: 1 Tube, Rfl: 1    esomeprazole (NexIUM) 20 mg capsule, Take 20 mg by mouth 2 (two) times a day , Disp: , Rfl:     fexofenadine (ALLEGRA) 180 MG tablet, Take 1 tablet by mouth as needed , Disp: , Rfl:     fluticasone (FLONASE) 50 mcg/act nasal spray, 1 spray into each nostril daily, Disp: 1 Bottle, Rfl: 1    ibuprofen (MOTRIN) 800 mg tablet, TAKE 1 TABLET BY MOUTH THREE TIMES A DAY WITH FOOD AS NEEDED, Disp: , Rfl: 1    LORazepam (ATIVAN) 0 5 mg tablet, Take 1 tablet (0 5 mg total) by mouth daily as needed for anxiety, Disp: 30 tablet, Rfl: 0    multivitamin (THERAGRAN) TABS, Take 1 tablet by mouth daily, Disp: , Rfl:     naproxen sodium (ALEVE) 220 MG tablet, Take 220 mg by mouth daily as needed  , Disp: , Rfl:     nystatin (MYCOSTATIN) cream, APPLY TO THE AFFECTED AREA(S) BY TOPICAL ROUTE 2 TIMES PER DAY FOR 2 WEEKS, Disp: , Rfl: 2    nystatin (MYCOSTATIN) powder, Apply topically 3 (three) times a day, Disp: 56 7 g, Rfl: 1    amoxicillin (AMOXIL) 875 mg tablet, Take 1 tablet (875 mg total) by mouth 2 (two) times a day for 3 days, Disp: 6 tablet, Rfl: 0    DULoxetine (CYMBALTA) 30 mg delayed release capsule, TAKE 1 CAPSULE BY MOUTH EVERY DAY (Patient not taking: Reported on 7/2/2019), Disp: 30 capsule, Rfl: 0    terbinafine (LamISIL) 1 % cream, Apply topically 2 (two) times a day, Disp: 30 g, Rfl: 0  Allergies   Allergen Reactions    Sulfa Antibiotics Rash and Edema     Past Surgical History:   Procedure Laterality Date    CHOLECYSTECTOMY      GALLBLADDER SURGERY      Last Assessed: [id number]    INCISIONAL BREAST BIOPSY      Last Assessed: [id number]   [facility] KNEE SURGERY      Last Assessed: [id number]    [place] IMPLANT PERIPH/GASTRIC NEUROSTIM/ N/A 3/23/2016    Procedure:  REMOVAL IPG BATTERY ;  Surgeon: Gia Ratliff MD;  Location: [facility];  Service: UroGynecology           REPLACEMENT TOTAL KNEE Right 04/29/2019    ROOT CANAL  03/11/2019    SHOULDER SURGERY Left     SHOULDER SURGERY      Last Assessed: [id number]    TONSILLECTOMY      last Assessed: [id number]    WISDOM TOOTH EXTRACTION      Last Assessed: [id number]         Review of Systems   Constitutional: Negative      HENT: Negative  Respiratory: Negative  Cardiovascular: Negative  Gastrointestinal: Negative  Genitourinary:        History of urinary incontinence   Musculoskeletal: Negative  Skin:        Irritation and erythema around the left great toenail consistent with paronychial infection  There is also some very mild paronychial involvement of the medial aspect of the 2nd left toe  Objective:      /76 (BP Location: Left arm, Patient Position: Sitting, Cuff Size: Standard)   Pulse 69   Temp 98 4 °F (36 9 °C) (Oral)   Ht 5' 2 28" (1 582 m)   Wt 80 5 kg (177 lb 6 4 oz)   SpO2 97%   BMI 32 15 kg/m²          Physical Exam   Constitutional: She is oriented to person, place, and time  She appears well-developed and well-nourished  No distress  HENT:   Head: Normocephalic and atraumatic  Eyes: Pupils are equal, round, and reactive to light  Conjunctivae are normal    Neck: No tracheal deviation present  Cardiovascular:   Hemodynamically stable   Pulmonary/Chest: Effort normal  No respiratory distress  Abdominal:   Benign   Musculoskeletal: She exhibits no edema  Neurological: She is alert and oriented to person, place, and time  Skin: Skin is warm and dry  She is not diaphoretic  There is erythema (Very mild erythema the distal aspect of the left great toe  Very small area of potential fluid collection on the lateral aspect of the great toenail)  Psychiatric: She has a normal mood and affect  Her behavior is normal    Vitals reviewed

## 2019-10-02 LAB — BACTERIA UR CULT: NORMAL

## 2019-11-04 ENCOUNTER — HOSPITAL ENCOUNTER (OUTPATIENT)
Dept: GASTROENTEROLOGY | Facility: AMBULARY SURGERY CENTER | Age: 69
Setting detail: OUTPATIENT SURGERY
Discharge: HOME | End: 2019-11-04
Attending: INTERNAL MEDICINE
Payer: MEDICARE

## 2019-11-04 ENCOUNTER — ANESTHESIA (OUTPATIENT)
Dept: GASTROENTEROLOGY | Facility: AMBULARY SURGERY CENTER | Age: 69
End: 2019-11-04

## 2019-11-04 ENCOUNTER — ANESTHESIA EVENT (OUTPATIENT)
Dept: GASTROENTEROLOGY | Facility: AMBULARY SURGERY CENTER | Age: 69
End: 2019-11-04

## 2019-11-04 VITALS
BODY MASS INDEX: 32.2 KG/M2 | HEART RATE: 56 BPM | TEMPERATURE: 97.8 F | SYSTOLIC BLOOD PRESSURE: 139 MMHG | DIASTOLIC BLOOD PRESSURE: 63 MMHG | HEIGHT: 62 IN | OXYGEN SATURATION: 99 % | WEIGHT: 175 LBS | RESPIRATION RATE: 18 BRPM

## 2019-11-04 DIAGNOSIS — K21.9 GASTROESOPHAGEAL REFLUX DISEASE WITHOUT ESOPHAGITIS: ICD-10-CM

## 2019-11-04 PROCEDURE — 88305 TISSUE EXAM BY PATHOLOGIST: CPT | Performed by: PATHOLOGY

## 2019-11-04 PROCEDURE — 1124F ACP DISCUSS-NO DSCNMKR DOCD: CPT | Performed by: INTERNAL MEDICINE

## 2019-11-04 PROCEDURE — 43251 EGD REMOVE LESION SNARE: CPT | Performed by: INTERNAL MEDICINE

## 2019-11-04 RX ORDER — FAMOTIDINE 20 MG/1
20 TABLET, FILM COATED ORAL 2 TIMES DAILY
Qty: 60 TABLET | Refills: 11 | Status: SHIPPED | OUTPATIENT
Start: 2019-11-04 | End: 2019-11-26

## 2019-11-04 RX ORDER — METOPROLOL TARTRATE 5 MG/5ML
INJECTION INTRAVENOUS AS NEEDED
Status: DISCONTINUED | OUTPATIENT
Start: 2019-11-04 | End: 2019-11-04 | Stop reason: SURG

## 2019-11-04 RX ORDER — LIDOCAINE HYDROCHLORIDE 10 MG/ML
INJECTION, SOLUTION INFILTRATION; PERINEURAL AS NEEDED
Status: DISCONTINUED | OUTPATIENT
Start: 2019-11-04 | End: 2019-11-04 | Stop reason: SURG

## 2019-11-04 RX ORDER — PROPOFOL 10 MG/ML
INJECTION, EMULSION INTRAVENOUS AS NEEDED
Status: DISCONTINUED | OUTPATIENT
Start: 2019-11-04 | End: 2019-11-04 | Stop reason: SURG

## 2019-11-04 RX ORDER — SODIUM CHLORIDE, SODIUM LACTATE, POTASSIUM CHLORIDE, CALCIUM CHLORIDE 600; 310; 30; 20 MG/100ML; MG/100ML; MG/100ML; MG/100ML
125 INJECTION, SOLUTION INTRAVENOUS CONTINUOUS
Status: DISCONTINUED | OUTPATIENT
Start: 2019-11-04 | End: 2019-11-08 | Stop reason: HOSPADM

## 2019-11-04 RX ADMIN — SODIUM CHLORIDE, SODIUM LACTATE, POTASSIUM CHLORIDE, AND CALCIUM CHLORIDE 125 ML/HR: .6; .31; .03; .02 INJECTION, SOLUTION INTRAVENOUS at 08:53

## 2019-11-04 RX ADMIN — PROPOFOL 25 MG: 10 INJECTION, EMULSION INTRAVENOUS at 09:20

## 2019-11-04 RX ADMIN — PROPOFOL 25 MG: 10 INJECTION, EMULSION INTRAVENOUS at 09:18

## 2019-11-04 RX ADMIN — PROPOFOL 25 MG: 10 INJECTION, EMULSION INTRAVENOUS at 09:22

## 2019-11-04 RX ADMIN — LIDOCAINE HYDROCHLORIDE 20 MG: 10 INJECTION, SOLUTION INFILTRATION; PERINEURAL at 09:17

## 2019-11-04 RX ADMIN — METOPROLOL TARTRATE 2.5 MG: 5 INJECTION, SOLUTION INTRAVENOUS at 09:20

## 2019-11-04 RX ADMIN — PROPOFOL 100 MG: 10 INJECTION, EMULSION INTRAVENOUS at 09:17

## 2019-11-04 NOTE — H&P
History and Physical - [facility]  Joshua Rendon 78 y o  female MRN: [id number]        HPI:  16-year-old female with history of GERD has been taking Nexium regularly  Denies any difficulty swallowing      Historical Information   Past Medical History:   Diagnosis Date    Anxiety     Last Assessed: [id number]    Arthritis     Last Assessed: [id number]    Colon polyp     GERD (gastroesophageal reflux disease)     Last Assessed: 31Oct2017    Hypothyroidism, adult 10/31/2017    Insomnia     Osteoporosis     Last Assessed: [id number]    Reflux esophagitis     Seasonal allergies     Urinary incontinence     Last Assessed: 31Oct2017     Past Surgical History:   Procedure Laterality Date    CHOLECYSTECTOMY      COLONOSCOPY      EXPLORATORY LAPAROTOMY      GALLBLADDER SURGERY      Last Assessed: [id number]    INCISIONAL BREAST BIOPSY      Last Assessed: [id number]   [facility] JOINT REPLACEMENT      KNEE SURGERY      Last Assessed: [id number]    [place] IMPLANT PERIPH/GASTRIC NEUROSTIM/ N/A 3/23/2016    Procedure:  REMOVAL IPG BATTERY ;  Surgeon: Lizbeth Snell MD;  [facility];  Service: UroGynecology           REPLACEMENT TOTAL KNEE Right 04/29/2019    ROOT CANAL  03/11/2019    SHOULDER SURGERY Left     SHOULDER SURGERY      Last Assessed: [id number]    TONSILLECTOMY      last Assessed: 31Oct2017    WISDOM TOOTH EXTRACTION      Last Assessed: [id number]     Social History   Social History     Substance and Sexual Activity   Alcohol Use Yes    Frequency: 2-4 times a month    Drinks per session: 1 or 2    Binge frequency: Never    Comment: Social - wine     Social History     Substance and Sexual Activity   Drug Use No     Social History     Tobacco Use   Smoking Status Never Smoker   Smokeless Tobacco Never Used     Family History   Problem Relation Age of Onset    Lupus Mother     Other Mother         Cardiac Disorder    Other Father         Cardiac Disorder    Diabetes Father  Breast cancer Maternal Aunt     Breast cancer Cousin        Meds/Allergies       (Not in a hospital admission)    Allergies   Allergen Reactions    Sulfa Antibiotics Rash and Edema       Objective     Blood pressure 132/70, pulse 66, temperature 98 4 °F (36 9 °C), temperature source Temporal, resp  rate 16, height 5' 2" (1 575 m), weight 79 4 kg (175 lb), SpO2 98 %      PHYSICAL EXAM:    Gen: NAD  CV: S1 & S2 normal, RRR  CHEST: Clear to auscultate  ABD: soft, NT/ND, good bowel sounds  EXT: no edema    ASSESSMENT:     GERD    PLAN:    EGD

## 2019-11-04 NOTE — ANESTHESIA PREPROCEDURE EVALUATION
Review of Systems/Medical History  Patient summary reviewed  Chart reviewed  No history of anesthetic complications     Cardiovascular  Hyperlipidemia,    Pulmonary  Negative pulmonary ROS        GI/Hepatic    GERD ,             Endo/Other  History of thyroid disease , hypothyroidism,      GYN       Hematology  Negative hematology ROS      Musculoskeletal    Arthritis     Neurology  Negative neurology ROS      Psychology   Anxiety,              Physical Exam    Airway    Mallampati score: II  TM Distance: >3 FB  Neck ROM: full     Dental       Cardiovascular      Pulmonary      Other Findings        Anesthesia Plan  ASA Score- 2     Anesthesia Type- IV sedation with anesthesia with ASA Monitors  Additional Monitors:   Airway Plan:         Plan Factors-    Induction- intravenous  Postoperative Plan-     Informed Consent- Anesthetic plan and risks discussed with patient  I personally reviewed this patient with the CRNA  Discussed and agreed on the Anesthesia Plan with the CRNA  Madeline Carter

## 2019-11-13 ENCOUNTER — TELEPHONE (OUTPATIENT)
Dept: GASTROENTEROLOGY | Facility: AMBULARY SURGERY CENTER | Age: 69
End: 2019-11-13

## 2019-11-13 NOTE — TELEPHONE ENCOUNTER
I spoke with pt she is aware of results    She trialed pepcid last week and had terrible gerd so she is back on nexium, no refills needed at this time, she is aware of long term side effects    Just an [facility]

## 2019-11-26 ENCOUNTER — OFFICE VISIT (OUTPATIENT)
Dept: INTERNAL MEDICINE CLINIC | Age: 69
End: 2019-11-26
Payer: MEDICARE

## 2019-11-26 VITALS
OXYGEN SATURATION: 97 % | SYSTOLIC BLOOD PRESSURE: 150 MMHG | TEMPERATURE: 99.4 F | DIASTOLIC BLOOD PRESSURE: 80 MMHG | BODY MASS INDEX: 31.71 KG/M2 | WEIGHT: 179 LBS | HEART RATE: 66 BPM | HEIGHT: 63 IN

## 2019-11-26 DIAGNOSIS — J30.9 ALLERGIC SINUSITIS: ICD-10-CM

## 2019-11-26 DIAGNOSIS — R07.89 CHEST TIGHTNESS: ICD-10-CM

## 2019-11-26 DIAGNOSIS — K21.9 GASTROESOPHAGEAL REFLUX DISEASE WITHOUT ESOPHAGITIS: ICD-10-CM

## 2019-11-26 DIAGNOSIS — Z23 NEED FOR INFLUENZA VACCINATION: Primary | ICD-10-CM

## 2019-11-26 DIAGNOSIS — R79.89 ABNORMAL LFTS: ICD-10-CM

## 2019-11-26 DIAGNOSIS — F41.1 GAD (GENERALIZED ANXIETY DISORDER): ICD-10-CM

## 2019-11-26 DIAGNOSIS — M17.10 ARTHRITIS OF KNEE: ICD-10-CM

## 2019-11-26 DIAGNOSIS — F40.243 FEAR OF FLYING: ICD-10-CM

## 2019-11-26 DIAGNOSIS — R12 HEARTBURN: ICD-10-CM

## 2019-11-26 DIAGNOSIS — K21.00 REFLUX ESOPHAGITIS: ICD-10-CM

## 2019-11-26 DIAGNOSIS — F41.9 ANXIETY: ICD-10-CM

## 2019-11-26 PROCEDURE — 99214 OFFICE O/P EST MOD 30 MIN: CPT | Performed by: INTERNAL MEDICINE

## 2019-11-26 PROCEDURE — 90662 IIV NO PRSV INCREASED AG IM: CPT

## 2019-11-26 PROCEDURE — G0008 ADMIN INFLUENZA VIRUS VAC: HCPCS

## 2019-11-26 PROCEDURE — 93000 ELECTROCARDIOGRAM COMPLETE: CPT | Performed by: INTERNAL MEDICINE

## 2019-11-26 RX ORDER — LORAZEPAM 0.5 MG/1
0.5 TABLET ORAL DAILY PRN
Qty: 30 TABLET | Refills: 0 | Status: SHIPPED | OUTPATIENT
Start: 2019-11-26 | End: 2020-02-06 | Stop reason: SDUPTHER

## 2019-11-26 NOTE — PATIENT INSTRUCTIONS
1  Take Pepcid complete or Gaviscon if needed  2  Limit the use of Aleve because of heartburn  3  Complete blood work prior to visit  4  Suggest using Lorazepam sparingly  5  Follow up with cardiologist    6  Recommend 10% weight loss  7  Recommend strict low fat diet  8  Continue minimizing processed foods and increase natural foods in diet  9  Minimize white starches, carbohydrates, and concentration sugars from diet  10  Eat smaller portions and decrease caloric intake  11  Continue to follow up with ortho for knee pain  12  Continue with physical therapy  13  Make appt with Chapito Alexander for wax  15  Consider NIH gov or [place] letter for more information  15  Follow up in 3 months or as needed

## 2019-11-26 NOTE — PROGRESS NOTES
Chief Complaint   Patient presents with    Follow-up    Heartburn    Anxiety    Fatigue       Assessment/Plan:    1  US of abdomen results from 7/17/19 - Pt has fatty liver  IMPRESSION:     Hepatic steatosis  Bilateral renal cysts  Status post post cholecystectomy  2  Heartburn - Pt states she has been experiencing chest tightness and pressure   She states she has been under a lot of stress  She is currently on Lorazepam and it seems to help but she woke up with the tightness again  She states she took a baby aspirin at that time  I will have her follow up with her cardiologist as soon as she can  I spoke with cardiologist for referral  I advised her to take Pepcid complete  POCT EKG was done today in the office  It was essentially normal  It had mild bradycardia and LFH which she has a Hx of  She will complete BMP prior to next visit  3  Anxiety - Pt states she has a lot of stress at work  She uses the Lorazepam as needed  4  Hyperlipidemia - Pts lipid panel from 8/7/19 revealed increased cholesterol at 224, increased LDL of 147, and elevated non-HDL cholesterol of 173  She was recommended to stay on a strict low fat and 10% weight loss  5  Chronic pain in knee - S/p knee replacement of right knee, she complains of pain  She should continue with PT  She should also follow up with her orthopedic surgeon for further treatment options  6  BMI - Pts BMI in the office today was 32 12  She was recommended the following: Continue minimizing processed foods and increase natural foods in diet  Minimize white starches, carbohydrates, and concentration sugars from diet  Eat smaller portions and decrease caloric intake  7  Health Maintenance - Pt is due for her PPSV23 and Tdap  Follow up in 3 months or as needed  BMI Counseling: Body mass index is 32 12 kg/m²   The BMI is above normal  Nutrition recommendations include decreasing portion sizes, encouraging healthy choices of fruits and vegetables, decreasing fast food intake, consuming healthier snacks, limiting drinks that contain sugar, moderation in carbohydrate intake and reducing intake of cholesterol  No pharmacotherapy was ordered  I have spent 30 minutes with Patient  today in which greater than 50% of this time was spent in counseling/coordination of care regarding Intructions for management, Patient and family education and Importance of tx compliance  Diagnoses and all orders for this visit:    Need for influenza vaccination  -     influenza vaccine, 6980-7093, high-dose, PF 0 5 mL (FLUZONE HIGH-DOSE)    Anxiety  -     LORazepam (ATIVAN) 0 5 mg tablet; Take 1 tablet (0 5 mg total) by mouth daily as needed for anxiety    Fear of flying  -     LORazepam (ATIVAN) 0 5 mg tablet; Take 1 tablet (0 5 mg total) by mouth daily as needed for anxiety          Subjective:      Patient ID: Ghada Funez is a 78 y o  female  This is the case of a 78year old female with a PMHx of hyperlipidemia, arthritis, and hypothyroidism presenting in the office today for a follow up  She complains of heartburn, anxiety, and fatigue   Pt states she has been experiencing "chest tightness and pressure " She states she has been under a lot of stress and is having sleep disturbance  She is currently on Lorazepam and it seems to help but she woke up with the tightness again  She states she took a baby aspirin at that time  Pt states she continues to have incontinence  Pt states she has joint pain in her hands and fingers due arthritic changes  Lab results were reviewed and discussed with patient  The following portions of the patient's history were reviewed and updated as appropriate: allergies, current medications, past family history, past medical history, past social history, past surgical history and problem list     Review of Systems   Constitutional: Positive for diaphoresis   Negative for appetite change, chills, fatigue, fever and unexpected weight change  HENT: Negative for congestion, drooling, ear discharge, facial swelling, hearing loss, nosebleeds, postnasal drip, rhinorrhea, sinus pressure, sinus pain and sneezing  Respiratory: Positive for chest tightness (releaved with lorazepam)  Negative for cough, choking, shortness of breath, wheezing and stridor  Cardiovascular: Positive for chest pain  Negative for palpitations and leg swelling  Genitourinary: Positive for urgency (urge incontince)  Negative for difficulty urinating and frequency  Musculoskeletal: Positive for arthralgias (arthritic changes hand and fingers)  Negative for back pain, gait problem, myalgias and neck pain  Knee swelling   Skin: Negative  Neurological: Negative for dizziness, tremors, seizures, speech difficulty, light-headedness, numbness and headaches  Psychiatric/Behavioral: Positive for sleep disturbance  Negative for confusion  Allergies   Allergen Reactions    Sulfa Antibiotics Rash and Edema     Past Medical History:   Diagnosis Date    Anxiety     Last Assessed: [id number]    Arthritis     Last Assessed: [id number]    Colon polyp     GERD (gastroesophageal reflux disease)     Last Assessed: 31Oct2017    Hypothyroidism, adult 10/31/2017    Insomnia     Osteoporosis     Last Assessed: [id number]    Reflux esophagitis     Seasonal allergies     Urinary incontinence     Last Assessed: 31Oct2017     Current Outpatient Medications on File Prior to Visit   Medication Sig Dispense Refill    aspirin 81 MG tablet Take 81 mg by mouth daily        cholecalciferol (VITAMIN D3) 1,000 units tablet Take 1,000 Units by mouth daily       cyanocobalamin (VITAMIN B-12) 1,000 mcg tablet Take 1,000 mcg by mouth daily      diclofenac sodium (VOLTAREN) 1 % Apply 4 g topically 2 (two) times a day as needed (knee pain) 1 Tube 1    esomeprazole (NexIUM) 20 mg capsule Take 20 mg by mouth 2 (two) times a day       fexofenadine (ALLEGRA) 180 MG tablet Take 1 tablet by mouth as needed       fluticasone (FLONASE) 50 mcg/act nasal spray 1 spray into each nostril daily 1 Bottle 1    ibuprofen (MOTRIN) 800 mg tablet TAKE 1 TABLET BY MOUTH THREE TIMES A DAY WITH FOOD AS NEEDED  1    naproxen sodium (ALEVE) 220 MG tablet Take 220 mg by mouth daily as needed        nystatin (MYCOSTATIN) cream APPLY TO THE AFFECTED AREA(S) BY TOPICAL ROUTE 2 TIMES PER DAY FOR 2 WEEKS  2    nystatin (MYCOSTATIN) powder Apply topically 3 (three) times a day 56 7 g 1    [DISCONTINUED] LORazepam (ATIVAN) 0 5 mg tablet Take 1 tablet (0 5 mg total) by mouth daily as needed for anxiety 30 tablet 0    terbinafine (LamISIL) 1 % cream Apply topically 2 (two) times a day (Patient not taking: Reported on 11/26/2019) 30 g 0    [DISCONTINUED] famotidine (PEPCID) 20 mg tablet Take 1 tablet (20 mg total) by mouth 2 (two) times a day 60 tablet 11     No current facility-administered medications on file prior to visit        Past Surgical History:   Procedure Laterality Date    CHOLECYSTECTOMY      COLONOSCOPY      EXPLORATORY LAPAROTOMY      GALLBLADDER SURGERY      Last Assessed: [id number]    INCISIONAL BREAST BIOPSY      Last Assessed: [id number]   [facility]      KNEE SURGERY      Last Assessed: [id number]    [place] IMPLANT PERIPH/GASTRIC NEUROSTIM/ N/A 3/23/2016    Procedure:  REMOVAL IPG BATTERY ;  Surgeon: Sánchez Angulo MD;  [facility];  Service: UroGynecology           REPLACEMENT TOTAL KNEE Right 04/29/2019    ROOT CANAL  03/11/2019    SHOULDER SURGERY Left     SHOULDER SURGERY      Last Assessed: [id number]    TONSILLECTOMY      last Assessed: 31Oct2017    WISDOM TOOTH EXTRACTION      Last Assessed: [id number]     Social History     Socioeconomic History    Marital status:      Spouse name: Not on file    Number of children: Not on file    Years of education: Not on file    Highest education level: Not on file Occupational History    Not on file   Social Needs    Financial resource strain: Not on file    Food insecurity:     Worry: Not on file     Inability: Not on file    Transportation needs:     Medical: Not on file     Non-medical: Not on file   Tobacco Use    Smoking status: Never Smoker    Smokeless tobacco: Never Used   Substance and Sexual Activity    Alcohol use: Yes     Frequency: 2-4 times a month     Drinks per session: 1 or 2     Binge frequency: Never     Comment: Social - wine    Drug use: No    Sexual activity: Yes   Lifestyle    Physical activity:     Days per week: Not on file     Minutes per session: Not on file    Stress: Not on file   Relationships    Social connections:     Talks on phone: Not on file     Gets together: Not on file     Attends Holiness service: Not on file     Active member of club or organization: Not on file     Attends meetings of clubs or organizations: Not on file     Relationship status: Not on file    Intimate partner violence:     Fear of current or ex partner: Not on file     Emotionally abused: Not on file     Physically abused: Not on file     Forced sexual activity: Not on file   Other Topics Concern    Not on file   Social History Narrative    Not on file     Objective:      /80 (BP Location: Left arm, Patient Position: Sitting, Cuff Size: Adult)   Pulse 66   Temp 99 4 °F (37 4 °C) (Tympanic)   Ht 5' 2 6" (1 59 m)   Wt 81 2 kg (179 lb)   SpO2 97%   BMI 32 12 kg/m²          Physical Exam   Constitutional: She is oriented to person, place, and time  She appears well-developed and well-nourished  No distress  HENT:   Head: Normocephalic and atraumatic  Mouth/Throat: No oropharyngeal exudate  Eyes: EOM are normal  Right eye exhibits no discharge  Left eye exhibits no discharge  No scleral icterus  Neck: Normal range of motion  Neck supple  No JVD present  No tracheal deviation present  No thyromegaly present     Cardiovascular: Normal rate and regular rhythm  Exam reveals no friction rub  No murmur heard  Pulmonary/Chest: Effort normal and breath sounds normal  No respiratory distress  She has no wheezes  She has no rales  Abdominal: Soft  Bowel sounds are normal  She exhibits no distension and no mass  There is no tenderness  There is no guarding  Musculoskeletal: Normal range of motion  She exhibits no edema, tenderness or deformity  Hand swollen, rt  Knee swelling   Neurological: She is alert and oriented to person, place, and time  She displays normal reflexes  No cranial nerve deficit  Skin: Skin is warm and dry  No rash noted  She is not diaphoretic  No erythema  No pallor  Psychiatric: She has a normal mood and affect  Her behavior is normal  Judgment and thought content normal          Attestation:   By signing my name below, Hilda Lima, attest that this documentation has been prepared under the direction and in the presence of Claudia Greene MD  Electronically Signed: Karen Gatica  11/26/19      I, Claudia Greene, personally performed the services described in this documentation  All medical record entries made by the scribe were at my direction and in my presence  I have reviewed the chart and discharge instructions and agree that the record reflects my personal performance and is accurate and complete   Claudia Greene MD  11/26/19

## 2019-12-11 ENCOUNTER — OFFICE VISIT (OUTPATIENT)
Dept: INTERNAL MEDICINE CLINIC | Age: 69
End: 2019-12-11
Payer: MEDICARE

## 2019-12-11 VITALS
WEIGHT: 178.6 LBS | TEMPERATURE: 97.3 F | DIASTOLIC BLOOD PRESSURE: 80 MMHG | BODY MASS INDEX: 31.64 KG/M2 | HEART RATE: 66 BPM | HEIGHT: 63 IN | OXYGEN SATURATION: 98 % | SYSTOLIC BLOOD PRESSURE: 150 MMHG

## 2019-12-11 DIAGNOSIS — H61.23 BILATERAL IMPACTED CERUMEN: Primary | ICD-10-CM

## 2019-12-11 DIAGNOSIS — Z23 NEED FOR PNEUMOCOCCAL VACCINATION: ICD-10-CM

## 2019-12-11 PROCEDURE — 69210 REMOVE IMPACTED EAR WAX UNI: CPT | Performed by: PHYSICIAN ASSISTANT

## 2019-12-11 PROCEDURE — G0009 ADMIN PNEUMOCOCCAL VACCINE: HCPCS | Performed by: PHYSICIAN ASSISTANT

## 2019-12-11 PROCEDURE — 90732 PPSV23 VACC 2 YRS+ SUBQ/IM: CPT | Performed by: PHYSICIAN ASSISTANT

## 2019-12-11 NOTE — PROGRESS NOTES
Assessment/Plan:         Diagnoses and all orders for this visit:    Bilateral impacted cerumen  Comments:  successful removal - f/u as needed     Need for pneumococcal vaccination  -     PNEUMOCOCCAL POLYSACCHARIDE VACCINE 23-VALENT =>1YO SQ IM    Other orders  -     Ear cerumen removal          Subjective:      Patient ID: Oksana Robbins is a 78 y o  female  79 y/o female presents for cerumen impaction   Pt has fullness sensation   Denies change in hearing     [facility]   Ordered by cardio (dr Maynor Buck)           The following portions of the patient's history were reviewed and updated as appropriate: allergies, current medications, past family history, past medical history, past social history, past surgical history and problem list     Review of Systems   Constitutional: Negative for activity change, appetite change, chills, fatigue and fever  HENT: Positive for ear discharge  Negative for hearing loss and postnasal drip  Respiratory: Negative for cough and shortness of breath  Cardiovascular: Negative for chest pain  Skin: Negative for rash  Neurological: Negative for dizziness, light-headedness and headaches  Psychiatric/Behavioral: Negative for sleep disturbance  The patient is not nervous/anxious  Past Medical History:   Diagnosis Date    Anxiety     Last Assessed: [id number]    Arthritis     Last Assessed: [id number]    Colon polyp     GERD (gastroesophageal reflux disease)     Last Assessed: 31Oct2017    Hypothyroidism, adult 10/31/2017    Insomnia     Osteoporosis     Last Assessed: [id number]    Reflux esophagitis     Seasonal allergies     Urinary incontinence     Last Assessed: 31Oct2017         Current Outpatient Medications:     aspirin 81 MG tablet, Take 81 mg by mouth daily  , Disp: , Rfl:     cholecalciferol (VITAMIN D3) 1,000 units tablet, Take 1,000 Units by mouth daily , Disp: , Rfl:     cyanocobalamin (VITAMIN B-12) 1,000 mcg tablet, Take 1,000 mcg by mouth daily, Disp: , Rfl:     diclofenac sodium (VOLTAREN) 1 %, Apply 4 g topically 2 (two) times a day as needed (knee pain), Disp: 1 Tube, Rfl: 1    esomeprazole (NexIUM) 20 mg capsule, Take 20 mg by mouth 2 (two) times a day , Disp: , Rfl:     fexofenadine (ALLEGRA) 180 MG tablet, Take 1 tablet by mouth as needed , Disp: , Rfl:     fluticasone (FLONASE) 50 mcg/act nasal spray, 1 spray into each nostril daily, Disp: 1 Bottle, Rfl: 1    ibuprofen (MOTRIN) 800 mg tablet, TAKE 1 TABLET BY MOUTH THREE TIMES A DAY WITH FOOD AS NEEDED, Disp: , Rfl: 1    LORazepam (ATIVAN) 0 5 mg tablet, Take 1 tablet (0 5 mg total) by mouth daily as needed for anxiety, Disp: 30 tablet, Rfl: 0    naproxen sodium (ALEVE) 220 MG tablet, Take 220 mg by mouth daily as needed  , Disp: , Rfl:     nystatin (MYCOSTATIN) cream, APPLY TO THE AFFECTED AREA(S) BY TOPICAL ROUTE 2 TIMES PER DAY FOR 2 WEEKS, Disp: , Rfl: 2    nystatin (MYCOSTATIN) powder, Apply topically 3 (three) times a day, Disp: 56 7 g, Rfl: 1    terbinafine (LamISIL) 1 % cream, Apply topically 2 (two) times a day (Patient not taking: Reported on 11/26/2019), Disp: 30 g, Rfl: 0    Allergies   Allergen Reactions    Fluticasone     Sulfa Antibiotics Rash and Edema       Social History   Past Surgical History:   Procedure Laterality Date    CHOLECYSTECTOMY      COLONOSCOPY      EXPLORATORY LAPAROTOMY      GALLBLADDER SURGERY      Last Assessed: [id number]    INCISIONAL BREAST BIOPSY      Last Assessed: [id number]   [facility] JOINT REPLACEMENT      KNEE SURGERY      Last Assessed: [id number]    MS IMPLANT PERIPH/GASTRIC NEUROSTIM/ N/A 3/23/2016    Procedure:  REMOVAL IPG BATTERY ;  Surgeon: Simon Limon MD;  Location[facility];  Service: UroGynecology           REPLACEMENT TOTAL KNEE Right 04/29/2019    ROOT CANAL  03/11/2019    SHOULDER SURGERY Left     SHOULDER SURGERY      Last Assessed: [id number]    TONSILLECTOMY      last Assessed: \[facility]\"" 36 EXTRACTION      Last Assessed: 31Oct2017     Family History   Problem Relation Age of Onset    Lupus Mother     Other Mother         Cardiac Disorder    Other Father         Cardiac Disorder    Diabetes Father     Breast cancer Maternal Aunt     Breast cancer Cousin        Objective:  /80 (BP Location: Left arm, Patient Position: Sitting, Cuff Size: Adult)   Pulse 66   Temp (!) 97 3 °F (36 3 °C) (Tympanic)   Ht 5' 2 6" (1 59 m)   Wt 81 kg (178 lb 9 6 oz)   SpO2 98%   BMI 32 04 kg/m²          Physical Exam   Constitutional: She appears well-developed and well-nourished  No distress  HENT:   Head: Normocephalic and atraumatic  Right Ear: External ear normal    Left Ear: External ear normal    Mouth/Throat: Oropharynx is clear and moist  No oropharyngeal exudate  B/l cerumen impaction    Eyes: Pupils are equal, round, and reactive to light  EOM are normal    Cardiovascular: Normal rate, regular rhythm and normal heart sounds  Pulmonary/Chest: Effort normal and breath sounds normal  She has no wheezes  She has no rales  Skin: Skin is warm and dry  She is not diaphoretic  No erythema  Psychiatric: She has a normal mood and affect  Her behavior is normal  Judgment and thought content normal    Vitals reviewed      Ear cerumen removal  Date/Time: 12/11/2019 3:55 PM  Performed by: Martina Rivera PA-C  Authorized by: Martina Rivera PA-C     Patient location:  Clinic  Consent:     Consent obtained:  Verbal    Consent given by:  Patient    Risks discussed:  Bleeding, dizziness, incomplete removal and pain    Alternatives discussed:  No treatment  Universal protocol:     Patient identity confirmed:  Verbally with patient  Procedure details:     Local anesthetic:  None    Location:  L ear and R ear    Procedure type: curette      Approach:  External  Post-procedure details:     Complication:  None    Hearing quality:  Normal    Patient tolerance of procedure: Tolerated well, no immediate complications

## 2019-12-20 ENCOUNTER — TELEPHONE (OUTPATIENT)
Dept: INTERNAL MEDICINE CLINIC | Age: 69
End: 2019-12-20

## 2019-12-20 ENCOUNTER — DOCUMENTATION (OUTPATIENT)
Dept: INTERNAL MEDICINE CLINIC | Facility: CLINIC | Age: 69
End: 2019-12-20

## 2019-12-20 RX ORDER — CHLORAL HYDRATE 500 MG
1000 CAPSULE ORAL DAILY
COMMUNITY
End: 2020-08-20 | Stop reason: ALTCHOICE

## 2019-12-20 NOTE — TELEPHONE ENCOUNTER
----- Message from Carlos Amaral RN sent at 12/17/2019  6:46 AM EST -----  12/11/19 - Lab Results - CC results in basket
Patient

## 2019-12-29 ENCOUNTER — TELEPHONE (OUTPATIENT)
Dept: INTERNAL MEDICINE CLINIC | Age: 69
End: 2019-12-29

## 2019-12-29 NOTE — TELEPHONE ENCOUNTER
----- Message from Liban Nunez RN sent at 12/26/2019 10:07 AM EST -----  12/24/19 - Stress Test - CC results in basket

## 2020-02-06 DIAGNOSIS — F41.9 ANXIETY: ICD-10-CM

## 2020-02-06 DIAGNOSIS — F40.243 FEAR OF FLYING: ICD-10-CM

## 2020-02-06 RX ORDER — LORAZEPAM 0.5 MG/1
0.5 TABLET ORAL DAILY PRN
Qty: 30 TABLET | Refills: 0 | Status: SHIPPED | OUTPATIENT
Start: 2020-02-06 | End: 2020-07-01 | Stop reason: SDUPTHER

## 2020-02-06 NOTE — TELEPHONE ENCOUNTER
Pt is requesting refill lorazepam  Concepcion has one pill left   Last OV:12/11/19  Future OV:02/26/20  Walker not in in office    PDMP completed

## 2020-02-25 LAB
BUN SERPL-MCNC: 21 MG/DL (ref 7–25)
BUN/CREAT SERPL: NORMAL (CALC) (ref 6–22)
CALCIUM SERPL-MCNC: 9.3 MG/DL (ref 8.6–10.4)
CHLORIDE SERPL-SCNC: 107 MMOL/L (ref 98–110)
CO2 SERPL-SCNC: 25 MMOL/L (ref 20–32)
CREAT SERPL-MCNC: 0.74 MG/DL (ref 0.6–0.93)
GLUCOSE SERPL-MCNC: 92 MG/DL (ref 65–99)
POTASSIUM SERPL-SCNC: 4.3 MMOL/L (ref 3.5–5.3)
SL AMB EGFR AFRICAN AMERICAN: 95 ML/MIN/1.73M2
SL AMB EGFR NON AFRICAN AMERICAN: 82 ML/MIN/1.73M2
SODIUM SERPL-SCNC: 139 MMOL/L (ref 135–146)

## 2020-03-03 ENCOUNTER — HOSPITAL ENCOUNTER (EMERGENCY)
Facility: HOSPITAL | Age: 70
Discharge: HOME | End: 2020-03-03
Attending: EMERGENCY MEDICINE
Payer: MEDICARE

## 2020-03-03 VITALS
DIASTOLIC BLOOD PRESSURE: 79 MMHG | HEART RATE: 93 BPM | TEMPERATURE: 98.7 F | WEIGHT: 181.22 LBS | OXYGEN SATURATION: 96 % | BODY MASS INDEX: 32.51 KG/M2 | RESPIRATION RATE: 16 BRPM | SYSTOLIC BLOOD PRESSURE: 168 MMHG

## 2020-03-03 DIAGNOSIS — R11.2 NAUSEA AND VOMITING, INTRACTABILITY OF VOMITING NOT SPECIFIED, UNSPECIFIED VOMITING TYPE: Primary | ICD-10-CM

## 2020-03-03 LAB
ANION GAP SERPL CALCULATED.3IONS-SCNC: 14 MMOL/L (ref 4–13)
BASOPHILS # BLD AUTO: 0.02 THOUSANDS/ΜL (ref 0–0.1)
BASOPHILS NFR BLD AUTO: 0 % (ref 0–1)
BUN SERPL-MCNC: 23 MG/DL (ref 5–25)
CALCIUM SERPL-MCNC: 8.8 MG/DL (ref 8.3–10.1)
CHLORIDE SERPL-SCNC: 105 MMOL/L (ref 100–108)
CO2 SERPL-SCNC: 24 MMOL/L (ref 21–32)
CREAT SERPL-MCNC: 0.9 MG/DL (ref 0.6–1.3)
EOSINOPHIL # BLD AUTO: 0.02 THOUSAND/ΜL (ref 0–0.61)
EOSINOPHIL NFR BLD AUTO: 0 % (ref 0–6)
ERYTHROCYTE [DISTWIDTH] IN BLOOD BY AUTOMATED COUNT: 14.1 % (ref 11.6–15.1)
GFR SERPL CREATININE-BSD FRML MDRD: 65 ML/MIN/1.73SQ M
GLUCOSE SERPL-MCNC: 138 MG/DL (ref 65–140)
HCT VFR BLD AUTO: 44.7 % (ref 34.8–46.1)
HGB BLD-MCNC: 14.3 G/DL (ref 11.5–15.4)
IMM GRANULOCYTES # BLD AUTO: 0.05 THOUSAND/UL (ref 0–0.2)
IMM GRANULOCYTES NFR BLD AUTO: 1 % (ref 0–2)
LYMPHOCYTES # BLD AUTO: 0.31 THOUSANDS/ΜL (ref 0.6–4.47)
LYMPHOCYTES NFR BLD AUTO: 3 % (ref 14–44)
MCH RBC QN AUTO: 26.1 PG (ref 26.8–34.3)
MCHC RBC AUTO-ENTMCNC: 32 G/DL (ref 31.4–37.4)
MCV RBC AUTO: 82 FL (ref 82–98)
MONOCYTES # BLD AUTO: 0.24 THOUSAND/ΜL (ref 0.17–1.22)
MONOCYTES NFR BLD AUTO: 2 % (ref 4–12)
NEUTROPHILS # BLD AUTO: 10.38 THOUSANDS/ΜL (ref 1.85–7.62)
NEUTS SEG NFR BLD AUTO: 94 % (ref 43–75)
NRBC BLD AUTO-RTO: 0 /100 WBCS
PLATELET # BLD AUTO: 295 THOUSANDS/UL (ref 149–390)
PMV BLD AUTO: 10.9 FL (ref 8.9–12.7)
POTASSIUM SERPL-SCNC: 3.6 MMOL/L (ref 3.5–5.3)
RBC # BLD AUTO: 5.47 MILLION/UL (ref 3.81–5.12)
SODIUM SERPL-SCNC: 143 MMOL/L (ref 136–145)
WBC # BLD AUTO: 11.02 THOUSAND/UL (ref 4.31–10.16)

## 2020-03-03 PROCEDURE — 96361 HYDRATE IV INFUSION ADD-ON: CPT

## 2020-03-03 PROCEDURE — 36415 COLL VENOUS BLD VENIPUNCTURE: CPT | Performed by: EMERGENCY MEDICINE

## 2020-03-03 PROCEDURE — 96374 THER/PROPH/DIAG INJ IV PUSH: CPT

## 2020-03-03 PROCEDURE — 80048 BASIC METABOLIC PNL TOTAL CA: CPT | Performed by: EMERGENCY MEDICINE

## 2020-03-03 PROCEDURE — 99283 EMERGENCY DEPT VISIT LOW MDM: CPT

## 2020-03-03 PROCEDURE — 99284 EMERGENCY DEPT VISIT MOD MDM: CPT | Performed by: EMERGENCY MEDICINE

## 2020-03-03 PROCEDURE — 85025 COMPLETE CBC W/AUTO DIFF WBC: CPT | Performed by: EMERGENCY MEDICINE

## 2020-03-03 RX ORDER — ONDANSETRON 2 MG/ML
4 INJECTION INTRAMUSCULAR; INTRAVENOUS ONCE
Status: COMPLETED | OUTPATIENT
Start: 2020-03-03 | End: 2020-03-03

## 2020-03-03 RX ORDER — ACETAMINOPHEN 160 MG/5ML
650 SUSPENSION, ORAL (FINAL DOSE FORM) ORAL ONCE
Status: COMPLETED | OUTPATIENT
Start: 2020-03-03 | End: 2020-03-03

## 2020-03-03 RX ORDER — SODIUM CHLORIDE, SODIUM LACTATE, POTASSIUM CHLORIDE, CALCIUM CHLORIDE 600; 310; 30; 20 MG/100ML; MG/100ML; MG/100ML; MG/100ML
500 INJECTION, SOLUTION INTRAVENOUS CONTINUOUS
Status: DISCONTINUED | OUTPATIENT
Start: 2020-03-03 | End: 2020-03-03 | Stop reason: HOSPADM

## 2020-03-03 RX ORDER — ONDANSETRON 4 MG/1
8 TABLET, ORALLY DISINTEGRATING ORAL EVERY 8 HOURS PRN
Qty: 10 TABLET | Refills: 3 | Status: SHIPPED | OUTPATIENT
Start: 2020-03-03 | End: 2020-08-20 | Stop reason: ALTCHOICE

## 2020-03-03 RX ORDER — ONDANSETRON 4 MG/1
8 TABLET, ORALLY DISINTEGRATING ORAL ONCE
Status: COMPLETED | OUTPATIENT
Start: 2020-03-03 | End: 2020-03-03

## 2020-03-03 RX ADMIN — ACETAMINOPHEN 650 MG: 650 SUSPENSION ORAL at 14:21

## 2020-03-03 RX ADMIN — ONDANSETRON 8 MG: 4 TABLET, ORALLY DISINTEGRATING ORAL at 14:20

## 2020-03-03 RX ADMIN — SODIUM CHLORIDE, SODIUM LACTATE, POTASSIUM CHLORIDE, AND CALCIUM CHLORIDE 500 ML: .6; .31; .03; .02 INJECTION, SOLUTION INTRAVENOUS at 11:41

## 2020-03-03 RX ADMIN — ONDANSETRON 4 MG: 2 INJECTION INTRAMUSCULAR; INTRAVENOUS at 11:36

## 2020-03-03 NOTE — DISCHARGE INSTRUCTIONS
Diagnosis; nausea/ vomiting     - start diet out with frequent sips of liquids and advance diet as tolerated for more solid foods    - for nausea/ vomiting: zofran 2 tablets dissolve in the mouth  every 6-8 hrs as needed     - as of now without diarrhea- I can not call you a gastroenteritis - which usually is a viral self limited  vomiting/diarrheal illness that can last for several days to a week-  though if you start with diarrhea this is likely what you have     - please return to  the er for any intractable vomiting with the inability to keep anything down by mouth // any bloody/coffee ground vomiting/ any new/ worsening abdominal pain/ any bloody black tarry stools or any new/ worsening/concerning symptoms to you     -

## 2020-03-03 NOTE — ED NOTES
Patient not vomiting after PO challenge, however, still complaints of nausea  Provider at bedside       April Kezia Slade, RN  03/03/20 7414

## 2020-03-03 NOTE — ED PROVIDER NOTES
History  Chief Complaint   Patient presents with    Vomiting     X15 SINCE 0300  2 BM'S  DENIES ABDOMINAL PAIN     78 yr female started this am with multiple episodes of food content n/v--  With no abd pain- gu comps-- with looser brown but not diarrheal stools this am -- no fevers- resp comps- no cp/sob- no recent gerd/dyspepsia/ progressive dysphagia- recent neg egd- no ill contacts/ travel/ new foods- diet - no other comps-       History provided by:  Patient   used: No    Vomiting   Timing:  Intermittent  Associated symptoms: no abdominal pain, no chills, no diarrhea and no fever        Prior to Admission Medications   Prescriptions Last Dose Informant Patient Reported? Taking? LORazepam (ATIVAN) 0 5 mg tablet   No No   Sig: Take 1 tablet (0 5 mg total) by mouth daily as needed for anxiety   Omega-3 Fatty Acids (FISH OIL) 1,000 mg   Yes No   Sig: Take 1,000 mg by mouth daily   aspirin 81 MG tablet  Self Yes No   Sig: Take 81 mg by mouth daily     cholecalciferol (VITAMIN D3) 1,000 units tablet  Self Yes No   Sig: Take 1,000 Units by mouth daily    cyanocobalamin (VITAMIN B-12) 1,000 mcg tablet  Self Yes No   Sig: Take 1,000 mcg by mouth daily   diclofenac sodium (VOLTAREN) 1 %  Self No No   Sig: Apply 4 g topically 2 (two) times a day as needed (knee pain)   esomeprazole (NexIUM) 20 mg capsule  Self Yes No   Sig: Take 20 mg by mouth 2 (two) times a day    fexofenadine (ALLEGRA) 180 MG tablet  Self Yes No   Sig: Take 1 tablet by mouth as needed    fluticasone (FLONASE) 50 mcg/act nasal spray   No No   Si spray into each nostril daily   ibuprofen (MOTRIN) 800 mg tablet  Self Yes No   Sig: TAKE 1 TABLET BY MOUTH THREE TIMES A DAY WITH FOOD AS NEEDED   naproxen sodium (ALEVE) 220 MG tablet  Self Yes No   Sig: Take 220 mg by mouth daily as needed     nystatin (MYCOSTATIN) cream   Yes No   Sig: APPLY TO THE AFFECTED AREA(S) BY TOPICAL ROUTE 2 TIMES PER DAY FOR 2 WEEKS   nystatin (MYCOSTATIN) powder  Self No No   Sig: Apply topically 3 (three) times a day   terbinafine (LamISIL) 1 % cream   No No   Sig: Apply topically 2 (two) times a day   Patient not taking: Reported on 11/26/2019      Facility-Administered Medications: None       Past Medical History:   Diagnosis Date    Anxiety     Last Assessed: [id number]    Arthritis     Last Assessed: [id number]    Colon polyp     GERD (gastroesophageal reflux disease)     Last Assessed: 31Oct2017    Hypothyroidism, adult 10/31/2017    Insomnia     Osteoporosis     Last Assessed: [id number]    Reflux esophagitis     Seasonal allergies     Urinary incontinence     Last Assessed: 31Oct2017       Past Surgical History:   Procedure Laterality Date    CHOLECYSTECTOMY      COLONOSCOPY      EXPLORATORY LAPAROTOMY      GALLBLADDER SURGERY      Last Assessed: [id number]    INCISIONAL BREAST BIOPSY      Last Assessed: [id number]   [facility] JOINT REPLACEMENT      KNEE SURGERY      Last Assessed: [id number]    DC IMPLANT PERIPH/GASTRIC NEUROSTIM/ N/A 3/23/2016    Procedure:  REMOVAL IPG BATTERY ;  Surgeon: Teodoro Liu MD;  [facility];  Service: UroGynecology           REPLACEMENT TOTAL KNEE Right 04/29/2019    ROOT CANAL  03/11/2019    SHOULDER SURGERY Left     SHOULDER SURGERY      Last Assessed: [id number]    TONSILLECTOMY      last Assessed: 31Oct2017    WISDOM TOOTH EXTRACTION      Last Assessed: 30Oct65       Family History   Problem Relation Age of Onset    Lupus Mother     Other Mother         Cardiac Disorder    Other Father         Cardiac Disorder    Diabetes Father     Breast cancer Maternal Aunt     Breast cancer Cousin      I have reviewed and agree with the history as documented      E-Cigarette/Vaping    E-Cigarette Use Never User      E-Cigarette/Vaping Substances     Social History     Tobacco Use    Smoking status: Never Smoker    Smokeless tobacco: Never Used   Substance Use Topics    Alcohol use: Yes     Frequency: 2-4 times a month     Drinks per session: 1 or 2     Binge frequency: Never     Comment: Social - wine    Drug use: No       Review of Systems   Constitutional: Positive for activity change, appetite change and fatigue  Negative for chills, diaphoresis, fever and unexpected weight change  HENT: Negative  Eyes: Negative  Respiratory: Negative  Cardiovascular: Negative  Gastrointestinal: Positive for nausea and vomiting  Negative for abdominal distention, abdominal pain, anal bleeding, blood in stool, constipation, diarrhea and rectal pain  Endocrine: Negative  Genitourinary: Negative  Musculoskeletal: Negative  Skin: Negative  Allergic/Immunologic: Negative  Neurological: Negative  Hematological: Negative  Psychiatric/Behavioral: Negative  Physical Exam  Physical Exam   Constitutional: She is oriented to person, place, and time  She appears well-developed and well-nourished  No distress  avss- well appearing- in nad- pulse ox  96 % on ra- interpretation is normal- no intervention- htn   HENT:   Head: Normocephalic and atraumatic  Eyes: Pupils are equal, round, and reactive to light  Conjunctivae and EOM are normal  Right eye exhibits no discharge  Left eye exhibits no discharge  No scleral icterus  Mm pink   Neck: Normal range of motion  Neck supple  No JVD present  No tracheal deviation present  No thyromegaly present  Cardiovascular: Normal rate, regular rhythm, normal heart sounds and intact distal pulses  Exam reveals no gallop and no friction rub  No murmur heard  Pulmonary/Chest: Effort normal and breath sounds normal  No stridor  No respiratory distress  She has no wheezes  She has no rales  She exhibits no tenderness  Abdominal: Soft  Bowel sounds are normal  She exhibits no distension and no mass  There is no tenderness  There is no rebound and no guarding  No hernia     Soft nt/nd- no hsm/ no cva tenderness/ no peritoneal signs- no pulsatile abd mass/bruit   Musculoskeletal: Normal range of motion  She exhibits no edema, tenderness or deformity  Equal bilateral radial/dp pulses- no ble edema/calf tenderness/asym/ erythema   Lymphadenopathy:     She has no cervical adenopathy  Neurological: She is alert and oriented to person, place, and time  No cranial nerve deficit or sensory deficit  She exhibits normal muscle tone  Coordination normal    Normal non focal neuro exam    Skin: Skin is warm  Capillary refill takes less than 2 seconds  No rash noted  She is not diaphoretic  No erythema  No pallor  Psychiatric: She has a normal mood and affect  Her behavior is normal  Judgment and thought content normal    Nursing note and vitals reviewed        Vital Signs  ED Triage Vitals [03/03/20 1106]   Temperature Pulse Respirations Blood Pressure SpO2   98 7 °F (37 1 °C) 90 18 (!) 184/87 96 %      Temp Source Heart Rate Source Patient Position - Orthostatic VS BP Location FiO2 (%)   Oral Monitor -- Right arm --      Pain Score       No Pain           Vitals:    03/03/20 1106   BP: (!) 184/87   Pulse: 90         Visual Acuity      ED Medications  Medications   ondansetron (ZOFRAN) injection 4 mg (has no administration in time range)   lactated ringers infusion 500 mL (has no administration in time range)       Diagnostic Studies  Results Reviewed     Procedure Component Value Units Date/Time    CBC and differential [[id number]]     Lab Status:  No result Specimen:  Blood     Basic metabolic panel [[id number]]     Lab Status:  No result Specimen:  Blood                  No orders to display              Procedures  Procedures         ED Course  ED Course as of Mar 03 2032   Tue Mar 03, 2020   1232 Er md note- pt- re-evaluated- feels fatigued- but no further vomitus- will start po challenge      1342 - er md note- pt- re-evaluated- feels improved- tolerated po  challenge in er with no comps- will d/c                                   MDM      Disposition  Final diagnoses:   None     ED Disposition     None      Follow-up Information    None         Patient's Medications   Discharge Prescriptions    No medications on file     No discharge procedures on file      PDMP Review     None          ED Provider  Electronically Signed by           Lukasz Medrano MD  03/03/20 2889

## 2020-03-03 NOTE — ED NOTES
Patient ambulated to bathroom, upon returning to room, reported "throwing up a little bit"  Made provider aware       April Syeda Antonio RN  03/03/20 9774

## 2020-03-05 ENCOUNTER — OFFICE VISIT (OUTPATIENT)
Dept: INTERNAL MEDICINE CLINIC | Age: 70
End: 2020-03-05
Payer: MEDICARE

## 2020-03-05 VITALS
WEIGHT: 179 LBS | BODY MASS INDEX: 31.71 KG/M2 | OXYGEN SATURATION: 97 % | DIASTOLIC BLOOD PRESSURE: 68 MMHG | SYSTOLIC BLOOD PRESSURE: 118 MMHG | HEIGHT: 63 IN | TEMPERATURE: 97.6 F | HEART RATE: 67 BPM

## 2020-03-05 DIAGNOSIS — K29.00 ACUTE GASTRITIS WITHOUT HEMORRHAGE, UNSPECIFIED GASTRITIS TYPE: Primary | ICD-10-CM

## 2020-03-05 DIAGNOSIS — K21.00 REFLUX ESOPHAGITIS: ICD-10-CM

## 2020-03-05 DIAGNOSIS — L30.9 DERMATITIS: ICD-10-CM

## 2020-03-05 PROCEDURE — 3008F BODY MASS INDEX DOCD: CPT | Performed by: PHYSICIAN ASSISTANT

## 2020-03-05 PROCEDURE — 4040F PNEUMOC VAC/ADMIN/RCVD: CPT | Performed by: PHYSICIAN ASSISTANT

## 2020-03-05 PROCEDURE — 99214 OFFICE O/P EST MOD 30 MIN: CPT | Performed by: PHYSICIAN ASSISTANT

## 2020-03-05 PROCEDURE — 1160F RVW MEDS BY RX/DR IN RCRD: CPT | Performed by: PHYSICIAN ASSISTANT

## 2020-03-05 PROCEDURE — 1036F TOBACCO NON-USER: CPT | Performed by: PHYSICIAN ASSISTANT

## 2020-03-05 RX ORDER — TRIAMCINOLONE ACETONIDE 1 MG/G
CREAM TOPICAL 2 TIMES DAILY
Qty: 45 G | Refills: 0 | Status: SHIPPED | OUTPATIENT
Start: 2020-03-05

## 2020-03-05 NOTE — PROGRESS NOTES
Assessment/Plan:         Diagnoses and all orders for this visit:    Acute gastritis without hemorrhage, unspecified gastritis type  Comments:  resolved, pt to report any further nausea or diarrhea  may need stool testing if diarrhea recurs  f/u labs in 2-3 weeks   Orders:  -     CBC and differential; Future  -     Comprehensive metabolic panel; Future    Dermatitis  Comments:  keep area moisturized  triamcinolone bid x 14 days   Orders:  -     triamcinolone (KENALOG) 0 1 % cream; Apply topically 2 (two) times a day    Reflux esophagitis  Comments:  continue nexium daily         Follow brat diet, can advance diet to bland foods over weekend  Avoid dairy other than yogurt for 2-3 more weeks    Subjective:      Patient ID: Quinn Hernandez is a 78 y o  female  C/o persistent vomiting episode that began suddenly early am 3/3/20  She woke up vomiting and states she vomited up to 15 times  Pt had labs done in ER and was discharged  Pt states she was told it was "gastic irritation" and she wasn't contagious  Pt was discharged on clear liquid diet which she tolerated and was advanced to brat diet yesterday which she has been tolerating  Pt was given zofran but has not needed to take this  Pt reports she had diarrhea yesterday again and took 1 imodium and has not gone since then  Pt denies abdominal pain with this episode  The following portions of the patient's history were reviewed and updated as appropriate: allergies, current medications, past family history, past medical history, past social history, past surgical history and problem list     Review of Systems   Constitutional: Positive for appetite change  Negative for activity change, chills, fatigue and fever  HENT: Negative for congestion, ear pain and sore throat  Eyes: Negative for redness, itching and visual disturbance  Respiratory: Negative for cough, shortness of breath and wheezing      Cardiovascular: Negative for chest pain, palpitations and leg swelling  Gastrointestinal: Negative for abdominal distention, abdominal pain, constipation, diarrhea and nausea  Genitourinary: Negative for difficulty urinating, dysuria and hematuria  Musculoskeletal: Negative for arthralgias, back pain, joint swelling and myalgias  Skin: Negative for rash and wound  Allergic/Immunologic: Negative for environmental allergies  Neurological: Negative for dizziness, syncope, light-headedness and headaches  Hematological: Does not bruise/bleed easily  Psychiatric/Behavioral: Negative for confusion and sleep disturbance  The patient is not nervous/anxious  Past Medical History:   Diagnosis Date    Anxiety     Last Assessed: [id number]    Arthritis     Last Assessed: [id number]    Colon polyp     GERD (gastroesophageal reflux disease)     Last Assessed: 31Oct2017    Hypothyroidism, adult 10/31/2017    Insomnia     Osteoporosis     Last Assessed: [id number]    Reflux esophagitis     Seasonal allergies     Urinary incontinence     Last Assessed: 31Oct2017         Current Outpatient Medications:     aspirin 81 MG tablet, Take 81 mg by mouth daily  , Disp: , Rfl:     cholecalciferol (VITAMIN D3) 1,000 units tablet, Take 1,000 Units by mouth daily , Disp: , Rfl:     cyanocobalamin (VITAMIN B-12) 1,000 mcg tablet, Take 1,000 mcg by mouth daily, Disp: , Rfl:     diclofenac sodium (VOLTAREN) 1 %, Apply 4 g topically 2 (two) times a day as needed (knee pain), Disp: 1 Tube, Rfl: 1    esomeprazole (NexIUM) 20 mg capsule, Take 20 mg by mouth 2 (two) times a day , Disp: , Rfl:     fexofenadine (ALLEGRA) 180 MG tablet, Take 1 tablet by mouth as needed , Disp: , Rfl:     fluticasone (FLONASE) 50 mcg/act nasal spray, 1 spray into each nostril daily, Disp: 1 Bottle, Rfl: 1    ibuprofen (MOTRIN) 800 mg tablet, TAKE 1 TABLET BY MOUTH THREE TIMES A DAY WITH FOOD AS NEEDED, Disp: , Rfl: 1    LORazepam (ATIVAN) 0 5 mg tablet, Take 1 tablet (0 5 mg total) by mouth daily as needed for anxiety, Disp: 30 tablet, Rfl: 0    naproxen sodium (ALEVE) 220 MG tablet, Take 220 mg by mouth daily as needed  , Disp: , Rfl:     nystatin (MYCOSTATIN) cream, APPLY TO THE AFFECTED AREA(S) BY TOPICAL ROUTE 2 TIMES PER DAY FOR 2 WEEKS, Disp: , Rfl: 2    nystatin (MYCOSTATIN) powder, Apply topically 3 (three) times a day, Disp: 56 7 g, Rfl: 1    Omega-3 Fatty Acids (FISH OIL) 1,000 mg, Take 1,000 mg by mouth daily, Disp: , Rfl:     ondansetron (Zofran ODT) 4 mg disintegrating tablet, Take 2 tablets (8 mg total) by mouth every 8 (eight) hours as needed for nausea or vomiting for up to 20 doses (Patient not taking: Reported on 3/5/2020), Disp: 10 tablet, Rfl: 3    terbinafine (LamISIL) 1 % cream, Apply topically 2 (two) times a day (Patient not taking: Reported on 11/26/2019), Disp: 30 g, Rfl: 0    triamcinolone (KENALOG) 0 1 % cream, Apply topically 2 (two) times a day, Disp: 45 g, Rfl: 0    Allergies   Allergen Reactions    Fluticasone     Sulfa Antibiotics Rash and Edema       Social History   Past Surgical History:   Procedure Laterality Date    CHOLECYSTECTOMY      COLONOSCOPY      EXPLORATORY LAPAROTOMY      GALLBLADDER SURGERY      Last Assessed: [id number]    INCISIONAL BREAST BIOPSY      Last Assessed: [id number]   [address] [place] [address] JOINT REPLACEMENT      KNEE SURGERY      Last Assessed: [id number]    MS IMPLANT PERIPH/GASTRIC NEUROSTIM/ N/A 3/23/2016    Procedure:  REMOVAL IPG BATTERY ;  Surgeon: Valentina Luo MD;  [facility];  Service: UroGynecology           REPLACEMENT TOTAL KNEE Right 04/29/2019    ROOT CANAL  03/11/2019    SHOULDER SURGERY Left     SHOULDER SURGERY      Last Assessed: [id number]    TONSILLECTOMY      last Assessed: 31Oct2017    WISDOM TOOTH EXTRACTION      Last Assessed: 31Oct2017     Family History   Problem Relation Age of Onset    Lupus Mother     Other Mother         Cardiac Disorder    Other Father         Cardiac Disorder    Diabetes Father     Breast cancer Maternal Aunt     Breast cancer Cousin        Objective:  /68 (BP Location: Left arm, Patient Position: Sitting, Cuff Size: Standard)   Pulse 67   Temp 97 6 °F (36 4 °C) (Tympanic)   Ht 5' 2 5" (1 588 m) Comment: shoes on  Wt 81 2 kg (179 lb) Comment: shoes on  SpO2 97%   BMI 32 22 kg/m²        Physical Exam   Constitutional: She is oriented to person, place, and time  She appears well-developed and well-nourished  No distress  HENT:   Head: Normocephalic and atraumatic  Right Ear: External ear normal    Left Ear: External ear normal    Mouth/Throat: Oropharynx is clear and moist    Eyes: Pupils are equal, round, and reactive to light  EOM are normal    Neck: Normal range of motion  Neck supple  Cardiovascular: Normal rate, regular rhythm and intact distal pulses  No murmur heard  Pulmonary/Chest: Effort normal and breath sounds normal  No respiratory distress  She has no wheezes  She has no rales  Abdominal: Soft  Bowel sounds are normal  She exhibits no distension and no mass  There is no tenderness  There is no rebound and no guarding  bs present    Musculoskeletal: Normal range of motion  She exhibits no edema or deformity  Neurological: She is alert and oriented to person, place, and time  Skin: Skin is warm and dry  No rash noted  Psychiatric: She has a normal mood and affect  Her behavior is normal    Vitals reviewed

## 2020-03-27 LAB
ALBUMIN SERPL-MCNC: 4.1 G/DL (ref 3.6–5.1)
ALBUMIN/GLOB SERPL: 1.6 (CALC) (ref 1–2.5)
ALP SERPL-CCNC: 108 U/L (ref 37–153)
ALT SERPL-CCNC: 17 U/L (ref 6–29)
AST SERPL-CCNC: 11 U/L (ref 10–35)
BASOPHILS # BLD AUTO: 76 CELLS/UL (ref 0–200)
BASOPHILS NFR BLD AUTO: 1 %
BILIRUB SERPL-MCNC: 0.5 MG/DL (ref 0.2–1.2)
BUN SERPL-MCNC: 18 MG/DL (ref 7–25)
BUN/CREAT SERPL: NORMAL (CALC) (ref 6–22)
CALCIUM SERPL-MCNC: 9.5 MG/DL (ref 8.6–10.4)
CHLORIDE SERPL-SCNC: 104 MMOL/L (ref 98–110)
CO2 SERPL-SCNC: 25 MMOL/L (ref 20–32)
CREAT SERPL-MCNC: 0.79 MG/DL (ref 0.6–0.93)
EOSINOPHIL # BLD AUTO: 410 CELLS/UL (ref 15–500)
EOSINOPHIL NFR BLD AUTO: 5.4 %
ERYTHROCYTE [DISTWIDTH] IN BLOOD BY AUTOMATED COUNT: 13.8 % (ref 11–15)
GLOBULIN SER CALC-MCNC: 2.6 G/DL (CALC) (ref 1.9–3.7)
GLUCOSE SERPL-MCNC: 89 MG/DL (ref 65–99)
HCT VFR BLD AUTO: 39.6 % (ref 35–45)
HGB BLD-MCNC: 12.7 G/DL (ref 11.7–15.5)
LYMPHOCYTES # BLD AUTO: 3101 CELLS/UL (ref 850–3900)
LYMPHOCYTES NFR BLD AUTO: 40.8 %
MCH RBC QN AUTO: 26 PG (ref 27–33)
MCHC RBC AUTO-ENTMCNC: 32.1 G/DL (ref 32–36)
MCV RBC AUTO: 81.1 FL (ref 80–100)
MONOCYTES # BLD AUTO: 540 CELLS/UL (ref 200–950)
MONOCYTES NFR BLD AUTO: 7.1 %
NEUTROPHILS # BLD AUTO: 3473 CELLS/UL (ref 1500–7800)
NEUTROPHILS NFR BLD AUTO: 45.7 %
PLATELET # BLD AUTO: 333 THOUSAND/UL (ref 140–400)
PMV BLD REES-ECKER: 11.9 FL (ref 7.5–12.5)
POTASSIUM SERPL-SCNC: 4.5 MMOL/L (ref 3.5–5.3)
PROT SERPL-MCNC: 6.7 G/DL (ref 6.1–8.1)
RBC # BLD AUTO: 4.88 MILLION/UL (ref 3.8–5.1)
SL AMB EGFR AFRICAN AMERICAN: 88 ML/MIN/1.73M2
SL AMB EGFR NON AFRICAN AMERICAN: 76 ML/MIN/1.73M2
SODIUM SERPL-SCNC: 138 MMOL/L (ref 135–146)
WBC # BLD AUTO: 7.6 THOUSAND/UL (ref 3.8–10.8)

## 2020-03-31 ENCOUNTER — TELEPHONE (OUTPATIENT)
Dept: INTERNAL MEDICINE CLINIC | Age: 70
End: 2020-03-31

## 2020-03-31 DIAGNOSIS — R05.9 COUGH: Primary | ICD-10-CM

## 2020-04-01 ENCOUNTER — APPOINTMENT (OUTPATIENT)
Dept: RADIOLOGY | Age: 70
End: 2020-04-01
Payer: MEDICARE

## 2020-04-01 ENCOUNTER — TELEMEDICINE (OUTPATIENT)
Dept: INTERNAL MEDICINE CLINIC | Age: 70
End: 2020-04-01
Payer: MEDICARE

## 2020-04-01 VITALS — TEMPERATURE: 97.9 F

## 2020-04-01 DIAGNOSIS — R05.9 COUGH: ICD-10-CM

## 2020-04-01 DIAGNOSIS — J01.00 ACUTE NON-RECURRENT MAXILLARY SINUSITIS: Primary | ICD-10-CM

## 2020-04-01 DIAGNOSIS — Z12.11 SCREENING FOR MALIGNANT NEOPLASM OF COLON: ICD-10-CM

## 2020-04-01 PROCEDURE — 99214 OFFICE O/P EST MOD 30 MIN: CPT | Performed by: PHYSICIAN ASSISTANT

## 2020-04-01 PROCEDURE — 71046 X-RAY EXAM CHEST 2 VIEWS: CPT

## 2020-04-01 RX ORDER — CEFUROXIME AXETIL 250 MG/1
250 TABLET ORAL EVERY 12 HOURS SCHEDULED
Qty: 14 TABLET | Refills: 0 | Status: SHIPPED | OUTPATIENT
Start: 2020-04-01 | End: 2020-04-08

## 2020-04-07 DIAGNOSIS — R06.00 PND (PAROXYSMAL NOCTURNAL DYSPNEA): ICD-10-CM

## 2020-04-07 RX ORDER — FLUTICASONE PROPIONATE 50 MCG
SPRAY, SUSPENSION (ML) NASAL
Qty: 16 ML | Refills: 1 | Status: SHIPPED | OUTPATIENT
Start: 2020-04-07 | End: 2021-10-06 | Stop reason: SDUPTHER

## 2020-04-30 DIAGNOSIS — Z12.11 SCREENING FOR MALIGNANT NEOPLASM OF COLON: ICD-10-CM

## 2020-05-08 ENCOUNTER — TELEPHONE (OUTPATIENT)
Dept: INTERNAL MEDICINE CLINIC | Age: 70
End: 2020-05-08

## 2020-05-13 DIAGNOSIS — J40 BRONCHITIS: ICD-10-CM

## 2020-05-13 DIAGNOSIS — R05.9 COUGH: Primary | ICD-10-CM

## 2020-05-13 RX ORDER — ALBUTEROL SULFATE 90 UG/1
2 AEROSOL, METERED RESPIRATORY (INHALATION) EVERY 6 HOURS PRN
Qty: 1 INHALER | Refills: 1 | Status: SHIPPED | OUTPATIENT
Start: 2020-05-13 | End: 2020-06-18

## 2020-06-18 ENCOUNTER — TELEMEDICINE (OUTPATIENT)
Dept: INTERNAL MEDICINE CLINIC | Age: 70
End: 2020-06-18
Payer: MEDICARE

## 2020-06-18 VITALS — HEART RATE: 64 BPM | TEMPERATURE: 97.6 F | SYSTOLIC BLOOD PRESSURE: 146 MMHG | DIASTOLIC BLOOD PRESSURE: 86 MMHG

## 2020-06-18 DIAGNOSIS — L23.7 PLANT ALLERGIC CONTACT DERMATITIS: Primary | ICD-10-CM

## 2020-06-18 DIAGNOSIS — J45.909 BRONCHITIS, ALLERGIC, UNSPECIFIED ASTHMA SEVERITY, UNCOMPLICATED: ICD-10-CM

## 2020-06-18 DIAGNOSIS — B49 FUNGAL INFECTION: ICD-10-CM

## 2020-06-18 DIAGNOSIS — M15.9 OSTEOARTHRITIS OF MULTIPLE JOINTS, UNSPECIFIED OSTEOARTHRITIS TYPE: ICD-10-CM

## 2020-06-18 PROCEDURE — 99214 OFFICE O/P EST MOD 30 MIN: CPT | Performed by: PHYSICIAN ASSISTANT

## 2020-06-18 RX ORDER — PREDNISONE 10 MG/1
TABLET ORAL
Qty: 30 TABLET | Refills: 0 | Status: SHIPPED | OUTPATIENT
Start: 2020-06-18 | End: 2020-07-01 | Stop reason: ALTCHOICE

## 2020-06-18 RX ORDER — NYSTATIN 100000 [USP'U]/G
POWDER TOPICAL 3 TIMES DAILY
Qty: 120 G | Refills: 1 | Status: SHIPPED | OUTPATIENT
Start: 2020-06-18 | End: 2021-06-10

## 2020-07-01 ENCOUNTER — OFFICE VISIT (OUTPATIENT)
Dept: INTERNAL MEDICINE CLINIC | Age: 70
End: 2020-07-01
Payer: MEDICARE

## 2020-07-01 VITALS
DIASTOLIC BLOOD PRESSURE: 70 MMHG | SYSTOLIC BLOOD PRESSURE: 120 MMHG | HEIGHT: 61 IN | TEMPERATURE: 97.3 F | WEIGHT: 178.8 LBS | HEART RATE: 66 BPM | OXYGEN SATURATION: 99 % | BODY MASS INDEX: 33.76 KG/M2

## 2020-07-01 DIAGNOSIS — J45.20 MILD INTERMITTENT ASTHMATIC BRONCHITIS WITHOUT COMPLICATION: ICD-10-CM

## 2020-07-01 DIAGNOSIS — F40.243 FEAR OF FLYING: ICD-10-CM

## 2020-07-01 DIAGNOSIS — R09.82 POST-NASAL DRIP: ICD-10-CM

## 2020-07-01 DIAGNOSIS — L29.9 ITCH OF SKIN: ICD-10-CM

## 2020-07-01 DIAGNOSIS — F41.9 ANXIETY: ICD-10-CM

## 2020-07-01 DIAGNOSIS — J30.2 SEASONAL ALLERGIES: Primary | ICD-10-CM

## 2020-07-01 DIAGNOSIS — M19.90 ARTHRITIS: ICD-10-CM

## 2020-07-01 PROCEDURE — 1160F RVW MEDS BY RX/DR IN RCRD: CPT | Performed by: INTERNAL MEDICINE

## 2020-07-01 PROCEDURE — 1036F TOBACCO NON-USER: CPT | Performed by: INTERNAL MEDICINE

## 2020-07-01 PROCEDURE — 4040F PNEUMOC VAC/ADMIN/RCVD: CPT | Performed by: INTERNAL MEDICINE

## 2020-07-01 PROCEDURE — 99214 OFFICE O/P EST MOD 30 MIN: CPT | Performed by: INTERNAL MEDICINE

## 2020-07-01 PROCEDURE — 3008F BODY MASS INDEX DOCD: CPT | Performed by: INTERNAL MEDICINE

## 2020-07-01 RX ORDER — FEXOFENADINE HCL 180 MG/1
180 TABLET ORAL DAILY
Qty: 90 TABLET | Refills: 1 | Status: SHIPPED | OUTPATIENT
Start: 2020-07-01 | End: 2020-12-21

## 2020-07-01 RX ORDER — LORAZEPAM 0.5 MG/1
0.5 TABLET ORAL DAILY PRN
Qty: 30 TABLET | Refills: 0 | Status: SHIPPED | OUTPATIENT
Start: 2020-07-01 | End: 2021-01-07 | Stop reason: SDUPTHER

## 2020-07-01 NOTE — PROGRESS NOTES
Chief Complaint   Patient presents with    Follow-up    Rash     better after medication     Cough     better after prednisone     Arthritis         Assessment/Plan:    Asthmatic bronchitis - Patient has had a persistent nocturnal dry cough  She also complains of a day time tickle with a slight cough  She has used inhaler steroids in the past  But after talking to Bethany Enrique, she realized she was intolerant of the inhaler  She was given a short course of steroid which resulted in almost complete resolution of her cough  She is now down to one pill of prednisone a day  Her cough is resurfacing although to a much lesser degree  On exam, her lungs are clear on osculation  Hx of post nasal drip for which she is taking nasal steroids  I do not think she needed another course of steroids at this time  I advised to try using her inhaler steroid again but patient refuses at this time  Therefore, she was advised to try Delsum at night  I explained to her that this type of bronchitis can last anywhere from 6 weeks to 6 months  Arthritis of knees and hands- while she was taking the higher dosage of steroids, her arthritis improved  However, now that she is almost done with her course, her arthritis is about the same  She is followed by orthopedics  I advised her to apply local cream      Obesity - Body mass index is 33 33 kg/m²  She was advised that at this time she really needs to be on a strict diet  Her goal BMI should be less than 30  Rash - Patient had a rash which improved with the use of prednisone as well and has now resolved with no reoccurrence  BMI Counseling: Body mass index is 33 33 kg/m²  The BMI is above normal  Nutrition recommendations include decreasing portion sizes, encouraging healthy choices of fruits and vegetables, decreasing fast food intake, consuming healthier snacks, limiting drinks that contain sugar, moderation in carbohydrate intake and reducing intake of cholesterol   Exercise recommendations include moderate physical activity 150 minutes/week  No pharmacotherapy was ordered  Diagnoses and all orders for this visit:    Seasonal allergies    Anxiety  -     LORazepam (ATIVAN) 0 5 mg tablet; Take 1 tablet (0 5 mg total) by mouth daily as needed for anxiety    Fear of flying  -     LORazepam (ATIVAN) 0 5 mg tablet; Take 1 tablet (0 5 mg total) by mouth daily as needed for anxiety    Itch of skin  -     fexofenadine (ALLEGRA) 180 MG tablet; Take 1 tablet (180 mg total) by mouth daily    Post-nasal drip    BMI 33 0-33 9,adult    Mild intermittent asthmatic bronchitis without complication    Arthritis    Other orders  -     NON FORMULARY; CBD cream, applies to B/L knees          Subjective:      Patient ID: Anabela Caldera is a 69 y o  female  This is a case of a 65-year-old white female who was recently given a course of prednisone for a cough and a rash  The rash and cough have greatly improved  During her time with prednisone her arthritis greatly improved  Now that she is almost off her course of steroid she again has arthritic complaints mostly in  Knees and hands  The patient was not tolerant of the steroid inhaler  The patient states that her cough is mostly in the evening it is dry and she feels that there is a tickle in her throat at causes her to cough because she has had course of steroids will start her on just a cough medication to quiet down(Delsym)  The patient is to continue on her nasal spray for postnasal drip  The following portions of the patient's history were reviewed and updated as appropriate: allergies, current medications, past family history, past medical history, past social history, past surgical history and problem list     Review of Systems   Constitutional: Positive for fatigue  Negative for activity change  HENT: Positive for postnasal drip, rhinorrhea, sinus pressure and sinus pain      Eyes: Negative  Respiratory: Positive for cough  Genitourinary: Positive for urgency  Negative for difficulty urinating, enuresis and frequency  Incotinence   Musculoskeletal: Positive for arthralgias, back pain and gait problem  Allergic/Immunologic: Negative for environmental allergies  Neurological: Negative for dizziness, seizures, light-headedness and numbness  Psychiatric/Behavioral: Negative  Allergies   Allergen Reactions    Sulfa Antibiotics Rash and Edema     Current Outpatient Medications on File Prior to Visit   Medication Sig Dispense Refill    aspirin 81 MG tablet Take 81 mg by mouth daily        cholecalciferol (VITAMIN D3) 1,000 units tablet Take 1,000 Units by mouth daily       cyanocobalamin (VITAMIN B-12) 1,000 mcg tablet Take 1,000 mcg by mouth daily      diclofenac sodium (VOLTAREN) 1 % Apply 4 g topically 2 (two) times a day as needed (knee pain) 1 Tube 1    esomeprazole (NexIUM) 20 mg capsule Take 20 mg by mouth 2 (two) times a day       fluticasone (FLONASE) 50 mcg/act nasal spray SPRAY 1 SPRAY INTO EACH NOSTRIL EVERY DAY 16 mL 1    ibuprofen (MOTRIN) 800 mg tablet TAKE 1 TABLET BY MOUTH THREE TIMES A DAY WITH FOOD AS NEEDED  1    naproxen sodium (ALEVE) 220 MG tablet Take 220 mg by mouth daily as needed        NON FORMULARY CBD cream, applies to B/L knees      nystatin (MYCOSTATIN) cream APPLY TO THE AFFECTED AREA(S) BY TOPICAL ROUTE 2 TIMES PER DAY FOR 2 WEEKS  2    nystatin (MYCOSTATIN) powder Apply topically 3 (three) times a day 120 g 1    Omega-3 Fatty Acids (FISH OIL) 1,000 mg Take 1,000 mg by mouth daily      triamcinolone (KENALOG) 0 1 % cream Apply topically 2 (two) times a day 45 g 0    [DISCONTINUED] fexofenadine (ALLEGRA) 180 MG tablet Take 1 tablet by mouth as needed       [DISCONTINUED] LORazepam (ATIVAN) 0 5 mg tablet Take 1 tablet (0 5 mg total) by mouth daily as needed for anxiety 30 tablet 0    ondansetron (Zofran ODT) 4 mg disintegrating tablet Take 2 tablets (8 mg total) by mouth every 8 (eight) hours as needed for nausea or vomiting for up to 20 doses (Patient not taking: Reported on 3/5/2020) 10 tablet 3    terbinafine (LamISIL) 1 % cream Apply topically 2 (two) times a day (Patient not taking: Reported on 11/26/2019) 30 g 0    [DISCONTINUED] predniSONE 10 mg tablet 4 tabs daily for 3 days then 3 tabs daily x 3 days then 2 tabs daily x 3 days then 1 tab daily for 3 days  30 tablet 0     No current facility-administered medications on file prior to visit        Past Medical History:   Diagnosis Date    Anxiety     Last Assessed: [id number]    Arthritis     Last Assessed: [id number]    Colon polyp     GERD (gastroesophageal reflux disease)     Last Assessed: 31Oct2017    Hypothyroidism, adult 10/31/2017    Insomnia     Osteoporosis     Last Assessed: [id number]    Reflux esophagitis     Seasonal allergies     Urinary incontinence     Last Assessed: 31Oct2017     Past Surgical History:   Procedure Laterality Date    CHOLECYSTECTOMY      COLONOSCOPY      EXPLORATORY LAPAROTOMY      GALLBLADDER SURGERY      Last Assessed: [id number]    INCISIONAL BREAST BIOPSY      Last Assessed: [id number]   Ashly Pall JOINT REPLACEMENT      KNEE SURGERY      Last Assessed: [id number]    UT IMPLANT PERIPH/GASTRIC NEUROSTIM/ N/A 3/23/2016    Procedure:  REMOVAL IPG BATTERY ;  Surgeon: Candy Spencer MD;  Location: [facility];  Service: UroGynecology           REPLACEMENT TOTAL KNEE Right 04/29/2019    ROOT CANAL  03/11/2019    SHOULDER SURGERY Left     SHOULDER SURGERY      Last Assessed: [id number]    TONSILLECTOMY      last Assessed: 31Oct2017    WISDOM TOOTH EXTRACTION      Last Assessed: [id number]     Social History     Socioeconomic History    Marital status:      Spouse name: Not on file    Number of children: Not on file    Years of education: Not on file    Highest education level: Not on file   Occupational History  Not on file   Social Needs    Financial resource strain: Not on file    Food insecurity:     Worry: Not on file     Inability: Not on file    Transportation needs:     Medical: Not on file     Non-medical: Not on file   Tobacco Use    Smoking status: Never Smoker    Smokeless tobacco: Never Used   Substance and Sexual Activity    Alcohol use: Yes     Frequency: 2-4 times a month     Drinks per session: 1 or 2     Binge frequency: Never     Comment: Social - wine    Drug use: No    Sexual activity: Yes     Partners: Male   Lifestyle    Physical activity:     Days per week: Not on file     Minutes per session: Not on file    Stress: Not on file   Relationships    Social connections:     Talks on phone: Not on file     Gets together: Not on file     Attends Hoahaoism service: Not on file     Active member of club or organization: Not on file     Attends meetings of clubs or organizations: Not on file     Relationship status: Not on file    Intimate partner violence:     Fear of current or ex partner: Not on file     Emotionally abused: Not on file     Physically abused: Not on file     Forced sexual activity: Not on file   Other Topics Concern    Not on file   Social History Narrative    Not on file         Objective:      /70 (BP Location: Left arm, Patient Position: Sitting, Cuff Size: Large)   Pulse 66   Temp (!) 97 3 °F (36 3 °C) (Tympanic)   Ht 5' 1 42" (1 56 m)   Wt 81 1 kg (178 lb 12 8 oz)   SpO2 99%   BMI 33 33 kg/m²          Physical Exam   Constitutional: She is oriented to person, place, and time  She appears well-developed and well-nourished  No distress  HENT:   Head: Normocephalic and atraumatic  Right Ear: External ear normal    Left Ear: External ear normal    Mouth/Throat: Oropharynx is clear and moist    Eyes: EOM are normal  Right eye exhibits no discharge  Left eye exhibits no discharge  Neck: Normal range of motion  Neck supple  No tracheal deviation present   No thyromegaly present  Cardiovascular: Normal rate and regular rhythm  Exam reveals no friction rub  No murmur heard  Pulmonary/Chest: Effort normal and breath sounds normal  No respiratory distress  She has no wheezes  She has no rales  She exhibits no tenderness  Abdominal: She exhibits no distension and no mass  There is no tenderness  There is no rebound and no guarding  Musculoskeletal: Normal range of motion  She exhibits deformity  She exhibits no edema  arthrites present   Neurological: She is alert and oriented to person, place, and time  She displays normal reflexes  Skin: Skin is warm and dry  She is not diaphoretic  Psychiatric: She has a normal mood and affect  Her behavior is normal  Judgment and thought content normal    Nursing note and vitals reviewed

## 2020-08-12 ENCOUNTER — APPOINTMENT (EMERGENCY)
Dept: CT IMAGING | Facility: HOSPITAL | Age: 70
End: 2020-08-12
Payer: MEDICARE

## 2020-08-12 ENCOUNTER — HOSPITAL ENCOUNTER (EMERGENCY)
Facility: HOSPITAL | Age: 70
Discharge: HOME | End: 2020-08-13
Attending: EMERGENCY MEDICINE | Admitting: EMERGENCY MEDICINE
Payer: MEDICARE

## 2020-08-12 VITALS
BODY MASS INDEX: 33.61 KG/M2 | WEIGHT: 178 LBS | TEMPERATURE: 98.5 F | HEIGHT: 61 IN | HEART RATE: 60 BPM | OXYGEN SATURATION: 97 % | DIASTOLIC BLOOD PRESSURE: 63 MMHG | RESPIRATION RATE: 18 BRPM | SYSTOLIC BLOOD PRESSURE: 134 MMHG

## 2020-08-12 DIAGNOSIS — N39.0 UTI (URINARY TRACT INFECTION): ICD-10-CM

## 2020-08-12 DIAGNOSIS — R10.9 ABDOMINAL PAIN: Primary | ICD-10-CM

## 2020-08-12 LAB
ALBUMIN SERPL BCP-MCNC: 3.8 G/DL (ref 3.5–5)
ALP SERPL-CCNC: 119 U/L (ref 46–116)
ALT SERPL W P-5'-P-CCNC: 26 U/L (ref 12–78)
ANION GAP SERPL CALCULATED.3IONS-SCNC: 8 MMOL/L (ref 4–13)
AST SERPL W P-5'-P-CCNC: 21 U/L (ref 5–45)
BACTERIA UR QL AUTO: ABNORMAL /HPF
BASOPHILS # BLD AUTO: 0.09 THOUSANDS/ΜL (ref 0–0.1)
BASOPHILS NFR BLD AUTO: 1 % (ref 0–1)
BILIRUB SERPL-MCNC: 0.29 MG/DL (ref 0.2–1)
BILIRUB UR QL STRIP: NEGATIVE
BUN SERPL-MCNC: 18 MG/DL (ref 5–25)
CALCIUM SERPL-MCNC: 9 MG/DL (ref 8.3–10.1)
CHLORIDE SERPL-SCNC: 104 MMOL/L (ref 100–108)
CLARITY UR: ABNORMAL
CO2 SERPL-SCNC: 27 MMOL/L (ref 21–32)
COLOR UR: YELLOW
CREAT SERPL-MCNC: 1.05 MG/DL (ref 0.6–1.3)
EOSINOPHIL # BLD AUTO: 0.44 THOUSAND/ΜL (ref 0–0.61)
EOSINOPHIL NFR BLD AUTO: 4 % (ref 0–6)
ERYTHROCYTE [DISTWIDTH] IN BLOOD BY AUTOMATED COUNT: 14.3 % (ref 11.6–15.1)
GFR SERPL CREATININE-BSD FRML MDRD: 54 ML/MIN/1.73SQ M
GLUCOSE SERPL-MCNC: 110 MG/DL (ref 65–140)
GLUCOSE UR STRIP-MCNC: NEGATIVE MG/DL
HCT VFR BLD AUTO: 41.5 % (ref 34.8–46.1)
HGB BLD-MCNC: 13.2 G/DL (ref 11.5–15.4)
HGB UR QL STRIP.AUTO: ABNORMAL
IMM GRANULOCYTES # BLD AUTO: 0.05 THOUSAND/UL (ref 0–0.2)
IMM GRANULOCYTES NFR BLD AUTO: 1 % (ref 0–2)
KETONES UR STRIP-MCNC: NEGATIVE MG/DL
LEUKOCYTE ESTERASE UR QL STRIP: ABNORMAL
LIPASE SERPL-CCNC: 125 U/L (ref 73–393)
LYMPHOCYTES # BLD AUTO: 3 THOUSANDS/ΜL (ref 0.6–4.47)
LYMPHOCYTES NFR BLD AUTO: 28 % (ref 14–44)
MCH RBC QN AUTO: 26.8 PG (ref 26.8–34.3)
MCHC RBC AUTO-ENTMCNC: 31.8 G/DL (ref 31.4–37.4)
MCV RBC AUTO: 84 FL (ref 82–98)
MONOCYTES # BLD AUTO: 0.71 THOUSAND/ΜL (ref 0.17–1.22)
MONOCYTES NFR BLD AUTO: 7 % (ref 4–12)
MUCOUS THREADS UR QL AUTO: ABNORMAL
NEUTROPHILS # BLD AUTO: 6.32 THOUSANDS/ΜL (ref 1.85–7.62)
NEUTS SEG NFR BLD AUTO: 59 % (ref 43–75)
NITRITE UR QL STRIP: NEGATIVE
NON-SQ EPI CELLS URNS QL MICRO: ABNORMAL /HPF
NRBC BLD AUTO-RTO: 0 /100 WBCS
PH UR STRIP.AUTO: 5.5 [PH]
PLATELET # BLD AUTO: 313 THOUSANDS/UL (ref 149–390)
PMV BLD AUTO: 10.9 FL (ref 8.9–12.7)
POTASSIUM SERPL-SCNC: 4.5 MMOL/L (ref 3.5–5.3)
PROT SERPL-MCNC: 7.7 G/DL (ref 6.4–8.2)
PROT UR STRIP-MCNC: ABNORMAL MG/DL
RBC # BLD AUTO: 4.93 MILLION/UL (ref 3.81–5.12)
RBC #/AREA URNS AUTO: ABNORMAL /HPF
SODIUM SERPL-SCNC: 139 MMOL/L (ref 136–145)
SP GR UR STRIP.AUTO: >=1.03 (ref 1–1.03)
UROBILINOGEN UR QL STRIP.AUTO: 0.2 E.U./DL
WBC # BLD AUTO: 10.61 THOUSAND/UL (ref 4.31–10.16)
WBC #/AREA URNS AUTO: ABNORMAL /HPF

## 2020-08-12 PROCEDURE — 36415 COLL VENOUS BLD VENIPUNCTURE: CPT | Performed by: EMERGENCY MEDICINE

## 2020-08-12 PROCEDURE — 99284 EMERGENCY DEPT VISIT MOD MDM: CPT

## 2020-08-12 PROCEDURE — 80053 COMPREHEN METABOLIC PANEL: CPT | Performed by: EMERGENCY MEDICINE

## 2020-08-12 PROCEDURE — G1004 CDSM NDSC: HCPCS

## 2020-08-12 PROCEDURE — 99284 EMERGENCY DEPT VISIT MOD MDM: CPT | Performed by: EMERGENCY MEDICINE

## 2020-08-12 PROCEDURE — 85025 COMPLETE CBC W/AUTO DIFF WBC: CPT | Performed by: EMERGENCY MEDICINE

## 2020-08-12 PROCEDURE — 96374 THER/PROPH/DIAG INJ IV PUSH: CPT

## 2020-08-12 PROCEDURE — 87086 URINE CULTURE/COLONY COUNT: CPT | Performed by: EMERGENCY MEDICINE

## 2020-08-12 PROCEDURE — 74177 CT ABD & PELVIS W/CONTRAST: CPT

## 2020-08-12 PROCEDURE — 96375 TX/PRO/DX INJ NEW DRUG ADDON: CPT

## 2020-08-12 PROCEDURE — 81001 URINALYSIS AUTO W/SCOPE: CPT | Performed by: EMERGENCY MEDICINE

## 2020-08-12 PROCEDURE — 83690 ASSAY OF LIPASE: CPT | Performed by: EMERGENCY MEDICINE

## 2020-08-12 RX ORDER — ONDANSETRON 4 MG/1
4 TABLET, FILM COATED ORAL EVERY 8 HOURS PRN
Qty: 12 TABLET | Refills: 0 | Status: SHIPPED | OUTPATIENT
Start: 2020-08-12 | End: 2020-08-20 | Stop reason: ALTCHOICE

## 2020-08-12 RX ORDER — KETOROLAC TROMETHAMINE 30 MG/ML
15 INJECTION, SOLUTION INTRAMUSCULAR; INTRAVENOUS ONCE
Status: COMPLETED | OUTPATIENT
Start: 2020-08-12 | End: 2020-08-12

## 2020-08-12 RX ORDER — ONDANSETRON 4 MG/1
4 TABLET, ORALLY DISINTEGRATING ORAL ONCE
Status: COMPLETED | OUTPATIENT
Start: 2020-08-13 | End: 2020-08-13

## 2020-08-12 RX ORDER — ONDANSETRON 2 MG/ML
4 INJECTION INTRAMUSCULAR; INTRAVENOUS ONCE
Status: COMPLETED | OUTPATIENT
Start: 2020-08-13 | End: 2020-08-13

## 2020-08-12 RX ORDER — OXYCODONE HYDROCHLORIDE AND ACETAMINOPHEN 5; 325 MG/1; MG/1
1 TABLET ORAL ONCE
Status: COMPLETED | OUTPATIENT
Start: 2020-08-13 | End: 2020-08-13

## 2020-08-12 RX ORDER — CEPHALEXIN 500 MG/1
500 CAPSULE ORAL EVERY 6 HOURS SCHEDULED
Qty: 12 CAPSULE | Refills: 0 | Status: SHIPPED | OUTPATIENT
Start: 2020-08-12 | End: 2020-08-15

## 2020-08-12 RX ORDER — ONDANSETRON 2 MG/ML
4 INJECTION INTRAMUSCULAR; INTRAVENOUS ONCE
Status: COMPLETED | OUTPATIENT
Start: 2020-08-12 | End: 2020-08-12

## 2020-08-12 RX ORDER — IBUPROFEN 600 MG/1
600 TABLET ORAL EVERY 6 HOURS PRN
Qty: 30 TABLET | Refills: 0 | Status: SHIPPED | OUTPATIENT
Start: 2020-08-12 | End: 2020-11-27 | Stop reason: ALTCHOICE

## 2020-08-12 RX ORDER — KETOROLAC TROMETHAMINE 30 MG/ML
15 INJECTION, SOLUTION INTRAMUSCULAR; INTRAVENOUS ONCE
Status: COMPLETED | OUTPATIENT
Start: 2020-08-13 | End: 2020-08-13

## 2020-08-12 RX ADMIN — KETOROLAC TROMETHAMINE 15 MG: 30 INJECTION, SOLUTION INTRAMUSCULAR at 20:47

## 2020-08-12 RX ADMIN — ONDANSETRON 4 MG: 2 INJECTION INTRAMUSCULAR; INTRAVENOUS at 20:48

## 2020-08-12 RX ADMIN — IOHEXOL 100 ML: 350 INJECTION, SOLUTION INTRAVENOUS at 22:39

## 2020-08-13 PROCEDURE — 96376 TX/PRO/DX INJ SAME DRUG ADON: CPT

## 2020-08-13 RX ADMIN — OXYCODONE HYDROCHLORIDE AND ACETAMINOPHEN 1 TABLET: 5; 325 TABLET ORAL at 00:15

## 2020-08-13 RX ADMIN — ONDANSETRON 4 MG: 4 TABLET, ORALLY DISINTEGRATING ORAL at 00:15

## 2020-08-13 RX ADMIN — ONDANSETRON 4 MG: 2 INJECTION INTRAMUSCULAR; INTRAVENOUS at 00:15

## 2020-08-13 RX ADMIN — KETOROLAC TROMETHAMINE 15 MG: 30 INJECTION, SOLUTION INTRAMUSCULAR at 00:17

## 2020-08-13 NOTE — ED PROVIDER NOTES
History  Chief Complaint   Patient presents with    Flank Pain     Pt reports L flank pain radiating from her L groin +NV States she feels alot of pressure -difficulty urinating -fevers      27-year-old female comes in for abdominal pain  Patient states she began to have left-sided abdominal pain that radiates into her gayle an groin some nausea and vomiting  Patient reports that her pain actually started in her left lower quadrant  Patient states that she feels like pressure and that she has difficulty urinating  Patient denies any fever or chills chest pain shortness of breath  Patient denies any history of kidney stones or diverticulitis  History provided by:  Spouse and parent   used: No    Flank Pain   Pain location:  LLQ  Pain quality: stabbing and throbbing    Pain radiates to:  L flank and groin  Pain severity:  Severe  Onset quality:  Sudden  Duration:  4 hours  Timing:  Constant  Progression:  Worsening  Chronicity:  New  Context: not alcohol use, not recent illness and not trauma    Ineffective treatments:  None tried  Associated symptoms: nausea    Associated symptoms: no anorexia, no chest pain, no cough, no diarrhea, no fatigue, no fever, no hematuria, no shortness of breath and no vomiting    Risk factors: no alcohol abuse, no NSAID use and not pregnant        Prior to Admission Medications   Prescriptions Last Dose Informant Patient Reported? Taking? LORazepam (ATIVAN) 0 5 mg tablet Not Taking at Unknown time  No No   Sig: Take 1 tablet (0 5 mg total) by mouth daily as needed for anxiety   Patient not taking: Reported on 8/12/2020   NON FORMULARY Past Week at Unknown time  Yes Yes   Sig: CBD cream, applies to B/L knees   Omega-3 Fatty Acids (FISH OIL) 1,000 mg Not Taking at Unknown time  Yes No   Sig: Take 1,000 mg by mouth daily   aspirin 81 MG tablet 8/11/2020 at Unknown time Self Yes Yes   Sig: Take 81 mg by mouth daily     cholecalciferol (VITAMIN D3) 1,000 units tablet 8/11/2020 at Unknown time Self Yes Yes   Sig: Take 1,000 Units by mouth daily    cyanocobalamin (VITAMIN B-12) 1,000 mcg tablet 8/11/2020 at Unknown time Self Yes Yes   Sig: Take 1,000 mcg by mouth daily   diclofenac sodium (VOLTAREN) 1 % Past Week at Unknown time Self No Yes   Sig: Apply 4 g topically 2 (two) times a day as needed (knee pain)   esomeprazole (NexIUM) 20 mg capsule 8/11/2020 at Unknown time Self Yes Yes   Sig: Take 20 mg by mouth 2 (two) times a day    fexofenadine (ALLEGRA) 180 MG tablet More than a month at Unknown time  No No   Sig: Take 1 tablet (180 mg total) by mouth daily   fluticasone (FLONASE) 50 mcg/act nasal spray Not Taking at Unknown time  No No   Sig: SPRAY 1 SPRAY INTO EACH NOSTRIL EVERY DAY   Patient not taking: Reported on 8/12/2020   ibuprofen (MOTRIN) 800 mg tablet 8/11/2020 at Unknown time Self Yes Yes   Sig: TAKE 1 TABLET BY MOUTH THREE TIMES A DAY WITH FOOD AS NEEDED   naproxen sodium (ALEVE) 220 MG tablet 8/11/2020 at Unknown time Self Yes Yes   Sig: Take 220 mg by mouth daily as needed     nystatin (MYCOSTATIN) cream 8/11/2020 at Unknown time  Yes Yes   Sig: APPLY TO THE AFFECTED AREA(S) BY TOPICAL ROUTE 2 TIMES PER DAY FOR 2 WEEKS   nystatin (MYCOSTATIN) powder 8/11/2020 at Unknown time  No Yes   Sig: Apply topically 3 (three) times a day   ondansetron (Zofran ODT) 4 mg disintegrating tablet Not Taking at Unknown time  No No   Sig: Take 2 tablets (8 mg total) by mouth every 8 (eight) hours as needed for nausea or vomiting for up to 20 doses   Patient not taking: Reported on 3/5/2020   terbinafine (LamISIL) 1 % cream Not Taking at Unknown time  No No   Sig: Apply topically 2 (two) times a day   Patient not taking: Reported on 11/26/2019   triamcinolone (KENALOG) 0 1 % cream Not Taking at Unknown time  No No   Sig: Apply topically 2 (two) times a day   Patient not taking: Reported on 8/12/2020      Facility-Administered Medications: None       Past Medical History: Diagnosis Date    Anxiety     Last Assessed: [id number]    Arthritis     Last Assessed: [id number]    Colon polyp     GERD (gastroesophageal reflux disease)     Last Assessed: 31Oct2017    Hypothyroidism, adult 10/31/2017    Insomnia     Osteoporosis     Last Assessed: [id number]    Reflux esophagitis     Seasonal allergies     Urinary incontinence     Last Assessed: 31Oct2017       Past Surgical History:   Procedure Laterality Date    CHOLECYSTECTOMY      COLONOSCOPY      EXPLORATORY LAPAROTOMY      GALLBLADDER SURGERY      Last Assessed: [id number]    INCISIONAL BREAST BIOPSY      Last Assessed: [id number]   Chelsea Leisure JOINT REPLACEMENT      KNEE SURGERY      Last Assessed: [id number]    LA IMPLANT PERIPH/GASTRIC NEUROSTIM/ N/A 3/23/2016    Procedure:  REMOVAL IPG BATTERY ;  Surgeon: Robin Martin MD;  Location: [facility];  Service: UroGynecology           REPLACEMENT TOTAL KNEE Right 04/29/2019    ROOT CANAL  03/11/2019    SHOULDER SURGERY Left     SHOULDER SURGERY      Last Assessed: [id number]    TONSILLECTOMY      last Assessed: 31Oct2017    WISDOM TOOTH EXTRACTION      Last Assessed: 30Oct65       Family History   Problem Relation Age of Onset    Lupus Mother     Other Mother         Cardiac Disorder    Other Father         Cardiac Disorder    Diabetes Father     Breast cancer Maternal Aunt     Breast cancer Cousin      I have reviewed and agree with the history as documented  E-Cigarette/Vaping    E-Cigarette Use Never User      E-Cigarette/Vaping Substances     Social History     Tobacco Use    Smoking status: Never Smoker    Smokeless tobacco: Never Used   Substance Use Topics    Alcohol use: Yes     Frequency: 2-4 times a month     Drinks per session: 1 or 2     Binge frequency: Never     Comment: Social - wine    Drug use: No       Review of Systems   Constitutional: Negative for fatigue and fever  HENT: Negative for congestion and ear pain      Eyes: Negative for discharge and redness  Respiratory: Negative for apnea, cough, shortness of breath and wheezing  Cardiovascular: Negative for chest pain  Gastrointestinal: Positive for nausea  Negative for abdominal pain, anorexia, diarrhea and vomiting  Endocrine: Negative for cold intolerance and polydipsia  Genitourinary: Positive for flank pain  Negative for difficulty urinating and hematuria  Musculoskeletal: Negative for arthralgias and back pain  Skin: Negative for color change and rash  Allergic/Immunologic: Negative for environmental allergies and immunocompromised state  Neurological: Negative for numbness and headaches  Hematological: Negative for adenopathy  Does not bruise/bleed easily  Psychiatric/Behavioral: Negative for agitation and behavioral problems  Physical Exam  Physical Exam  Vitals signs and nursing note reviewed  Constitutional:       General: She is in acute distress  Appearance: Normal appearance  She is well-developed  She is not toxic-appearing  HENT:      Head: Normocephalic and atraumatic  Right Ear: Tympanic membrane and external ear normal       Left Ear: Tympanic membrane and external ear normal       Nose: Nose normal  No nasal deformity or rhinorrhea  Mouth/Throat:      Dentition: Normal dentition  Pharynx: Uvula midline  Eyes:      General: Lids are normal          Right eye: No discharge  Left eye: No discharge  Conjunctiva/sclera: Conjunctivae normal       Pupils: Pupils are equal, round, and reactive to light  Neck:      Musculoskeletal: Normal range of motion and neck supple  Vascular: No carotid bruit or JVD  Trachea: Trachea normal    Cardiovascular:      Rate and Rhythm: Normal rate and regular rhythm  No extrasystoles are present  Chest Wall: PMI is not displaced  Pulses: Normal pulses  Pulmonary:      Effort: Pulmonary effort is normal  No accessory muscle usage or respiratory distress  Breath sounds: Normal breath sounds  No wheezing, rhonchi or rales  Abdominal:      General: Bowel sounds are normal       Palpations: Abdomen is soft  Abdomen is not rigid  There is no mass  Tenderness: There is abdominal tenderness in the left lower quadrant  There is left CVA tenderness  There is no guarding or rebound  Musculoskeletal:      Right shoulder: She exhibits normal range of motion, no bony tenderness, no swelling and no deformity  Cervical back: Normal  She exhibits normal range of motion, no tenderness, no bony tenderness and no deformity  Lymphadenopathy:      Cervical: No cervical adenopathy  Skin:     General: Skin is warm and dry  Findings: No rash  Neurological:      Mental Status: She is alert and oriented to person, place, and time  GCS: GCS eye subscore is 4  GCS verbal subscore is 5  GCS motor subscore is 6  Cranial Nerves: No cranial nerve deficit  Sensory: No sensory deficit  Deep Tendon Reflexes: Reflexes are normal and symmetric     Psychiatric:         Speech: Speech normal          Behavior: Behavior normal          Vital Signs  ED Triage Vitals [08/12/20 2027]   Temperature Pulse Respirations Blood Pressure SpO2   98 5 °F (36 9 °C) 68 18 143/73 99 %      Temp Source Heart Rate Source Patient Position - Orthostatic VS BP Location FiO2 (%)   Oral Monitor Sitting Right arm --      Pain Score       Worst Possible Pain           Vitals:    08/12/20 2027 08/12/20 2051 08/12/20 2322   BP: 143/73 170/71 134/63   Pulse: 68 62 60   Patient Position - Orthostatic VS: Sitting Lying Sitting         Visual Acuity      ED Medications  Medications   oxyCODONE-acetaminophen (PERCOCET) 5-325 mg per tablet 1 tablet (has no administration in time range)   ondansetron (ZOFRAN) injection 4 mg (has no administration in time range)   ondansetron (ZOFRAN-ODT) dispersible tablet 4 mg (has no administration in time range)   ketorolac (TORADOL) injection 15 mg (has no administration in time range)   ondansetron (ZOFRAN) injection 4 mg (4 mg Intravenous Given 8/12/20 2048)   ketorolac (TORADOL) injection 15 mg (15 mg Intravenous Given 8/12/20 2047)   iohexol (OMNIPAQUE) 350 MG/ML injection (MULTI-DOSE) 100 mL (100 mL Intravenous Given 8/12/20 2239)       Diagnostic Studies  Results Reviewed     Procedure Component Value Units Date/Time    Urine Microscopic [[id number]]  (Abnormal) Collected:  08/12/20 2047    Lab Status:  Final result Specimen:  Urine, Clean Catch Updated:  08/12/20 2125     RBC, UA 0-5 /hpf      WBC, UA 10-20 /hpf      Epithelial Cells Occasional /hpf      Bacteria, UA Occasional /hpf      MUCUS THREADS Occasional    Urine culture [[id number]] Collected:  08/12/20 2047    Lab Status:   In process Specimen:  Urine, Clean Catch Updated:  08/12/20 2125    Lipase [[id number]]  (Normal) Collected:  08/12/20 2041    Lab Status:  Final result Specimen:  Blood from Arm, Left Updated:  08/12/20 2109     Lipase 125 u/L     Comprehensive metabolic panel [[id number]]  (Abnormal) Collected:  08/12/20 2041    Lab Status:  Final result Specimen:  Blood from Arm, Left Updated:  08/12/20 2109     Sodium 139 mmol/L      Potassium 4 5 mmol/L      Chloride 104 mmol/L      CO2 27 mmol/L      ANION GAP 8 mmol/L      BUN 18 mg/dL      Creatinine 1 05 mg/dL      Glucose 110 mg/dL      Calcium 9 0 mg/dL      AST 21 U/L      ALT 26 U/L      Alkaline Phosphatase 119 U/L      Total Protein 7 7 g/dL      Albumin 3 8 g/dL      Total Bilirubin 0 29 mg/dL      eGFR 54 ml/min/1 73sq m     Narrative:       [place] guidelines for Chronic Kidney Disease (CKD):     Stage 1 with normal or high GFR (GFR > 90 mL/min/1 73 square meters)    Stage 2 Mild CKD (GFR = 60-89 mL/min/1 73 square meters)    Stage 3A Moderate CKD (GFR = 45-59 mL/min/1 73 square meters)    Stage 3B Moderate CKD (GFR = 30-44 mL/min/1 73 square meters)    Stage 4 Severe CKD (GFR = 15-29 mL/min/1 73 square meters)    Stage 5 End Stage CKD (GFR <15 mL/min/1 73 square meters)  Note: GFR calculation is accurate only with a steady state creatinine    UA w Reflex to Microscopic w Reflex to Culture [[id number]]  (Abnormal) Collected:  08/12/20 2047    Lab Status:  Final result Specimen:  Urine, Clean Catch Updated:  08/12/20 2103     Color, UA Yellow     Clarity, UA Slightly Cloudy     Specific Gravity, UA >=1 030     pH, UA 5 5     Leukocytes, UA Small     Nitrite, UA Negative     Protein, UA Trace mg/dl      Glucose, UA Negative mg/dl      Ketones, UA Negative mg/dl      Urobilinogen, UA 0 2 E U /dl      Bilirubin, UA Negative     Blood, UA Moderate    CBC and differential [[id number]]  (Abnormal) Collected:  08/12/20 2041    Lab Status:  Final result Specimen:  Blood from Arm, Left Updated:  08/12/20 2053     WBC 10 61 Thousand/uL      RBC 4 93 Million/uL      Hemoglobin 13 2 g/dL      Hematocrit 41 5 %      MCV 84 fL      MCH 26 8 pg      MCHC 31 8 g/dL      RDW 14 3 %      MPV 10 9 fL      Platelets 533 Thousands/uL      nRBC 0 /100 WBCs      Neutrophils Relative 59 %      Immat GRANS % 1 %      Lymphocytes Relative 28 %      Monocytes Relative 7 %      Eosinophils Relative 4 %      Basophils Relative 1 %      Neutrophils Absolute 6 32 Thousands/µL      Immature Grans Absolute 0 05 Thousand/uL      Lymphocytes Absolute 3 00 Thousands/µL      Monocytes Absolute 0 71 Thousand/µL      Eosinophils Absolute 0 44 Thousand/µL      Basophils Absolute 0 09 Thousands/µL                  CT abdomen pelvis with contrast   Final Result by Jana Mercedes DO (08/12 1527)      Mild central mesenteric edema with tiny associated lymph nodes similar to prior study  Findings may represent mesenteric panniculitis  Follow-up is recommended            Workstation performed: [id number]                    Procedures  Procedures         ED Course       US AUDIT      Most Recent Value   Initial Alcohol Screen: US AUDIT-C    1   How often do you have a drink containing alcohol?  0 Filed at: 08/12/2020 2026   2  How many drinks containing alcohol do you have on a typical day you are drinking? 0 Filed at: 08/12/2020 2026   3a  Male UNDER 65: How often do you have five or more drinks on one occasion? 0 Filed at: 08/12/2020 2026   3b  FEMALE Any Age, or MALE 65+: How often do you have 4 or more drinks on one occassion? 0 Filed at: 08/12/2020 2026   Audit-C Score  0 Filed at: 08/12/2020 2026                  JOHN/DAST-10      Most Recent Value   How many times in the past year have you    Used an illegal drug or used a prescription medication for non-medical reasons? Never Filed at: 08/12/2020 2026                                MDM  Number of Diagnoses or Management Options  Abdominal pain: new and requires workup  UTI (urinary tract infection): new and requires workup     Amount and/or Complexity of Data Reviewed  Clinical lab tests: ordered and reviewed  Tests in the radiology section of CPT®: ordered and reviewed  Tests in the medicine section of CPT®: ordered and reviewed  Review and summarize past medical records: yes    Risk of Complications, Morbidity, and/or Mortality  General comments: Discussed with patient and significant other results of blood work urine and CT scan  Although the CT scan is inconclusive I am concerned that maybe she passed a small kidney stone due to the amount of microscopic blood in her urine as well as her constellation of flank and groin pain  I will cover her for antibiotics as I am concerned about the amount of white blood cells in her microscopy  I did discuss the findings of mesenteric panniculitis on CT scan  This has been seen before in the past I do not think this is contributing to her abdominal pain today    She does know to follow up with her PCP    Patient Progress  Patient progress: stable        Disposition  Final diagnoses:   Abdominal pain   UTI (urinary tract infection)     Time reflects when diagnosis was documented in both MDM as applicable and the Disposition within this note     Time User Action Codes Description Comment    8/12/2020 11:37 PM Ether Gondola K Add [R10 9] Abdominal pain     8/12/2020 11:37 PM Ether Gondola K Add [N39 0] UTI (urinary tract infection)       ED Disposition     ED Disposition Condition Date/Time Comment    Discharge Stable Wed Aug 12, 2020 11:37 PM Bib Blair discharge to home/self care  Follow-up Information     Follow up With Specialties Details Why Contact Info    Rancho Rowe MD Internal Medicine Schedule an appointment as soon as possible for a visit   [facility]  [phone]            Patient's Medications   Discharge Prescriptions    CEPHALEXIN (KEFLEX) 500 MG CAPSULE    Take 1 capsule (500 mg total) by mouth every 6 (six) hours for 3 days       Start Date: 8/12/2020 End Date: 8/15/2020       Order Dose: 500 mg       Quantity: 12 capsule    Refills: 0    IBUPROFEN (MOTRIN) 600 MG TABLET    Take 1 tablet (600 mg total) by mouth every 6 (six) hours as needed for moderate pain       Start Date: 8/12/2020 End Date: --       Order Dose: 600 mg       Quantity: 30 tablet    Refills: 0    ONDANSETRON (ZOFRAN) 4 MG TABLET    Take 1 tablet (4 mg total) by mouth every 8 (eight) hours as needed for nausea or vomiting       Start Date: 8/12/2020 End Date: --       Order Dose: 4 mg       Quantity: 12 tablet    Refills: 0     No discharge procedures on file      PDMP Review     None          ED Provider  Electronically Signed by           Jill Alexandre DO  08/12/20 0928

## 2020-08-13 NOTE — ED NOTES
Patient returned from [facility]        Tre Solorio  08/12/20 1480

## 2020-08-14 LAB — BACTERIA UR CULT: NORMAL

## 2020-08-20 ENCOUNTER — OFFICE VISIT (OUTPATIENT)
Dept: INTERNAL MEDICINE CLINIC | Age: 70
End: 2020-08-20
Payer: MEDICARE

## 2020-08-20 VITALS
SYSTOLIC BLOOD PRESSURE: 122 MMHG | OXYGEN SATURATION: 97 % | BODY MASS INDEX: 34.36 KG/M2 | DIASTOLIC BLOOD PRESSURE: 70 MMHG | HEART RATE: 70 BPM | HEIGHT: 61 IN | TEMPERATURE: 98.7 F | WEIGHT: 182 LBS

## 2020-08-20 DIAGNOSIS — E03.9 HYPOTHYROIDISM, ADULT: ICD-10-CM

## 2020-08-20 DIAGNOSIS — R31.9 HEMATURIA, UNSPECIFIED TYPE: Primary | ICD-10-CM

## 2020-08-20 DIAGNOSIS — E78.5 HYPERLIPIDEMIA, UNSPECIFIED HYPERLIPIDEMIA TYPE: ICD-10-CM

## 2020-08-20 PROCEDURE — 4040F PNEUMOC VAC/ADMIN/RCVD: CPT | Performed by: PHYSICIAN ASSISTANT

## 2020-08-20 PROCEDURE — 99214 OFFICE O/P EST MOD 30 MIN: CPT | Performed by: PHYSICIAN ASSISTANT

## 2020-08-20 PROCEDURE — 1160F RVW MEDS BY RX/DR IN RCRD: CPT | Performed by: PHYSICIAN ASSISTANT

## 2020-08-20 PROCEDURE — 1036F TOBACCO NON-USER: CPT | Performed by: PHYSICIAN ASSISTANT

## 2020-10-01 ENCOUNTER — OFFICE VISIT (OUTPATIENT)
Dept: INTERNAL MEDICINE CLINIC | Age: 70
End: 2020-10-01
Payer: MEDICARE

## 2020-10-01 VITALS
DIASTOLIC BLOOD PRESSURE: 66 MMHG | WEIGHT: 181.1 LBS | HEART RATE: 70 BPM | SYSTOLIC BLOOD PRESSURE: 124 MMHG | OXYGEN SATURATION: 98 % | HEIGHT: 62 IN | BODY MASS INDEX: 33.33 KG/M2 | TEMPERATURE: 98.7 F

## 2020-10-01 DIAGNOSIS — E03.9 HYPOTHYROIDISM, ADULT: ICD-10-CM

## 2020-10-01 DIAGNOSIS — M25.50 ARTHRALGIA, UNSPECIFIED JOINT: Primary | ICD-10-CM

## 2020-10-01 DIAGNOSIS — E78.5 HYPERLIPIDEMIA, UNSPECIFIED HYPERLIPIDEMIA TYPE: ICD-10-CM

## 2020-10-01 DIAGNOSIS — K21.9 GASTROESOPHAGEAL REFLUX DISEASE WITHOUT ESOPHAGITIS: ICD-10-CM

## 2020-10-01 DIAGNOSIS — R79.89 ABNORMAL LFTS: ICD-10-CM

## 2020-10-01 DIAGNOSIS — Z00.00 ENCOUNTER FOR ANNUAL WELLNESS VISIT (AWV) IN MEDICARE PATIENT: ICD-10-CM

## 2020-10-01 DIAGNOSIS — R19.5 LOOSE STOOLS: ICD-10-CM

## 2020-10-01 PROCEDURE — 99214 OFFICE O/P EST MOD 30 MIN: CPT | Performed by: PHYSICIAN ASSISTANT

## 2020-10-01 PROCEDURE — G0439 PPPS, SUBSEQ VISIT: HCPCS | Performed by: PHYSICIAN ASSISTANT

## 2020-10-14 ENCOUNTER — OFFICE VISIT (OUTPATIENT)
Dept: INTERNAL MEDICINE CLINIC | Age: 70
End: 2020-10-14
Payer: MEDICARE

## 2020-10-14 VITALS
OXYGEN SATURATION: 99 % | SYSTOLIC BLOOD PRESSURE: 134 MMHG | HEIGHT: 62 IN | BODY MASS INDEX: 33.31 KG/M2 | HEART RATE: 57 BPM | WEIGHT: 181 LBS | DIASTOLIC BLOOD PRESSURE: 66 MMHG | TEMPERATURE: 98.5 F

## 2020-10-14 DIAGNOSIS — E03.9 HYPOTHYROIDISM, ADULT: ICD-10-CM

## 2020-10-14 DIAGNOSIS — R07.89 OTHER CHEST PAIN: Primary | ICD-10-CM

## 2020-10-14 DIAGNOSIS — R53.83 FATIGUE, UNSPECIFIED TYPE: ICD-10-CM

## 2020-10-14 DIAGNOSIS — R42 DIZZINESS: ICD-10-CM

## 2020-10-14 DIAGNOSIS — J45.20 MILD INTERMITTENT ASTHMATIC BRONCHITIS WITHOUT COMPLICATION: ICD-10-CM

## 2020-10-14 DIAGNOSIS — M79.10 MYALGIA: ICD-10-CM

## 2020-10-14 PROCEDURE — 99214 OFFICE O/P EST MOD 30 MIN: CPT | Performed by: PHYSICIAN ASSISTANT

## 2020-10-14 RX ORDER — SACCHAROMYCES BOULARDII 250 MG
250 CAPSULE ORAL DAILY
COMMUNITY
End: 2022-01-06

## 2020-10-16 ENCOUNTER — TRANSITIONAL CARE MANAGEMENT (OUTPATIENT)
Dept: INTERNAL MEDICINE CLINIC | Age: 70
End: 2020-10-16

## 2020-10-20 LAB
ALBUMIN SERPL-MCNC: 4.1 G/DL (ref 3.6–5.1)
ALBUMIN/GLOB SERPL: 1.4 (CALC) (ref 1–2.5)
ALP SERPL-CCNC: 109 U/L (ref 37–153)
ALT SERPL-CCNC: 17 U/L (ref 6–29)
AST SERPL-CCNC: 11 U/L (ref 10–35)
BASOPHILS # BLD AUTO: 94 CELLS/UL (ref 0–200)
BASOPHILS NFR BLD AUTO: 1.1 %
BILIRUB SERPL-MCNC: 0.3 MG/DL (ref 0.2–1.2)
BUN SERPL-MCNC: 17 MG/DL (ref 7–25)
BUN/CREAT SERPL: NORMAL (CALC) (ref 6–22)
CALCIUM SERPL-MCNC: 9.5 MG/DL (ref 8.6–10.4)
CHLORIDE SERPL-SCNC: 106 MMOL/L (ref 98–110)
CHOLEST SERPL-MCNC: 223 MG/DL
CHOLEST/HDLC SERPL: 4.4 (CALC)
CO2 SERPL-SCNC: 24 MMOL/L (ref 20–32)
CREAT SERPL-MCNC: 0.72 MG/DL (ref 0.6–0.93)
CRP SERPL-MCNC: 6.3 MG/L
EOSINOPHIL # BLD AUTO: 323 CELLS/UL (ref 15–500)
EOSINOPHIL NFR BLD AUTO: 3.8 %
ERYTHROCYTE [DISTWIDTH] IN BLOOD BY AUTOMATED COUNT: 13.4 % (ref 11–15)
GLOBULIN SER CALC-MCNC: 2.9 G/DL (CALC) (ref 1.9–3.7)
GLUCOSE SERPL-MCNC: 90 MG/DL (ref 65–99)
HCT VFR BLD AUTO: 41.8 % (ref 35–45)
HDLC SERPL-MCNC: 51 MG/DL
HGB BLD-MCNC: 13.4 G/DL (ref 11.7–15.5)
LDLC SERPL CALC-MCNC: 146 MG/DL (CALC)
LYMPHOCYTES # BLD AUTO: 2771 CELLS/UL (ref 850–3900)
LYMPHOCYTES NFR BLD AUTO: 32.6 %
MCH RBC QN AUTO: 26.5 PG (ref 27–33)
MCHC RBC AUTO-ENTMCNC: 32.1 G/DL (ref 32–36)
MCV RBC AUTO: 82.6 FL (ref 80–100)
MONOCYTES # BLD AUTO: 561 CELLS/UL (ref 200–950)
MONOCYTES NFR BLD AUTO: 6.6 %
NEUTROPHILS # BLD AUTO: 4752 CELLS/UL (ref 1500–7800)
NEUTROPHILS NFR BLD AUTO: 55.9 %
NONHDLC SERPL-MCNC: 172 MG/DL (CALC)
PLATELET # BLD AUTO: 360 THOUSAND/UL (ref 140–400)
PMV BLD REES-ECKER: 11.6 FL (ref 7.5–12.5)
POTASSIUM SERPL-SCNC: 4.5 MMOL/L (ref 3.5–5.3)
PROT SERPL-MCNC: 7 G/DL (ref 6.1–8.1)
RBC # BLD AUTO: 5.06 MILLION/UL (ref 3.8–5.1)
RHEUMATOID FACT SERPL-ACNC: <14 IU/ML
SL AMB EGFR AFRICAN AMERICAN: 98 ML/MIN/1.73M2
SL AMB EGFR NON AFRICAN AMERICAN: 84 ML/MIN/1.73M2
SODIUM SERPL-SCNC: 139 MMOL/L (ref 135–146)
TRIGL SERPL-MCNC: 135 MG/DL
TSH SERPL-ACNC: 4.01 MIU/L (ref 0.4–4.5)
WBC # BLD AUTO: 8.5 THOUSAND/UL (ref 3.8–10.8)

## 2020-10-22 ENCOUNTER — TELEMEDICINE (OUTPATIENT)
Dept: INTERNAL MEDICINE CLINIC | Age: 70
End: 2020-10-22
Payer: MEDICARE

## 2020-10-22 DIAGNOSIS — R00.2 PALPITATIONS: ICD-10-CM

## 2020-10-22 DIAGNOSIS — E03.8 SUBCLINICAL HYPOTHYROIDISM: Primary | ICD-10-CM

## 2020-10-22 PROCEDURE — 99213 OFFICE O/P EST LOW 20 MIN: CPT | Performed by: INTERNAL MEDICINE

## 2020-10-22 RX ORDER — LEVOTHYROXINE SODIUM 0.03 MG/1
25 TABLET ORAL DAILY
Qty: 90 TABLET | Refills: 1 | Status: SHIPPED | OUTPATIENT
Start: 2020-10-22 | End: 2021-06-09

## 2020-11-27 DIAGNOSIS — M17.10 PRIMARY LOCALIZED OSTEOARTHROSIS OF LOWER LEG, UNSPECIFIED LATERALITY: ICD-10-CM

## 2020-11-27 DIAGNOSIS — E78.2 MIXED HYPERLIPIDEMIA: Primary | ICD-10-CM

## 2020-11-27 RX ORDER — EZETIMIBE 10 MG/1
TABLET ORAL
Qty: 30 TABLET | Refills: 5 | Status: SHIPPED | OUTPATIENT
Start: 2020-11-27 | End: 2021-05-26

## 2020-11-27 RX ORDER — ROSUVASTATIN CALCIUM 10 MG/1
TABLET, COATED ORAL
Qty: 30 TABLET | Refills: 5 | Status: SHIPPED | OUTPATIENT
Start: 2020-11-27 | End: 2021-11-23 | Stop reason: SDUPTHER

## 2020-12-18 DIAGNOSIS — L29.9 ITCH OF SKIN: ICD-10-CM

## 2020-12-21 RX ORDER — FEXOFENADINE HCL 180 MG/1
TABLET ORAL
Qty: 90 TABLET | Refills: 1 | Status: SHIPPED | OUTPATIENT
Start: 2020-12-21 | End: 2021-07-21

## 2021-01-07 ENCOUNTER — OFFICE VISIT (OUTPATIENT)
Dept: INTERNAL MEDICINE CLINIC | Age: 71
End: 2021-01-07
Payer: MEDICARE

## 2021-01-07 VITALS
HEART RATE: 81 BPM | WEIGHT: 182 LBS | BODY MASS INDEX: 34.36 KG/M2 | HEIGHT: 61 IN | TEMPERATURE: 98.2 F | SYSTOLIC BLOOD PRESSURE: 128 MMHG | DIASTOLIC BLOOD PRESSURE: 68 MMHG | OXYGEN SATURATION: 98 %

## 2021-01-07 DIAGNOSIS — F40.243 FEAR OF FLYING: ICD-10-CM

## 2021-01-07 DIAGNOSIS — M17.10 ARTHRITIS OF KNEE: ICD-10-CM

## 2021-01-07 DIAGNOSIS — R05.3 CHRONIC COUGH: ICD-10-CM

## 2021-01-07 DIAGNOSIS — K21.9 GASTROESOPHAGEAL REFLUX DISEASE WITHOUT ESOPHAGITIS: Primary | ICD-10-CM

## 2021-01-07 DIAGNOSIS — F41.9 ANXIETY: ICD-10-CM

## 2021-01-07 PROCEDURE — 99214 OFFICE O/P EST MOD 30 MIN: CPT | Performed by: PHYSICIAN ASSISTANT

## 2021-01-07 RX ORDER — LORAZEPAM 0.5 MG/1
0.5 TABLET ORAL DAILY PRN
Qty: 30 TABLET | Refills: 0 | Status: SHIPPED | OUTPATIENT
Start: 2021-01-07 | End: 2021-09-22 | Stop reason: SDUPTHER

## 2021-01-07 NOTE — PROGRESS NOTES
Assessment/Plan:         Diagnoses and all orders for this visit:    Gastroesophageal reflux disease without esophagitis  Comments:  continue ppi     Anxiety  -     LORazepam (ATIVAN) 0 5 mg tablet; Take 1 tablet (0 5 mg total) by mouth daily as needed for anxiety    Fear of flying  -     LORazepam (ATIVAN) 0 5 mg tablet; Take 1 tablet (0 5 mg total) by mouth daily as needed for anxiety    Chronic cough  Comments:  since bronchitis dx in 2/20  cxr normal in october 2020  +fexofenadine daily x 1 week, if cough persists consider CT  pt will f/u by phone    Arthritis of knee  Comments:  taking ibuprofen prn     Other orders  -     Multiple Vitamins-Minerals (ZINC PO); Take by mouth  -     Olopatadine HCl (PATADAY OP); Apply to eye          Subjective:      Patient ID: Екатерина Cole is a 69 y o  female  Pt reports ongoing PND and cough which has persisted since last February when she had bronchitis   She states it is intermittent and sometimes takes mucinex to resolve this     Pt had labs done 10/19 and was started on statin at that time  Pt reports she is tolerating this well, no myalgias      The following portions of the patient's history were reviewed and updated as appropriate: allergies, current medications, past family history, past medical history, past social history, past surgical history and problem list     Review of Systems   Constitutional: Negative for chills and fever  HENT: Positive for postnasal drip  Negative for congestion  Respiratory: Positive for cough  Negative for shortness of breath  Cardiovascular: Negative for chest pain  Gastrointestinal: Negative for abdominal pain, constipation, diarrhea and nausea  Genitourinary: Negative for dysuria and flank pain  Musculoskeletal: Positive for arthralgias  Negative for back pain  Skin: Negative for rash  Neurological: Negative for dizziness, light-headedness and headaches     Psychiatric/Behavioral: Negative for sleep disturbance  The patient is not nervous/anxious  Past Medical History:   Diagnosis Date    Anxiety     Last Assessed: [id number]    Arthritis     Last Assessed: [id number]    Colon polyp     GERD (gastroesophageal reflux disease)     Last Assessed: 31Oct2017    Hypothyroidism, adult 10/31/2017    Insomnia     Osteoporosis     Last Assessed: [id number]    Reflux esophagitis     Seasonal allergies     Urinary incontinence     Last Assessed: 31Oct2017         Current Outpatient Medications:     aspirin 81 MG tablet, Take 81 mg by mouth daily  , Disp: , Rfl:     cholecalciferol (VITAMIN D3) 1,000 units tablet, Take 1,000 Units by mouth daily , Disp: , Rfl:     cyanocobalamin (VITAMIN B-12) 1,000 mcg tablet, Take 1,000 mcg by mouth daily, Disp: , Rfl:     diclofenac sodium (VOLTAREN) 1 %, Apply 4 g topically 2 (two) times a day as needed (knee pain), Disp: 1 Tube, Rfl: 1    esomeprazole (NexIUM) 20 mg capsule, Take 20 mg by mouth 2 (two) times a day , Disp: , Rfl:     ezetimibe (ZETIA) 10 mg tablet, daily, Disp: 30 tablet, Rfl: 5    fexofenadine (ALLEGRA) 180 MG tablet, TAKE 1 TABLET BY MOUTH EVERY DAY, Disp: 90 tablet, Rfl: 1    levothyroxine 25 mcg tablet, Take 1 tablet (25 mcg total) by mouth daily, Disp: 90 tablet, Rfl: 1    LORazepam (ATIVAN) 0 5 mg tablet, Take 1 tablet (0 5 mg total) by mouth daily as needed for anxiety, Disp: 30 tablet, Rfl: 0    Multiple Vitamins-Minerals (ZINC PO), Take by mouth, Disp: , Rfl:     naproxen sodium (ALEVE) 220 MG tablet, Take 220 mg by mouth daily as needed  , Disp: , Rfl:     NON FORMULARY, CBD cream, applies to B/L knees, Disp: , Rfl:     nystatin (MYCOSTATIN) cream, APPLY TO THE AFFECTED AREA(S) BY TOPICAL ROUTE 2 TIMES PER DAY FOR 2 WEEKS, Disp: , Rfl: 2    nystatin (MYCOSTATIN) powder, Apply topically 3 (three) times a day, Disp: 120 g, Rfl: 1    Olopatadine HCl (PATADAY OP), Apply to eye, Disp: , Rfl:     rosuvastatin (CRESTOR) 10 MG tablet, Use three times a week, Disp: 30 tablet, Rfl: 5    saccharomyces boulardii (FLORASTOR) 250 mg capsule, Take 250 mg by mouth daily Probiotic one daily, Disp: , Rfl:     terbinafine (LamISIL) 1 % cream, Apply topically 2 (two) times a day, Disp: 30 g, Rfl: 0    triamcinolone (KENALOG) 0 1 % cream, Apply topically 2 (two) times a day, Disp: 45 g, Rfl: 0    fluticasone (FLONASE) 50 mcg/act nasal spray, SPRAY 1 SPRAY INTO EACH NOSTRIL EVERY DAY (Patient not taking: Reported on 1/7/2021), Disp: 16 mL, Rfl: 1    Allergies   Allergen Reactions    Acetazolamide Hives    Cephalexin Itching    Sulfa Antibiotics Rash, Edema and Swelling       Social History   Past Surgical History:   Procedure Laterality Date    CHOLECYSTECTOMY      COLONOSCOPY      EXPLORATORY LAPAROTOMY      GALLBLADDER SURGERY      Last Assessed: [id number]    INCISIONAL BREAST BIOPSY      Last Assessed: [id number]   Valaria Reil JOINT REPLACEMENT      KNEE SURGERY      Last Assessed: [id number]    [place] IMPLANT PERIPH/GASTRIC NEUROSTIM/ N/A 3/23/2016    Procedure:  REMOVAL IPG BATTERY ;  Surgeon: Viktoria Verdin MD;  Location: [facility];  Service: UroGynecology           REPLACEMENT TOTAL KNEE Right 04/29/2019    ROOT CANAL  03/11/2019    SHOULDER SURGERY Left     SHOULDER SURGERY      Last Assessed: [id number]    TONSILLECTOMY      last Assessed: 31Oct2017    WISDOM TOOTH EXTRACTION      Last Assessed: 31Oct2017     Family History   Problem Relation Age of Onset    Lupus Mother     Other Mother         Cardiac Disorder    Other Father         Cardiac Disorder    Diabetes Father     Breast cancer Maternal Aunt     Breast cancer Cousin        Objective:  /68 (BP Location: Left arm, Patient Position: Sitting, Cuff Size: Large)   Pulse 81   Temp 98 2 °F (36 8 °C) (Temporal)   Ht 5' 1 42" (1 56 m)   Wt 82 6 kg (182 lb)   SpO2 98%   BMI 33 92 kg/m²        Physical Exam  Vitals signs reviewed     Constitutional:       General: She is not in acute distress  HENT:      Head: Normocephalic and atraumatic  Nose: Nose normal    Eyes:      General:         Right eye: No discharge  Left eye: No discharge  Extraocular Movements: Extraocular movements intact  Conjunctiva/sclera: Conjunctivae normal       Pupils: Pupils are equal, round, and reactive to light  Cardiovascular:      Rate and Rhythm: Normal rate and regular rhythm  Pulses: Normal pulses  Heart sounds: No murmur  Pulmonary:      Effort: Pulmonary effort is normal       Breath sounds: No wheezing, rhonchi or rales  Abdominal:      General: Bowel sounds are normal  There is no distension  Skin:     Findings: No rash  Neurological:      General: No focal deficit present  Mental Status: She is alert and oriented to person, place, and time     Psychiatric:         Mood and Affect: Mood normal          Behavior: Behavior normal

## 2021-01-21 DIAGNOSIS — M17.10 PRIMARY LOCALIZED OSTEOARTHROSIS OF LOWER LEG, UNSPECIFIED LATERALITY: ICD-10-CM

## 2021-02-13 DIAGNOSIS — Z23 ENCOUNTER FOR IMMUNIZATION: ICD-10-CM

## 2021-02-15 ENCOUNTER — IMMUNIZATIONS (OUTPATIENT)
Dept: FAMILY MEDICINE CLINIC | Facility: HOSPITAL | Age: 71
End: 2021-02-15

## 2021-02-15 DIAGNOSIS — Z23 ENCOUNTER FOR IMMUNIZATION: Primary | ICD-10-CM

## 2021-02-15 PROCEDURE — 0001A SARS-COV-2 / COVID-19 MRNA VACCINE (PFIZER-BIONTECH) 30 MCG: CPT | Performed by: PHYSICIAN ASSISTANT

## 2021-02-15 PROCEDURE — 91300 SARS-COV-2 / COVID-19 MRNA VACCINE (PFIZER-BIONTECH) 30 MCG: CPT | Performed by: PHYSICIAN ASSISTANT

## 2021-03-08 ENCOUNTER — IMMUNIZATIONS (OUTPATIENT)
Dept: FAMILY MEDICINE CLINIC | Facility: HOSPITAL | Age: 71
End: 2021-03-08

## 2021-03-08 DIAGNOSIS — Z23 ENCOUNTER FOR IMMUNIZATION: Primary | ICD-10-CM

## 2021-03-08 PROCEDURE — 0002A SARS-COV-2 / COVID-19 MRNA VACCINE (PFIZER-BIONTECH) 30 MCG: CPT

## 2021-03-08 PROCEDURE — 91300 SARS-COV-2 / COVID-19 MRNA VACCINE (PFIZER-BIONTECH) 30 MCG: CPT

## 2021-04-11 DIAGNOSIS — E03.8 SUBCLINICAL HYPOTHYROIDISM: ICD-10-CM

## 2021-04-11 DIAGNOSIS — M17.10 PRIMARY LOCALIZED OSTEOARTHROSIS OF LOWER LEG, UNSPECIFIED LATERALITY: ICD-10-CM

## 2021-04-13 RX ORDER — LEVOTHYROXINE SODIUM 0.03 MG/1
TABLET ORAL
Qty: 90 TABLET | Refills: 1 | OUTPATIENT
Start: 2021-04-13

## 2021-05-19 LAB
ALT SERPL-CCNC: 18 U/L (ref 6–29)
CHOLEST SERPL-MCNC: 163 MG/DL
CHOLEST/HDLC SERPL: 2.7 (CALC)
HDLC SERPL-MCNC: 60 MG/DL
LDLC SERPL CALC-MCNC: 80 MG/DL (CALC)
NONHDLC SERPL-MCNC: 103 MG/DL (CALC)
TRIGL SERPL-MCNC: 133 MG/DL

## 2021-05-24 DIAGNOSIS — E78.2 MIXED HYPERLIPIDEMIA: ICD-10-CM

## 2021-05-26 RX ORDER — EZETIMIBE 10 MG/1
TABLET ORAL
Qty: 30 TABLET | Refills: 5 | Status: SHIPPED | OUTPATIENT
Start: 2021-05-26 | End: 2021-10-06

## 2021-06-01 DIAGNOSIS — B49 FUNGAL INFECTION: ICD-10-CM

## 2021-06-02 RX ORDER — NYSTATIN 100000 [USP'U]/G
POWDER TOPICAL
Qty: 120 G | Refills: 1 | OUTPATIENT
Start: 2021-06-02

## 2021-06-07 DIAGNOSIS — M17.10 PRIMARY LOCALIZED OSTEOARTHROSIS OF LOWER LEG, UNSPECIFIED LATERALITY: ICD-10-CM

## 2021-06-07 DIAGNOSIS — E03.8 SUBCLINICAL HYPOTHYROIDISM: ICD-10-CM

## 2021-06-09 ENCOUNTER — OFFICE VISIT (OUTPATIENT)
Dept: INTERNAL MEDICINE CLINIC | Age: 71
End: 2021-06-09
Payer: MEDICARE

## 2021-06-09 VITALS
TEMPERATURE: 98.1 F | BODY MASS INDEX: 34.04 KG/M2 | DIASTOLIC BLOOD PRESSURE: 80 MMHG | WEIGHT: 185 LBS | HEART RATE: 75 BPM | OXYGEN SATURATION: 98 % | SYSTOLIC BLOOD PRESSURE: 142 MMHG | HEIGHT: 62 IN

## 2021-06-09 DIAGNOSIS — M19.90 ARTHRITIS: ICD-10-CM

## 2021-06-09 DIAGNOSIS — E78.5 HYPERLIPIDEMIA, UNSPECIFIED HYPERLIPIDEMIA TYPE: ICD-10-CM

## 2021-06-09 DIAGNOSIS — R30.9 PAINFUL URINATION: ICD-10-CM

## 2021-06-09 DIAGNOSIS — A49.9 BACTERIAL URINARY INFECTION: ICD-10-CM

## 2021-06-09 DIAGNOSIS — R10.30 LOWER ABDOMINAL PAIN: ICD-10-CM

## 2021-06-09 DIAGNOSIS — E78.2 MIXED HYPERLIPIDEMIA: ICD-10-CM

## 2021-06-09 DIAGNOSIS — E66.9 CLASS 1 OBESITY WITHOUT SERIOUS COMORBIDITY WITH BODY MASS INDEX (BMI) OF 33.0 TO 33.9 IN ADULT, UNSPECIFIED OBESITY TYPE: Primary | ICD-10-CM

## 2021-06-09 DIAGNOSIS — E03.9 HYPOTHYROIDISM, UNSPECIFIED TYPE: ICD-10-CM

## 2021-06-09 DIAGNOSIS — N39.0 BACTERIAL URINARY INFECTION: ICD-10-CM

## 2021-06-09 DIAGNOSIS — I10 ESSENTIAL HYPERTENSION: ICD-10-CM

## 2021-06-09 DIAGNOSIS — M17.10 ARTHRITIS OF KNEE: ICD-10-CM

## 2021-06-09 DIAGNOSIS — N39.0 URINARY TRACT INFECTION WITHOUT HEMATURIA, SITE UNSPECIFIED: ICD-10-CM

## 2021-06-09 LAB
BACTERIA UR QL AUTO: ABNORMAL /HPF
BILIRUB UR QL STRIP: NEGATIVE
CLARITY UR: CLEAR
COLOR UR: YELLOW
GLUCOSE UR STRIP-MCNC: NEGATIVE MG/DL
HGB UR QL STRIP.AUTO: NEGATIVE
HYALINE CASTS #/AREA URNS LPF: ABNORMAL /LPF
KETONES UR STRIP-MCNC: NEGATIVE MG/DL
LEUKOCYTE ESTERASE UR QL STRIP: ABNORMAL
NITRITE UR QL STRIP: NEGATIVE
NON-SQ EPI CELLS URNS QL MICRO: ABNORMAL /HPF
PH UR STRIP.AUTO: 6 [PH]
PROT UR STRIP-MCNC: NEGATIVE MG/DL
RBC #/AREA URNS AUTO: ABNORMAL /HPF
SL AMB  POCT GLUCOSE, UA: NEGATIVE
SL AMB LEUKOCYTE ESTERASE,UA: ABNORMAL
SL AMB POCT BILIRUBIN,UA: NEGATIVE
SL AMB POCT BLOOD,UA: NEGATIVE
SL AMB POCT CLARITY,UA: CLEAR
SL AMB POCT COLOR,UA: YELLOW
SL AMB POCT KETONES,UA: NEGATIVE
SL AMB POCT NITRITE,UA: NEGATIVE
SL AMB POCT PH,UA: 6
SL AMB POCT SPECIFIC GRAVITY,UA: >1.03
SL AMB POCT URINE PROTEIN: NEGATIVE
SL AMB POCT UROBILINOGEN: 0.2
SP GR UR STRIP.AUTO: 1.03 (ref 1–1.03)
UROBILINOGEN UR QL STRIP.AUTO: 0.2 E.U./DL
WBC #/AREA URNS AUTO: ABNORMAL /HPF

## 2021-06-09 PROCEDURE — 99214 OFFICE O/P EST MOD 30 MIN: CPT | Performed by: INTERNAL MEDICINE

## 2021-06-09 PROCEDURE — 81002 URINALYSIS NONAUTO W/O SCOPE: CPT | Performed by: INTERNAL MEDICINE

## 2021-06-09 PROCEDURE — 87086 URINE CULTURE/COLONY COUNT: CPT | Performed by: INTERNAL MEDICINE

## 2021-06-09 PROCEDURE — 81001 URINALYSIS AUTO W/SCOPE: CPT | Performed by: INTERNAL MEDICINE

## 2021-06-09 RX ORDER — B-COMPLEX WITH VITAMIN C
TABLET ORAL
COMMUNITY
Start: 2021-01-03

## 2021-06-09 RX ORDER — LEVOTHYROXINE SODIUM 0.03 MG/1
TABLET ORAL
Qty: 90 TABLET | Refills: 1 | Status: SHIPPED | OUTPATIENT
Start: 2021-06-09 | End: 2021-12-17 | Stop reason: SDUPTHER

## 2021-06-09 RX ORDER — SULFAMETHOXAZOLE AND TRIMETHOPRIM 800; 160 MG/1; MG/1
1 TABLET ORAL EVERY 12 HOURS SCHEDULED
Status: DISCONTINUED | OUTPATIENT
Start: 2021-06-09 | End: 2021-06-09

## 2021-06-09 RX ORDER — SULFAMETHOXAZOLE AND TRIMETHOPRIM 800; 160 MG/1; MG/1
1 TABLET ORAL EVERY 12 HOURS SCHEDULED
Qty: 14 TABLET | Refills: 0 | Status: SHIPPED | OUTPATIENT
Start: 2021-06-09 | End: 2021-06-10

## 2021-06-09 NOTE — PROGRESS NOTES
Assessment/Plan:    No problem-specific Assessment & Plan notes found for this encounter  Diagnoses and all orders for this visit:    Class 1 obesity without serious comorbidity with body mass index (BMI) of 33 0 to 33 9 in adult, unspecified obesity type    Essential hypertension    Hypothyroidism, unspecified type  -     TSH, 3rd generation with Free T4 reflex; Future    Painful urination  -     POCT urine dip  -     sulfamethoxazole-trimethoprim (BACTRIM DS) 800-160 mg per tablet 1 tablet    Urinary tract infection without hematuria, site unspecified  -     UA w Reflex to Microscopic w Reflex to Culture -Lab Collect  -     Urine culture; Future  -     sulfamethoxazole-trimethoprim (BACTRIM DS) 800-160 mg per tablet 1 tablet    Bacterial urinary infection  -     Urine culture; Future  -     sulfamethoxazole-trimethoprim (BACTRIM DS) 800-160 mg per tablet 1 tablet    Arthritis  -     Sedimentation rate, automated; Future  -     Rheumatoid Arthritis Factor; Future    Arthritis of knee    Lower abdominal pain    Hyperlipidemia, unspecified hyperlipidemia type      urinary tract infection to Rx with Bactrim double strength twice a day  A pill that she has been on in the past   If the urine does not clear then she will need to see a urologist   We will be very careful with the urinalysis is if it only shows leukocytes and her culture is okay we will treat her for 7 days and she should be taking a lot of fluids during this time  Subjective:      Patient ID: Nishi Patterson is a 66 y o  female  HPI    The following portions of the patient's history were reviewed and updated as appropriate: She  has a past medical history of Anxiety, Arthritis, Colon polyp, GERD (gastroesophageal reflux disease), Hypothyroidism, adult (10/31/2017), Insomnia, Osteoporosis, Reflux esophagitis, Seasonal allergies, and Urinary incontinence     The patient notes that the she has increased amount of stools    And on most days she has twice a day sign some Syme's 3 bowel movements  They are soft but not diarrheal   She was told in the past to try probiotics and this is a reasonable thing if it does not correct than a colonoscopy regular colonoscopy should be performed  I suggested that she takes align twice a day  The patient was also told to decrease somewhat her leafy vegetables and her roughage in order to decrease the number of bowel movements  And to increase her starches somewhat brown rice peeled apple banana are okay  Review of Systems   Constitutional: Negative for activity change, appetite change, chills, diaphoresis, fatigue, fever and unexpected weight change  HENT: Negative for congestion, facial swelling, hearing loss, mouth sores, nosebleeds, postnasal drip, rhinorrhea and sinus pain  Eyes: Negative  Respiratory: Negative for cough, choking, chest tightness, shortness of breath, wheezing and stridor  Cardiovascular: Negative for chest pain, palpitations and leg swelling  Gastrointestinal: Negative for abdominal distention, abdominal pain and anal bleeding  Genitourinary: Positive for frequency and urgency  Negative for difficulty urinating  Musculoskeletal: Positive for arthralgias and back pain  Negative for gait problem  Neurological: Negative for dizziness, tremors, seizures, syncope, weakness, light-headedness, numbness and headaches  Psychiatric/Behavioral: Negative for agitation, behavioral problems, confusion, decreased concentration, dysphoric mood, hallucinations, self-injury, sleep disturbance and suicidal ideas  The patient is not nervous/anxious and is not hyperactive            Objective:      /80 (BP Location: Left arm, Patient Position: Sitting, Cuff Size: Standard)   Pulse 75   Temp 98 1 °F (36 7 °C) (Temporal)   Ht 5' 2" (1 575 m)   Wt 83 9 kg (185 lb)   SpO2 98%   Breastfeeding Yes   BMI 33 84 kg/m²          Physical Exam  Constitutional:       Appearance: Normal appearance  She is not ill-appearing, toxic-appearing or diaphoretic  HENT:      Head: Normocephalic and atraumatic  Right Ear: There is no impacted cerumen  Left Ear: There is no impacted cerumen  Nose: No congestion or rhinorrhea  Mouth/Throat:      Mouth: Mucous membranes are dry  Pharynx: No oropharyngeal exudate or posterior oropharyngeal erythema  Neck:      Musculoskeletal: No neck rigidity or muscular tenderness  Vascular: No carotid bruit  Cardiovascular:      Rate and Rhythm: Regular rhythm  Tachycardia present  Heart sounds: No murmur  No friction rub  No gallop  Pulmonary:      Effort: Pulmonary effort is normal  No respiratory distress  Breath sounds: Normal breath sounds  No stridor  No wheezing, rhonchi or rales  Chest:      Chest wall: No tenderness  Abdominal:      General: Abdomen is flat  There is no distension  Palpations: Abdomen is soft  There is no mass  Tenderness: There is no abdominal tenderness  There is no right CVA tenderness, left CVA tenderness, guarding or rebound  Hernia: No hernia is present  Musculoskeletal:         General: No swelling, tenderness, deformity or signs of injury  Right lower leg: No edema  Left lower leg: No edema  Lymphadenopathy:      Cervical: No cervical adenopathy  Skin:     Coloration: Skin is not jaundiced or pale  Findings: No bruising, erythema, lesion or rash  Neurological:      General: No focal deficit present  Mental Status: She is alert  She is disoriented  Cranial Nerves: No cranial nerve deficit  Sensory: No sensory deficit     Psychiatric:         Mood and Affect: Mood normal          Behavior: Behavior normal

## 2021-06-09 NOTE — PATIENT INSTRUCTIONS
Continue meds for cholesterol numbers are now excellent  Start actrin for UTI  Return for urine analysis in 1 week    Return for visit in 3 [facility]

## 2021-06-09 NOTE — PROGRESS NOTES
This case of a 59-year-old white female who presents with burning and difficulty urination for approximately 1 and half weeks there is no visible blood in the urine  Occasional flank pain no fever chills urinalysis showed small amount of leukocytes otherwise negative there was no blood and no bacteria  The patient otherwise feels approximately the same however her arthritis is worse and she relates this to her age  But she is still very active she is relative her and does much walking and as tolerant of that  We discussed the possibility of her seeing a rheumatologist and I will keep this open for her if she wishes to she does see a physical therapist on a regular basis

## 2021-06-10 DIAGNOSIS — N39.0 URINARY TRACT INFECTION WITHOUT HEMATURIA, SITE UNSPECIFIED: Primary | ICD-10-CM

## 2021-06-10 DIAGNOSIS — B49 FUNGAL INFECTION: ICD-10-CM

## 2021-06-10 LAB — BACTERIA UR CULT: NORMAL

## 2021-06-10 RX ORDER — CIPROFLOXACIN 500 MG/1
500 TABLET, FILM COATED ORAL EVERY 12 HOURS SCHEDULED
Qty: 10 TABLET | Refills: 0 | Status: SHIPPED | OUTPATIENT
Start: 2021-06-10 | End: 2021-06-15

## 2021-06-10 RX ORDER — NYSTATIN 100000 [USP'U]/G
POWDER TOPICAL
Qty: 120 G | Refills: 1 | Status: SHIPPED | OUTPATIENT
Start: 2021-06-10 | End: 2021-09-22 | Stop reason: SDUPTHER

## 2021-06-10 NOTE — TELEPHONE ENCOUNTER
Medication that was sent in to pharmacy for the patient for her UTI yesterday needs to be changed she has an allergy to Sulfa     Please send in new medication       the patient is also asking for refill of Nystatin Powder

## 2021-06-23 LAB
APPEARANCE UR: CLEAR
BACTERIA UR QL AUTO: NORMAL /HPF
BILIRUB UR QL STRIP: NEGATIVE
COLOR UR: YELLOW
ERYTHROCYTE [SEDIMENTATION RATE] IN BLOOD BY WESTERGREN METHOD: 14 MM/H
GLUCOSE UR QL STRIP: NEGATIVE
HGB UR QL STRIP: NEGATIVE
HYALINE CASTS #/AREA URNS LPF: NORMAL /LPF
KETONES UR QL STRIP: NEGATIVE
LEUKOCYTE ESTERASE UR QL STRIP: NEGATIVE
NITRITE UR QL STRIP: NEGATIVE
PH UR STRIP: 5.5 [PH] (ref 5–8)
PROT UR QL STRIP: NEGATIVE
RBC #/AREA URNS HPF: NORMAL /HPF
RHEUMATOID FACT SERPL-ACNC: <14 IU/ML
SP GR UR STRIP: 1.03 (ref 1–1.03)
SQUAMOUS #/AREA URNS HPF: NORMAL /HPF
TSH SERPL-ACNC: 2.5 MIU/L (ref 0.4–4.5)
WBC #/AREA URNS HPF: NORMAL /HPF

## 2021-07-18 DIAGNOSIS — L29.9 ITCH OF SKIN: ICD-10-CM

## 2021-07-21 RX ORDER — FEXOFENADINE HCL 180 MG/1
TABLET ORAL
Qty: 90 TABLET | Refills: 1 | Status: SHIPPED | OUTPATIENT
Start: 2021-07-21 | End: 2022-01-06

## 2021-09-22 ENCOUNTER — TELEPHONE (OUTPATIENT)
Dept: GASTROENTEROLOGY | Facility: CLINIC | Age: 71
End: 2021-09-22

## 2021-09-22 ENCOUNTER — OFFICE VISIT (OUTPATIENT)
Dept: INTERNAL MEDICINE CLINIC | Age: 71
End: 2021-09-22
Payer: MEDICARE

## 2021-09-22 VITALS
SYSTOLIC BLOOD PRESSURE: 140 MMHG | OXYGEN SATURATION: 98 % | WEIGHT: 190.2 LBS | DIASTOLIC BLOOD PRESSURE: 70 MMHG | HEART RATE: 67 BPM | BODY MASS INDEX: 35 KG/M2 | TEMPERATURE: 98.3 F | HEIGHT: 62 IN

## 2021-09-22 DIAGNOSIS — F40.243 FEAR OF FLYING: ICD-10-CM

## 2021-09-22 DIAGNOSIS — I47.1 PAROXYSMAL ATRIAL TACHYCARDIA (HCC): ICD-10-CM

## 2021-09-22 DIAGNOSIS — B49 FUNGAL INFECTION: ICD-10-CM

## 2021-09-22 DIAGNOSIS — R13.19 ESOPHAGEAL DYSPHAGIA: ICD-10-CM

## 2021-09-22 DIAGNOSIS — E66.01 OBESITY, MORBID (HCC): ICD-10-CM

## 2021-09-22 DIAGNOSIS — F41.9 ANXIETY: ICD-10-CM

## 2021-09-22 DIAGNOSIS — R11.2 NAUSEA AND VOMITING, INTRACTABILITY OF VOMITING NOT SPECIFIED, UNSPECIFIED VOMITING TYPE: Primary | ICD-10-CM

## 2021-09-22 PROCEDURE — 99213 OFFICE O/P EST LOW 20 MIN: CPT | Performed by: INTERNAL MEDICINE

## 2021-09-22 NOTE — TELEPHONE ENCOUNTER
Patients GI provider:  Dr Diana Cueto    Number to return call: [phone]    Reason for call: Dr Conan Boxer called requesting to speak with Dr Diana Cueto regarding Roberto Hire, pt  Please return call to the num above       Scheduled procedure/appointment date if applicable: N/A

## 2021-09-23 PROBLEM — I47.1 PAROXYSMAL ATRIAL TACHYCARDIA (HCC): Status: ACTIVE | Noted: 2021-09-23

## 2021-09-23 PROBLEM — E66.01 OBESITY, MORBID (HCC): Status: ACTIVE | Noted: 2021-09-23

## 2021-09-23 PROBLEM — I47.19 PAROXYSMAL ATRIAL TACHYCARDIA: Status: ACTIVE | Noted: 2021-09-23

## 2021-09-23 RX ORDER — NYSTATIN 100000 [USP'U]/G
POWDER TOPICAL 2 TIMES DAILY
Qty: 120 G | Refills: 1 | Status: SHIPPED | OUTPATIENT
Start: 2021-09-23 | End: 2022-04-18 | Stop reason: SDUPTHER

## 2021-09-23 RX ORDER — LORAZEPAM 0.5 MG/1
0.5 TABLET ORAL DAILY PRN
Qty: 30 TABLET | Refills: 0 | Status: SHIPPED | OUTPATIENT
Start: 2021-09-23 | End: 2022-03-23 | Stop reason: SDUPTHER

## 2021-10-06 ENCOUNTER — OFFICE VISIT (OUTPATIENT)
Dept: INTERNAL MEDICINE CLINIC | Age: 71
End: 2021-10-06
Payer: MEDICARE

## 2021-10-06 VITALS
OXYGEN SATURATION: 98 % | TEMPERATURE: 98.4 F | BODY MASS INDEX: 35.51 KG/M2 | SYSTOLIC BLOOD PRESSURE: 140 MMHG | HEIGHT: 62 IN | WEIGHT: 193 LBS | DIASTOLIC BLOOD PRESSURE: 74 MMHG | HEART RATE: 75 BPM

## 2021-10-06 DIAGNOSIS — H61.23 BILATERAL IMPACTED CERUMEN: ICD-10-CM

## 2021-10-06 DIAGNOSIS — I47.1 PAROXYSMAL ATRIAL TACHYCARDIA (HCC): Primary | ICD-10-CM

## 2021-10-06 DIAGNOSIS — E53.8 VITAMIN B12 DEFICIENCY: ICD-10-CM

## 2021-10-06 DIAGNOSIS — R06.00 PND (PAROXYSMAL NOCTURNAL DYSPNEA): ICD-10-CM

## 2021-10-06 DIAGNOSIS — Z00.00 ENCOUNTER FOR MEDICARE ANNUAL WELLNESS EXAM: ICD-10-CM

## 2021-10-06 DIAGNOSIS — E78.2 MIXED HYPERLIPIDEMIA: ICD-10-CM

## 2021-10-06 DIAGNOSIS — Z23 NEED FOR INFLUENZA VACCINATION: ICD-10-CM

## 2021-10-06 DIAGNOSIS — G62.89 OTHER POLYNEUROPATHY: ICD-10-CM

## 2021-10-06 DIAGNOSIS — I10 ESSENTIAL HYPERTENSION: ICD-10-CM

## 2021-10-06 DIAGNOSIS — R73.01 IMPAIRED FASTING GLUCOSE: ICD-10-CM

## 2021-10-06 DIAGNOSIS — R53.83 FATIGUE, UNSPECIFIED TYPE: ICD-10-CM

## 2021-10-06 DIAGNOSIS — E03.9 HYPOTHYROIDISM, UNSPECIFIED TYPE: ICD-10-CM

## 2021-10-06 PROCEDURE — G0439 PPPS, SUBSEQ VISIT: HCPCS | Performed by: PHYSICIAN ASSISTANT

## 2021-10-06 PROCEDURE — G0008 ADMIN INFLUENZA VIRUS VAC: HCPCS

## 2021-10-06 PROCEDURE — 99214 OFFICE O/P EST MOD 30 MIN: CPT | Performed by: PHYSICIAN ASSISTANT

## 2021-10-06 PROCEDURE — 90662 IIV NO PRSV INCREASED AG IM: CPT

## 2021-10-06 PROCEDURE — 69210 REMOVE IMPACTED EAR WAX UNI: CPT | Performed by: PHYSICIAN ASSISTANT

## 2021-10-06 PROCEDURE — 1123F ACP DISCUSS/DSCN MKR DOCD: CPT | Performed by: PHYSICIAN ASSISTANT

## 2021-10-06 RX ORDER — FLUTICASONE PROPIONATE 50 MCG
1 SPRAY, SUSPENSION (ML) NASAL DAILY
Qty: 16 ML | Refills: 5 | Status: SHIPPED | OUTPATIENT
Start: 2021-10-06

## 2021-10-06 RX ORDER — EZETIMIBE 10 MG/1
TABLET ORAL
Qty: 90 TABLET | Refills: 1 | Status: SHIPPED | OUTPATIENT
Start: 2021-10-06 | End: 2022-01-14 | Stop reason: SDUPTHER

## 2021-10-06 RX ORDER — GABAPENTIN 100 MG/1
100 CAPSULE ORAL
Qty: 30 CAPSULE | Refills: 1 | Status: SHIPPED | OUTPATIENT
Start: 2021-10-06 | End: 2021-12-31 | Stop reason: SDUPTHER

## 2021-10-18 ENCOUNTER — TELEPHONE (OUTPATIENT)
Dept: INTERNAL MEDICINE CLINIC | Age: 71
End: 2021-10-18

## 2021-10-19 ENCOUNTER — TELEMEDICINE (OUTPATIENT)
Dept: INTERNAL MEDICINE CLINIC | Age: 71
End: 2021-10-19
Payer: MEDICARE

## 2021-10-19 VITALS
SYSTOLIC BLOOD PRESSURE: 149 MMHG | TEMPERATURE: 98.4 F | DIASTOLIC BLOOD PRESSURE: 94 MMHG | BODY MASS INDEX: 33.46 KG/M2 | WEIGHT: 180 LBS

## 2021-10-19 DIAGNOSIS — J40 BRONCHITIS: Primary | ICD-10-CM

## 2021-10-19 PROCEDURE — 99213 OFFICE O/P EST LOW 20 MIN: CPT | Performed by: STUDENT IN AN ORGANIZED HEALTH CARE EDUCATION/TRAINING PROGRAM

## 2021-10-19 RX ORDER — AZITHROMYCIN 250 MG/1
TABLET, FILM COATED ORAL
Qty: 6 TABLET | Refills: 0 | Status: SHIPPED | OUTPATIENT
Start: 2021-10-19 | End: 2021-10-24

## 2021-10-21 ENCOUNTER — TELEMEDICINE (OUTPATIENT)
Dept: INTERNAL MEDICINE CLINIC | Age: 71
End: 2021-10-21
Payer: MEDICARE

## 2021-10-21 VITALS — TEMPERATURE: 98.2 F | WEIGHT: 180 LBS | BODY MASS INDEX: 33.46 KG/M2

## 2021-10-21 DIAGNOSIS — R03.0 ELEVATED BP WITHOUT DIAGNOSIS OF HYPERTENSION: ICD-10-CM

## 2021-10-21 DIAGNOSIS — Z20.822 EXPOSURE TO COVID-19 VIRUS: Primary | ICD-10-CM

## 2021-10-21 DIAGNOSIS — J40 BRONCHITIS: ICD-10-CM

## 2021-10-21 PROCEDURE — 99213 OFFICE O/P EST LOW 20 MIN: CPT | Performed by: PHYSICIAN ASSISTANT

## 2021-10-27 ENCOUNTER — TELEMEDICINE (OUTPATIENT)
Dept: INTERNAL MEDICINE CLINIC | Age: 71
End: 2021-10-27
Payer: MEDICARE

## 2021-10-27 VITALS — HEIGHT: 62 IN | BODY MASS INDEX: 33.13 KG/M2 | TEMPERATURE: 97.9 F | WEIGHT: 180 LBS

## 2021-10-27 DIAGNOSIS — Z12.31 SCREENING MAMMOGRAM, ENCOUNTER FOR: Primary | ICD-10-CM

## 2021-10-27 PROCEDURE — 99213 OFFICE O/P EST LOW 20 MIN: CPT | Performed by: INTERNAL MEDICINE

## 2021-11-08 PROCEDURE — U0003 INFECTIOUS AGENT DETECTION BY NUCLEIC ACID (DNA OR RNA); SEVERE ACUTE RESPIRATORY SYNDROME CORONAVIRUS 2 (SARS-COV-2) (CORONAVIRUS DISEASE [COVID-19]), AMPLIFIED PROBE TECHNIQUE, MAKING USE OF HIGH THROUGHPUT TECHNOLOGIES AS DESCRIBED BY CMS-2020-01-R: HCPCS | Performed by: PHYSICIAN ASSISTANT

## 2021-11-08 PROCEDURE — U0005 INFEC AGEN DETEC AMPLI PROBE: HCPCS | Performed by: PHYSICIAN ASSISTANT

## 2021-11-23 DIAGNOSIS — E78.2 MIXED HYPERLIPIDEMIA: ICD-10-CM

## 2021-11-23 RX ORDER — ROSUVASTATIN CALCIUM 10 MG/1
10 TABLET, COATED ORAL 3 TIMES WEEKLY
Qty: 36 TABLET | Refills: 3 | Status: SHIPPED | OUTPATIENT
Start: 2021-11-24

## 2021-11-26 ENCOUNTER — TELEPHONE (OUTPATIENT)
Dept: GASTROENTEROLOGY | Facility: CLINIC | Age: 71
End: 2021-11-26

## 2021-11-26 ENCOUNTER — OFFICE VISIT (OUTPATIENT)
Dept: GASTROENTEROLOGY | Facility: AMBULARY SURGERY CENTER | Age: 71
End: 2021-11-26
Payer: MEDICARE

## 2021-11-26 VITALS
SYSTOLIC BLOOD PRESSURE: 150 MMHG | HEIGHT: 62 IN | BODY MASS INDEX: 35.33 KG/M2 | DIASTOLIC BLOOD PRESSURE: 90 MMHG | WEIGHT: 192 LBS

## 2021-11-26 DIAGNOSIS — K21.9 GASTROESOPHAGEAL REFLUX DISEASE WITHOUT ESOPHAGITIS: ICD-10-CM

## 2021-11-26 DIAGNOSIS — R13.19 ESOPHAGEAL DYSPHAGIA: ICD-10-CM

## 2021-11-26 DIAGNOSIS — R11.2 NAUSEA AND VOMITING, INTRACTABILITY OF VOMITING NOT SPECIFIED, UNSPECIFIED VOMITING TYPE: Primary | ICD-10-CM

## 2021-11-26 PROCEDURE — 99214 OFFICE O/P EST MOD 30 MIN: CPT | Performed by: INTERNAL MEDICINE

## 2021-11-26 RX ORDER — FAMOTIDINE 20 MG/1
20 TABLET, FILM COATED ORAL 2 TIMES DAILY
Qty: 60 TABLET | Refills: 11 | Status: SHIPPED | OUTPATIENT
Start: 2021-11-26

## 2021-12-17 DIAGNOSIS — G62.89 OTHER POLYNEUROPATHY: ICD-10-CM

## 2021-12-17 DIAGNOSIS — E03.8 SUBCLINICAL HYPOTHYROIDISM: ICD-10-CM

## 2021-12-17 RX ORDER — GABAPENTIN 100 MG/1
CAPSULE ORAL
Qty: 30 CAPSULE | Refills: 1 | OUTPATIENT
Start: 2021-12-17

## 2021-12-17 RX ORDER — LEVOTHYROXINE SODIUM 0.03 MG/1
25 TABLET ORAL DAILY
Qty: 90 TABLET | Refills: 1 | Status: CANCELLED | OUTPATIENT
Start: 2021-12-17

## 2021-12-17 RX ORDER — LEVOTHYROXINE SODIUM 0.03 MG/1
25 TABLET ORAL DAILY
Qty: 90 TABLET | Refills: 1 | Status: SHIPPED | OUTPATIENT
Start: 2021-12-17 | End: 2022-06-27 | Stop reason: SDUPTHER

## 2021-12-23 LAB
ALBUMIN SERPL-MCNC: 4.4 G/DL (ref 3.6–5.1)
ALBUMIN/GLOB SERPL: 1.7 (CALC) (ref 1–2.5)
ALP SERPL-CCNC: 113 U/L (ref 37–153)
ALT SERPL-CCNC: 18 U/L (ref 6–29)
AST SERPL-CCNC: 13 U/L (ref 10–35)
B BURGDOR AB SER QL IA: 1.03 INDEX
B BURGDOR IGG SER QL IB: NEGATIVE
B BURGDOR IGM SER QL IB: NEGATIVE
B BURGDOR18KD IGG SER QL IB: NORMAL
B BURGDOR23KD IGG SER QL IB: NORMAL
B BURGDOR23KD IGM SER QL IB: NORMAL
B BURGDOR28KD IGG SER QL IB: NORMAL
B BURGDOR30KD IGG SER QL IB: NORMAL
B BURGDOR39KD IGG SER QL IB: NORMAL
B BURGDOR39KD IGM SER QL IB: NORMAL
B BURGDOR41KD IGG SER QL IB: NORMAL
B BURGDOR41KD IGM SER QL IB: NORMAL
B BURGDOR45KD IGG SER QL IB: NORMAL
B BURGDOR58KD IGG SER QL IB: NORMAL
B BURGDOR66KD IGG SER QL IB: NORMAL
B BURGDOR93KD IGG SER QL IB: NORMAL
BASOPHILS # BLD AUTO: 70 CELLS/UL (ref 0–200)
BASOPHILS NFR BLD AUTO: 0.8 %
BILIRUB SERPL-MCNC: 0.5 MG/DL (ref 0.2–1.2)
BUN SERPL-MCNC: 15 MG/DL (ref 7–25)
BUN/CREAT SERPL: NORMAL (CALC) (ref 6–22)
CALCIUM SERPL-MCNC: 9.4 MG/DL (ref 8.6–10.4)
CHLORIDE SERPL-SCNC: 105 MMOL/L (ref 98–110)
CO2 SERPL-SCNC: 23 MMOL/L (ref 20–32)
CREAT SERPL-MCNC: 0.7 MG/DL (ref 0.6–0.93)
EOSINOPHIL # BLD AUTO: 670 CELLS/UL (ref 15–500)
EOSINOPHIL NFR BLD AUTO: 7.7 %
ERYTHROCYTE [DISTWIDTH] IN BLOOD BY AUTOMATED COUNT: 13.3 % (ref 11–15)
EST. AVERAGE GLUCOSE BLD GHB EST-MCNC: 114 MG/DL
EST. AVERAGE GLUCOSE BLD GHB EST-SCNC: 6.3 MMOL/L
FOLATE SERPL-MCNC: 16.4 NG/ML
GLOBULIN SER CALC-MCNC: 2.6 G/DL (CALC) (ref 1.9–3.7)
GLUCOSE SERPL-MCNC: 87 MG/DL (ref 65–99)
HBA1C MFR BLD: 5.6 % OF TOTAL HGB
HCT VFR BLD AUTO: 40.6 % (ref 35–45)
HGB BLD-MCNC: 13.3 G/DL (ref 11.7–15.5)
LYMPHOCYTES # BLD AUTO: 2888 CELLS/UL (ref 850–3900)
LYMPHOCYTES NFR BLD AUTO: 33.2 %
MCH RBC QN AUTO: 26.8 PG (ref 27–33)
MCHC RBC AUTO-ENTMCNC: 32.8 G/DL (ref 32–36)
MCV RBC AUTO: 81.7 FL (ref 80–100)
MONOCYTES # BLD AUTO: 479 CELLS/UL (ref 200–950)
MONOCYTES NFR BLD AUTO: 5.5 %
NEUTROPHILS # BLD AUTO: 4594 CELLS/UL (ref 1500–7800)
NEUTROPHILS NFR BLD AUTO: 52.8 %
PLATELET # BLD AUTO: 281 THOUSAND/UL (ref 140–400)
PMV BLD REES-ECKER: 11.7 FL (ref 7.5–12.5)
POTASSIUM SERPL-SCNC: 4.4 MMOL/L (ref 3.5–5.3)
PROT SERPL-MCNC: 7 G/DL (ref 6.1–8.1)
RBC # BLD AUTO: 4.97 MILLION/UL (ref 3.8–5.1)
SL AMB EGFR AFRICAN AMERICAN: 100 ML/MIN/1.73M2
SL AMB EGFR NON AFRICAN AMERICAN: 87 ML/MIN/1.73M2
SODIUM SERPL-SCNC: 140 MMOL/L (ref 135–146)
TSH SERPL-ACNC: 2.98 MIU/L (ref 0.4–4.5)
VIT B12 SERPL-MCNC: >2000 PG/ML (ref 200–1100)
WBC # BLD AUTO: 8.7 THOUSAND/UL (ref 3.8–10.8)

## 2022-01-06 ENCOUNTER — OFFICE VISIT (OUTPATIENT)
Dept: INTERNAL MEDICINE CLINIC | Age: 72
End: 2022-01-06
Payer: MEDICARE

## 2022-01-06 VITALS
TEMPERATURE: 98.1 F | SYSTOLIC BLOOD PRESSURE: 122 MMHG | WEIGHT: 186 LBS | OXYGEN SATURATION: 97 % | BODY MASS INDEX: 34.23 KG/M2 | HEIGHT: 62 IN | HEART RATE: 65 BPM | DIASTOLIC BLOOD PRESSURE: 72 MMHG

## 2022-01-06 DIAGNOSIS — I47.1 PAROXYSMAL ATRIAL TACHYCARDIA (HCC): ICD-10-CM

## 2022-01-06 DIAGNOSIS — J06.9 VIRAL URI: ICD-10-CM

## 2022-01-06 DIAGNOSIS — R05.3 PERSISTENT COUGH FOR 3 WEEKS OR LONGER: Primary | ICD-10-CM

## 2022-01-06 DIAGNOSIS — E66.01 OBESITY, MORBID (HCC): ICD-10-CM

## 2022-01-06 DIAGNOSIS — K21.00 GASTROESOPHAGEAL REFLUX DISEASE WITH ESOPHAGITIS WITHOUT HEMORRHAGE: ICD-10-CM

## 2022-01-06 PROCEDURE — 99214 OFFICE O/P EST MOD 30 MIN: CPT | Performed by: PHYSICIAN ASSISTANT

## 2022-01-06 RX ORDER — ALBUTEROL SULFATE 90 UG/1
2 AEROSOL, METERED RESPIRATORY (INHALATION) EVERY 6 HOURS PRN
Qty: 18 G | Refills: 0 | Status: SHIPPED | OUTPATIENT
Start: 2022-01-06

## 2022-01-06 RX ORDER — PREDNISONE 20 MG/1
20 TABLET ORAL DAILY
Qty: 5 TABLET | Refills: 0 | Status: SHIPPED | OUTPATIENT
Start: 2022-01-06 | End: 2022-02-22 | Stop reason: ALTCHOICE

## 2022-01-06 NOTE — PROGRESS NOTES
Assessment/Plan:         Diagnoses and all orders for this visit:    Persistent cough for 3 weeks or longer  -     predniSONE 20 mg tablet; Take 1 tablet (20 mg total) by mouth daily  -     albuterol (ProAir HFA) 90 mcg/act inhaler; Inhale 2 puffs every 6 (six) hours as needed for wheezing    Paroxysmal atrial tachycardia (HCC)    Obesity, morbid (HCC)    Viral URI  Comments:  ongoing x 2-3 weeks, pt reports cough persists    Gastroesophageal reflux disease with esophagitis without hemorrhage  Comments:  continue famotidine per GI  scheduled for egd            Subjective:      Patient ID: Neelima Friend is a 66 y o  female  65 y/o female with hx of htn, gerd, hypothyroid, OA  Pt is concerned with continued cough since episode of bronchitis, worse at night and hears wheezing at times   Tried mucinex last night which was helpful and allowed her to sleep through the night   Pt states her voice is raspy and tends to get worse as the day goes on  Intermittent "I can't get rid of the cough"  Pt states she wakes up frequently at night and is always tired so hard to say if she is worse now               The following portions of the patient's history were reviewed and updated as appropriate: allergies, current medications, past family history, past medical history, past social history, past surgical history and problem list     Review of Systems   Constitutional: Negative for activity change, appetite change, chills, diaphoresis, fatigue and fever  HENT: Positive for postnasal drip  Negative for sore throat  Respiratory: Positive for cough and chest tightness  Negative for shortness of breath and wheezing  Cardiovascular: Negative for chest pain and leg swelling  Gastrointestinal: Negative for abdominal pain, constipation, diarrhea and nausea  Skin: Negative for rash  Neurological: Negative for dizziness and headaches  Psychiatric/Behavioral: Negative for sleep disturbance   The patient is not nervous/anxious  Past Medical History:   Diagnosis Date    Anxiety     Last Assessed: [id number]    Arthritis     Last Assessed: [id number]    Colon polyp     GERD (gastroesophageal reflux disease)     Last Assessed: 31Oct2017    Hypothyroidism, adult 10/31/2017    Insomnia     Osteoporosis     Last Assessed: [id number]    Reflux esophagitis     Seasonal allergies     Urinary incontinence     Last Assessed: 31Oct2017         Current Outpatient Medications:     aspirin 81 MG tablet, Take 81 mg by mouth daily  , Disp: , Rfl:     cholecalciferol (VITAMIN D3) 1,000 units tablet, Take 1,000 Units by mouth daily , Disp: , Rfl:     cyanocobalamin (VITAMIN B-12) 1,000 mcg tablet, Take 1,000 mcg by mouth daily, Disp: , Rfl:     Diclofenac Sodium (VOLTAREN) 1 %, APPLY 4 G TOPICALLY 2 (TWO) TIMES A DAY AS NEEDED (KNEE PAIN), Disp: 100 g, Rfl: 1    esomeprazole (NexIUM) 20 mg capsule, Take 20 mg by mouth 2 (two) times a day , Disp: , Rfl:     ezetimibe (ZETIA) 10 mg tablet, TAKE 1 TABLET BY MOUTH DAILY, Disp: 90 tablet, Rfl: 1    famotidine (PEPCID) 20 mg tablet, Take 1 tablet (20 mg total) by mouth 2 (two) times a day, Disp: 60 tablet, Rfl: 11    fluticasone (FLONASE) 50 mcg/act nasal spray, 1 spray into each nostril daily, Disp: 16 mL, Rfl: 5    gabapentin (Neurontin) 100 mg capsule, Take 1 capsule (100 mg total) by mouth daily at bedtime, Disp: 30 capsule, Rfl: 5    levothyroxine 25 mcg tablet, Take 1 tablet (25 mcg total) by mouth daily, Disp: 90 tablet, Rfl: 1    LORazepam (ATIVAN) 0 5 mg tablet, Take 1 tablet (0 5 mg total) by mouth daily as needed for anxiety, Disp: 30 tablet, Rfl: 0    naproxen sodium (ALEVE) 220 MG tablet, Take 220 mg by mouth daily as needed  , Disp: , Rfl:     NON FORMULARY, CBD cream, applies to B/L knees, Disp: , Rfl:     nystatin (MYCOSTATIN) cream, APPLY TO THE AFFECTED AREA(S) BY TOPICAL ROUTE 2 TIMES PER DAY FOR 2 WEEKS, Disp: , Rfl: 2    nystatin (MYCOSTATIN) powder, Apply topically 2 (two) times a day, Disp: 120 g, Rfl: 1    Olopatadine HCl (PATADAY OP), Apply to eye, Disp: , Rfl:     Probiotic Product (ALIGN PO), Take by mouth daily, Disp: , Rfl:     rosuvastatin (CRESTOR) 10 MG tablet, Take 1 tablet (10 mg total) by mouth 3 (three) times a week Use three times a week, Disp: 36 tablet, Rfl: 3    terbinafine (LamISIL) 1 % cream, Apply topically 2 (two) times a day, Disp: 30 g, Rfl: 0    triamcinolone (KENALOG) 0 1 % cream, Apply topically 2 (two) times a day, Disp: 45 g, Rfl: 0    Zinc 100 MG TABS, prn, Disp: , Rfl:     albuterol (ProAir HFA) 90 mcg/act inhaler, Inhale 2 puffs every 6 (six) hours as needed for wheezing, Disp: 18 g, Rfl: 0    predniSONE 20 mg tablet, Take 1 tablet (20 mg total) by mouth daily, Disp: 5 tablet, Rfl: 0    Allergies   Allergen Reactions    Acetazolamide Hives    Fluticasone GI Intolerance    Cephalexin Itching    Sulfa Antibiotics Rash, Edema and Swelling       Social History   Past Surgical History:   Procedure Laterality Date    CHOLECYSTECTOMY      COLONOSCOPY      EXPLORATORY LAPAROTOMY      GALLBLADDER SURGERY      Last Assessed: [id number]    INCISIONAL BREAST BIOPSY      Last Assessed: [id number]   [facility] JOINT REPLACEMENT      KNEE SURGERY      Last Assessed: [id number]    [place] IMPLANT PERIPH/GASTRIC NEUROSTIM/ N/A 3/23/2016    Procedure:  REMOVAL IPG BATTERY ;  Surgeon: Lien Dee MD;  Location: [facility];  Service: UroGynecology           REPLACEMENT TOTAL KNEE Right 04/29/2019    ROOT CANAL  03/11/2019    SHOULDER SURGERY Left     SHOULDER SURGERY      Last Assessed: [id number]    TONSILLECTOMY      last Assessed: 31Oct2017    US GUIDED INJECTION FOR RESEARCH STUDY  10/3/2014    WISDOM TOOTH EXTRACTION      Last Assessed: 31Oct2017     Family History   Problem Relation Age of Onset    Lupus Mother     Other Mother         Cardiac Disorder    Other Father         Cardiac Disorder    Diabetes Father  Breast cancer Maternal Aunt     Breast cancer Cousin        Objective:  /72 (BP Location: Left arm, Patient Position: Sitting, Cuff Size: Adult)   Pulse 65   Temp 98 1 °F (36 7 °C) (Temporal)   Ht 5' 1 5" (1 562 m)   Wt 84 4 kg (186 lb)   SpO2 97%   BMI 34 58 kg/m²        Physical Exam  Vitals reviewed  Constitutional:       General: She is not in acute distress  HENT:      Head: Normocephalic and atraumatic  Right Ear: Tympanic membrane, ear canal and external ear normal       Left Ear: Tympanic membrane, ear canal and external ear normal    Eyes:      Extraocular Movements: Extraocular movements intact  Conjunctiva/sclera: Conjunctivae normal       Pupils: Pupils are equal, round, and reactive to light  Cardiovascular:      Rate and Rhythm: Normal rate and regular rhythm  Pulmonary:      Effort: Pulmonary effort is normal  No respiratory distress  Breath sounds: Wheezing (end exp wheeze b/l ) present  No rhonchi or rales  Musculoskeletal:      Cervical back: Normal range of motion  Right lower leg: No edema  Left lower leg: No edema  Lymphadenopathy:      Cervical: No cervical adenopathy  Skin:     General: Skin is warm  Findings: No erythema or rash  Neurological:      General: No focal deficit present  Mental Status: She is alert     Psychiatric:         Mood and Affect: Mood normal          Behavior: Behavior normal

## 2022-01-07 ENCOUNTER — TELEPHONE (OUTPATIENT)
Dept: ADMINISTRATIVE | Facility: OTHER | Age: 72
End: 2022-01-07

## 2022-01-07 NOTE — TELEPHONE ENCOUNTER
----- Message from Dannielle Vyas, [address] [place] [address] Shanice Goodend sent at 1/6/2022  1:11 PM EST -----  Regarding: Breast Triston 19: [phone]  01/06/22 1:11 PM    Hello, our patient Blane Gorman has had Mammogram completed/performed  Please assist in updating the patient chart by making an External outreach to [address] [place] [address] located in [place]  The date of service is Fall of 2021      Thank you,  Dannielle Vyas, MA  [facility] [address] [facility]

## 2022-01-07 NOTE — TELEPHONE ENCOUNTER
Upon review of the In Basket request we were able to note that no further action is required  The patient chart is up to date as a result of a previous request   HM is UTD 1/2021-  order from 10/27/21 is a screening  Any additional questions or concerns should be emailed to the Practice Liaisons via [email or web address] email, please do not reply via In Basket      Thank you  Mónica Luis

## 2022-01-14 DIAGNOSIS — E78.2 MIXED HYPERLIPIDEMIA: ICD-10-CM

## 2022-01-14 RX ORDER — EZETIMIBE 10 MG/1
TABLET ORAL
Qty: 90 TABLET | Refills: 1 | Status: SHIPPED | OUTPATIENT
Start: 2022-01-14

## 2022-01-17 ENCOUNTER — TELEPHONE (OUTPATIENT)
Dept: INTERNAL MEDICINE CLINIC | Age: 72
End: 2022-01-17

## 2022-01-17 NOTE — TELEPHONE ENCOUNTER
the patient called in reports that pharmacy is telling her she can not fill her ezetimibe before March 9th   the patient reports is out of medication     I tried to call [facility] but their phone systems are down, can not reach [facility] pharmacy or any other [facility]     Please try to call again tomorrow for the patient

## 2022-01-18 NOTE — TELEPHONE ENCOUNTER
Pt called the office and stated she did have it refilled and found the medication in her pantry  You can disregard calling the pharmacy

## 2022-02-21 ENCOUNTER — TELEPHONE (OUTPATIENT)
Dept: GASTROENTEROLOGY | Facility: AMBULARY SURGERY CENTER | Age: 72
End: 2022-02-21

## 2022-02-22 ENCOUNTER — ANESTHESIA (OUTPATIENT)
Dept: GASTROENTEROLOGY | Facility: AMBULARY SURGERY CENTER | Age: 72
End: 2022-02-22

## 2022-02-22 ENCOUNTER — HOSPITAL ENCOUNTER (OUTPATIENT)
Dept: GASTROENTEROLOGY | Facility: AMBULARY SURGERY CENTER | Age: 72
Setting detail: OUTPATIENT SURGERY
Discharge: HOME | End: 2022-02-22
Attending: INTERNAL MEDICINE | Admitting: INTERNAL MEDICINE
Payer: MEDICARE

## 2022-02-22 ENCOUNTER — ANESTHESIA EVENT (OUTPATIENT)
Dept: GASTROENTEROLOGY | Facility: AMBULARY SURGERY CENTER | Age: 72
End: 2022-02-22

## 2022-02-22 VITALS
TEMPERATURE: 97.2 F | HEART RATE: 68 BPM | OXYGEN SATURATION: 98 % | SYSTOLIC BLOOD PRESSURE: 125 MMHG | RESPIRATION RATE: 18 BRPM | DIASTOLIC BLOOD PRESSURE: 62 MMHG

## 2022-02-22 DIAGNOSIS — K21.9 GASTROESOPHAGEAL REFLUX DISEASE WITHOUT ESOPHAGITIS: ICD-10-CM

## 2022-02-22 DIAGNOSIS — R11.2 NAUSEA AND VOMITING, INTRACTABILITY OF VOMITING NOT SPECIFIED, UNSPECIFIED VOMITING TYPE: ICD-10-CM

## 2022-02-22 PROCEDURE — 43251 EGD REMOVE LESION SNARE: CPT | Performed by: INTERNAL MEDICINE

## 2022-02-22 PROCEDURE — 88305 TISSUE EXAM BY PATHOLOGIST: CPT | Performed by: PATHOLOGY

## 2022-02-22 PROCEDURE — 43239 EGD BIOPSY SINGLE/MULTIPLE: CPT | Performed by: INTERNAL MEDICINE

## 2022-02-22 RX ORDER — SODIUM CHLORIDE, SODIUM LACTATE, POTASSIUM CHLORIDE, CALCIUM CHLORIDE 600; 310; 30; 20 MG/100ML; MG/100ML; MG/100ML; MG/100ML
INJECTION, SOLUTION INTRAVENOUS CONTINUOUS PRN
Status: DISCONTINUED | OUTPATIENT
Start: 2022-02-22 | End: 2022-02-22

## 2022-02-22 RX ORDER — LIDOCAINE HYDROCHLORIDE 10 MG/ML
INJECTION, SOLUTION EPIDURAL; INFILTRATION; INTRACAUDAL; PERINEURAL AS NEEDED
Status: DISCONTINUED | OUTPATIENT
Start: 2022-02-22 | End: 2022-02-22

## 2022-02-22 RX ORDER — PROPOFOL 10 MG/ML
INJECTION, EMULSION INTRAVENOUS AS NEEDED
Status: DISCONTINUED | OUTPATIENT
Start: 2022-02-22 | End: 2022-02-22

## 2022-02-22 RX ADMIN — Medication 40 MG: at 10:45

## 2022-02-22 RX ADMIN — PROPOFOL 150 MG: 10 INJECTION, EMULSION INTRAVENOUS at 10:40

## 2022-02-22 RX ADMIN — PROPOFOL 50 MG: 10 INJECTION, EMULSION INTRAVENOUS at 10:45

## 2022-02-22 RX ADMIN — LIDOCAINE HYDROCHLORIDE 50 MG: 10 INJECTION, SOLUTION EPIDURAL; INFILTRATION; INTRACAUDAL at 10:40

## 2022-02-22 RX ADMIN — SODIUM CHLORIDE, SODIUM LACTATE, POTASSIUM CHLORIDE, AND CALCIUM CHLORIDE: .6; .31; .03; .02 INJECTION, SOLUTION INTRAVENOUS at 10:25

## 2022-02-22 NOTE — H&P
History and Physical - [facility]  River Flores 66 y o  female MRN: [id number]        HPI:  69-year-old female with history of GERD reports having episodes of gagging and vomiting at night      Historical Information   Past Medical History:   Diagnosis Date    Anxiety     Last Assessed: [id number]    Arthritis     Last Assessed: [id number]    Colon polyp     Disease of thyroid gland     GERD (gastroesophageal reflux disease)     Last Assessed: 31Oct2017    Hypothyroidism, adult 10/31/2017    Insomnia     Osteoporosis     Last Assessed: [id number]    Reflux esophagitis     Seasonal allergies     Urinary incontinence     Last Assessed: 31Oct2017     Past Surgical History:   Procedure Laterality Date    BREAST SURGERY      CHOLECYSTECTOMY      COLONOSCOPY      EXPLORATORY LAPAROTOMY      GALLBLADDER SURGERY      Last Assessed: [id number]    INCISIONAL BREAST BIOPSY      Last Assessed: [id number]   [facility] JOINT REPLACEMENT      KNEE SURGERY      Last Assessed: [id number]    [place] IMPLANT PERIPH/GASTRIC NEUROSTIM/ N/A 3/23/2016    Procedure:  REMOVAL IPG BATTERY ;  Surgeon: Jacob Roberts MD;  [facility];  Service: UroGynecology           REPLACEMENT TOTAL KNEE Right 04/29/2019    ROOT CANAL  03/11/2019    SHOULDER SURGERY Left     SHOULDER SURGERY      Last Assessed: 31Oct2017    TONSILLECTOMY      last Assessed: 31Oct2017    US GUIDED INJECTION FOR RESEARCH STUDY  10/3/2014    WISDOM TOOTH EXTRACTION      Last Assessed: 31Oct2017     Social History   Social History     Substance and Sexual Activity   Alcohol Use Yes    Alcohol/week: 0 0 standard drinks    Comment: Social - wine     Social History     Substance and Sexual Activity   Drug Use No     Social History     Tobacco Use   Smoking Status Never Smoker   Smokeless Tobacco Never Used     Family History   Problem Relation Age of Onset    Lupus Mother     Other Mother         Cardiac Disorder    Other Father Cardiac Disorder    Diabetes Father     Breast cancer Maternal Aunt     Breast cancer Cousin        Meds/Allergies     (Not in a hospital admission)      Allergies   Allergen Reactions    Acetazolamide Hives    Fluticasone GI Intolerance    Cephalexin Itching    Sulfa Antibiotics Rash, Edema and Swelling       Objective     Blood pressure 156/69, pulse 78, temperature (!) 97 2 °F (36 2 °C), temperature source Temporal, resp  rate 18, SpO2 98 %, currently breastfeeding      PHYSICAL EXAM:    Gen: NAD  CV: S1 & S2 normal, RRR  CHEST: Clear to auscultate  ABD: soft, NT/ND, good bowel sounds  EXT: no edema    ASSESSMENT:     GERD, vomiting    PLAN:    EGD

## 2022-02-22 NOTE — ANESTHESIA PREPROCEDURE EVALUATION
Procedure:  EGD    Relevant Problems   CARDIO   (+) Essential hypertension   (+) Hyperlipidemia   (+) Paroxysmal atrial tachycardia (HCC)      ENDO   (+) Hypothyroidism, adult      GI/HEPATIC   (+) Gastroesophageal reflux disease without esophagitis      MUSCULOSKELETAL   (+) Arthritis   (+) Arthritis of knee      PULMONARY   (+) Viral URI        Physical Exam    Airway    Mallampati score: III  TM Distance: >3 FB  Neck ROM: full     Dental   No notable dental hx     Cardiovascular  Rhythm: regular, Rate: normal,     Pulmonary  Breath sounds clear to auscultation,     Other Findings  Intercisor Distance > 3cm          Anesthesia Plan  ASA Score- 2     Anesthesia Type- IV sedation with anesthesia with ASA Monitors  Additional Monitors:   Airway Plan:     Comment: NPO appropriate  Discussed benefits/risks of monitored anesthetic care and discussed providing a dynamic level of mild to deep sedation  Complications include awareness, airway obstruction, aspiration which may necessitate conversion to general anesthesia  All questions answered  Patient understands and wishes to proceed          Plan Factors-Exercise tolerance (METS): >4 METS  Chart reviewed  EKG reviewed  Existing labs reviewed  Induction-     Postoperative Plan- Plan for postoperative opioid use  Informed Consent- Anesthetic plan and risks discussed with patient  I personally reviewed this patient with the CRNA  Discussed and agreed on the Anesthesia Plan with the CRNA Gerhardt Sep

## 2022-02-22 NOTE — ANESTHESIA POSTPROCEDURE EVALUATION
Post-Op Assessment Note    CV Status:  Stable  Pain Score: 0    Pain management: adequate     Mental Status:  Sleepy   Hydration Status:  Euvolemic   PONV Controlled:  Controlled   Airway Patency:  Patent      Post Op Vitals Reviewed: Yes      Staff: CRNA         No complications documented      /55 (02/22/22 1051)    Temp (!) 97 2 °F (36 2 °C) (02/22/22 1051)    Pulse 75 (02/22/22 1051)   Resp 18 (02/22/22 1051)    SpO2 98 % (02/22/22 1051)

## 2022-03-23 DIAGNOSIS — F40.243 FEAR OF FLYING: ICD-10-CM

## 2022-03-23 DIAGNOSIS — F41.9 ANXIETY: ICD-10-CM

## 2022-03-23 RX ORDER — LORAZEPAM 0.5 MG/1
0.5 TABLET ORAL DAILY PRN
Qty: 30 TABLET | Refills: 0 | Status: SHIPPED | OUTPATIENT
Start: 2022-03-23 | End: 2022-05-25 | Stop reason: SDUPTHER

## 2022-03-23 NOTE — TELEPHONE ENCOUNTER
Patient is calling to request the following medication to be refilled    Lorazepam 0 5 mg one tablet by mouth daily as needed for anxiety    [facility] [place]

## 2022-04-06 ENCOUNTER — OFFICE VISIT (OUTPATIENT)
Dept: INTERNAL MEDICINE CLINIC | Age: 72
End: 2022-04-06
Payer: MEDICARE

## 2022-04-06 VITALS
TEMPERATURE: 98.6 F | OXYGEN SATURATION: 97 % | HEART RATE: 69 BPM | DIASTOLIC BLOOD PRESSURE: 80 MMHG | BODY MASS INDEX: 32.22 KG/M2 | HEIGHT: 65 IN | WEIGHT: 193.4 LBS | SYSTOLIC BLOOD PRESSURE: 140 MMHG

## 2022-04-06 DIAGNOSIS — R21 RASH: ICD-10-CM

## 2022-04-06 DIAGNOSIS — M25.542 JOINT PAIN IN BOTH HANDS: ICD-10-CM

## 2022-04-06 DIAGNOSIS — E78.2 MIXED HYPERLIPIDEMIA: ICD-10-CM

## 2022-04-06 DIAGNOSIS — M79.10 MYALGIA: ICD-10-CM

## 2022-04-06 DIAGNOSIS — R21 RASH AND OTHER NONSPECIFIC SKIN ERUPTION: ICD-10-CM

## 2022-04-06 DIAGNOSIS — K62.89 RECTAL PAIN: ICD-10-CM

## 2022-04-06 DIAGNOSIS — R53.83 FATIGUE, UNSPECIFIED TYPE: ICD-10-CM

## 2022-04-06 DIAGNOSIS — N95.1 MENOPAUSAL STATE: Primary | ICD-10-CM

## 2022-04-06 DIAGNOSIS — M25.541 JOINT PAIN IN BOTH HANDS: ICD-10-CM

## 2022-04-06 DIAGNOSIS — E03.9 HYPOTHYROIDISM, UNSPECIFIED TYPE: ICD-10-CM

## 2022-04-06 PROCEDURE — 99214 OFFICE O/P EST MOD 30 MIN: CPT | Performed by: INTERNAL MEDICINE

## 2022-04-06 NOTE — PROGRESS NOTES
Assessment/Plan:    No problem-specific Assessment & Plan notes found for this encounter  Diagnoses and all orders for this visit:    Menopausal state          Subjective:      Patient ID: Clare Taylor is a 66 y o  female  A case of a 54-year-old white female who presents with increasing fatigue, joint discomfort and lightheadedness  This has become progressively worse over the past 1mo  Patient is using more of her lorazepam because she feels that in the evenings she does get some chest discomfort (tightness)  Patient has a history of gastro of esophageal reflux hypothyroidism essential hypertension paroxysmal atrial tachycardia as well as a history of hyperlipidemia  and of obesity  Sensation noted the 2 circular red areas on her ankles, patient states that home there are many year and her property however he does not recall any tick bite  The following portions of the patient's history were reviewed and updated as appropriate: allergies, current medications, past family history, past medical history, past social history and past surgical history  Review of Systems   Constitutional: Positive for fatigue  Negative for appetite change, chills, diaphoresis, fever and unexpected weight change  HENT: Positive for postnasal drip  Negative for congestion, facial swelling, hearing loss, mouth sores, sinus pain, sore throat and tinnitus  Eyes: Negative  Respiratory: Positive for chest tightness  Negative for cough, shortness of breath, wheezing and stridor  Cardiovascular: Negative for chest pain, palpitations and leg swelling  Gastrointestinal: Positive for rectal pain  Negative for abdominal distention, abdominal pain, anal bleeding, blood in stool, constipation, diarrhea and nausea  Cologuard within 8 months   Musculoskeletal: Positive for arthralgias and joint swelling (knees)  Neurological: Positive for light-headedness     Psychiatric/Behavioral: Negative for agitation and hallucinations  The patient is not nervous/anxious and is not hyperactive  Objective:      /80 (BP Location: Left arm, Patient Position: Sitting, Cuff Size: Adult)   Pulse 69   Temp 98 6 °F (37 °C) (Temporal)   Ht 5' 4 5" (1 638 m)   Wt 87 7 kg (193 lb 6 4 oz)   SpO2 97% Comment: room air  BMI 32 68 kg/m²          Physical Exam  Vitals and nursing note reviewed  Constitutional:       General: She is not in acute distress  Appearance: Normal appearance  She is obese  She is not ill-appearing, toxic-appearing or diaphoretic  HENT:      Head: Atraumatic  Right Ear: There is impacted cerumen  Left Ear: There is impacted cerumen  Nose: No rhinorrhea  Eyes:      Extraocular Movements: Extraocular movements intact  Cardiovascular:      Rate and Rhythm: Regular rhythm  Heart sounds: No murmur heard  No gallop  Pulmonary:      Effort: No respiratory distress  Breath sounds: No wheezing, rhonchi or rales  Abdominal:      General: Abdomen is flat  There is no distension  Palpations: Abdomen is soft  There is no mass  Tenderness: There is abdominal tenderness  Musculoskeletal:         General: No swelling or tenderness  Cervical back: No rigidity or tenderness  Skin:     General: Skin is warm and dry  Neurological:      General: No focal deficit present  Mental Status: She is alert

## 2022-04-06 NOTE — PATIENT INSTRUCTIONS
1  Get lab within the next month or so  2  Notify Dr Francesco Rudolph of your chest tightness that she use lorazepam  3  Office visit within the next 6 to 8 weeks   4   For your rectal pain have an appointment with Dr Navarro Nava and have an eval of the rectal area

## 2022-04-07 ENCOUNTER — TELEPHONE (OUTPATIENT)
Dept: ADMINISTRATIVE | Facility: OTHER | Age: 72
End: 2022-04-07

## 2022-04-07 NOTE — TELEPHONE ENCOUNTER
----- Message from Daja Gonzalez RN sent at 4/6/2022  4:30 PM EDT -----  Regarding: [facility]  04/06/22 4:31 PM    Hello, our patient Clare Claudia has had Mammogram completed/performed  Please assist in updating the patient chart by pulling the Care Everywhere (CE) document  The date of service is 1/26/22       Thank you,  SHLOMO Pool

## 2022-04-07 NOTE — TELEPHONE ENCOUNTER
Upon review of the In Basket request we were able to locate, review, and update the patient chart as requested for Mammogram     Any additional questions or concerns should be emailed to the Practice Liaisons via [email or web address] email, please do not reply via In Basket      Thank you  Colleen Whiting

## 2022-04-18 DIAGNOSIS — B49 FUNGAL INFECTION: ICD-10-CM

## 2022-04-18 RX ORDER — NYSTATIN 100000 [USP'U]/G
POWDER TOPICAL 2 TIMES DAILY
Qty: 60 G | Refills: 2 | Status: SHIPPED | OUTPATIENT
Start: 2022-04-18

## 2022-04-18 NOTE — TELEPHONE ENCOUNTER
Patient is requesting the following medication to be sent to [facility] on [address]    Nystatin Powder

## 2022-04-27 ENCOUNTER — RA CDI HCC (OUTPATIENT)
Dept: OTHER | Facility: HOSPITAL | Age: 72
End: 2022-04-27

## 2022-04-27 NOTE — PROGRESS NOTES
[address]  coding opportunities       Chart reviewed, no opportunity found: CHART REVIEWED, NO OPPORTUNITY FOUND        Patients Insurance     Medicare Insurance: Medicare

## 2022-05-16 LAB
A PHAGOCYTOPH DNA BLD QL NAA+PROBE: NOT DETECTED
ANA SER QL IF: NEGATIVE
B BURGDOR DNA BLD QL NAA+PROBE: NOT DETECTED
B MICROTI DNA BLD QL NAA+PROBE: NOT DETECTED
B MIYAMOTOI FLAB SPEC QL NAA+PROBE: NOT DETECTED
BASOPHILS # BLD AUTO: 82 CELLS/UL (ref 0–200)
BASOPHILS NFR BLD AUTO: 1 %
BUN SERPL-MCNC: 21 MG/DL (ref 7–25)
BUN/CREAT SERPL: NORMAL (CALC) (ref 6–22)
CALCIUM SERPL-MCNC: 8.9 MG/DL (ref 8.6–10.4)
CHLORIDE SERPL-SCNC: 107 MMOL/L (ref 98–110)
CO2 SERPL-SCNC: 27 MMOL/L (ref 20–32)
CREAT SERPL-MCNC: 0.71 MG/DL (ref 0.6–0.93)
E CHAFFEENSIS DNA BLD QL NAA+PROBE: NOT DETECTED
EOSINOPHIL # BLD AUTO: 574 CELLS/UL (ref 15–500)
EOSINOPHIL NFR BLD AUTO: 7 %
ERYTHROCYTE [DISTWIDTH] IN BLOOD BY AUTOMATED COUNT: 13.2 % (ref 11–15)
ERYTHROCYTE [SEDIMENTATION RATE] IN BLOOD BY WESTERGREN METHOD: 11 MM/H
GLUCOSE SERPL-MCNC: 127 MG/DL (ref 65–139)
HCT VFR BLD AUTO: 38.6 % (ref 35–45)
HGB BLD-MCNC: 12.7 G/DL (ref 11.7–15.5)
LYMPHOCYTES # BLD AUTO: 2968 CELLS/UL (ref 850–3900)
LYMPHOCYTES NFR BLD AUTO: 36.2 %
MCH RBC QN AUTO: 27.1 PG (ref 27–33)
MCHC RBC AUTO-ENTMCNC: 32.9 G/DL (ref 32–36)
MCV RBC AUTO: 82.3 FL (ref 80–100)
MONOCYTES # BLD AUTO: 467 CELLS/UL (ref 200–950)
MONOCYTES NFR BLD AUTO: 5.7 %
NEUTROPHILS # BLD AUTO: 4108 CELLS/UL (ref 1500–7800)
NEUTROPHILS NFR BLD AUTO: 50.1 %
PLATELET # BLD AUTO: 284 THOUSAND/UL (ref 140–400)
PMV BLD REES-ECKER: 11.6 FL (ref 7.5–12.5)
POTASSIUM SERPL-SCNC: 4.1 MMOL/L (ref 3.5–5.3)
RBC # BLD AUTO: 4.69 MILLION/UL (ref 3.8–5.1)
SL AMB EGFR AFRICAN AMERICAN: 99 ML/MIN/1.73M2
SL AMB EGFR NON AFRICAN AMERICAN: 85 ML/MIN/1.73M2
SODIUM SERPL-SCNC: 140 MMOL/L (ref 135–146)
TSH SERPL-ACNC: 2.74 MIU/L (ref 0.4–4.5)
URATE SERPL-MCNC: 5.9 MG/DL (ref 2.5–7)
WBC # BLD AUTO: 8.2 THOUSAND/UL (ref 3.8–10.8)

## 2022-05-25 ENCOUNTER — OFFICE VISIT (OUTPATIENT)
Dept: INTERNAL MEDICINE CLINIC | Age: 72
End: 2022-05-25
Payer: MEDICARE

## 2022-05-25 VITALS
TEMPERATURE: 97.9 F | BODY MASS INDEX: 31.19 KG/M2 | SYSTOLIC BLOOD PRESSURE: 138 MMHG | OXYGEN SATURATION: 98 % | DIASTOLIC BLOOD PRESSURE: 72 MMHG | HEIGHT: 65 IN | WEIGHT: 187.2 LBS | HEART RATE: 74 BPM

## 2022-05-25 DIAGNOSIS — J40 BRONCHITIS: ICD-10-CM

## 2022-05-25 DIAGNOSIS — R05.9 COUGH: ICD-10-CM

## 2022-05-25 DIAGNOSIS — Z78.0 POST-MENOPAUSAL: Primary | ICD-10-CM

## 2022-05-25 DIAGNOSIS — F41.9 ANXIETY: ICD-10-CM

## 2022-05-25 DIAGNOSIS — M85.80 OSTEOPENIA, UNSPECIFIED LOCATION: ICD-10-CM

## 2022-05-25 DIAGNOSIS — F40.243 FEAR OF FLYING: ICD-10-CM

## 2022-05-25 PROCEDURE — 99214 OFFICE O/P EST MOD 30 MIN: CPT | Performed by: INTERNAL MEDICINE

## 2022-05-25 RX ORDER — LORAZEPAM 0.5 MG/1
0.5 TABLET ORAL
Qty: 30 TABLET | Refills: 0 | Status: SHIPPED | OUTPATIENT
Start: 2022-05-25

## 2022-05-25 RX ORDER — PREDNISONE 20 MG/1
10 TABLET ORAL 2 TIMES DAILY WITH MEALS
Qty: 28 TABLET | Refills: 1 | Status: SHIPPED | OUTPATIENT
Start: 2022-05-25

## 2022-05-25 NOTE — PROGRESS NOTES
Assessment/Plan:    No problem-specific Assessment & Plan notes found for this encounter  Diagnoses and all orders for this visit:    Post-menopausal  -     DXA bone density spine hip and pelvis; Future    Anxiety  -     LORazepam (ATIVAN) 0 5 mg tablet; Take 1 tablet (0 5 mg total) by mouth daily at bedtime    Fear of flying  -     LORazepam (ATIVAN) 0 5 mg tablet; Take 1 tablet (0 5 mg total) by mouth daily at bedtime    Osteopenia, unspecified location  -     DXA bone density spine hip and pelvis; Future          Subjective:      Patient ID: Nicole Boo is a 66 y o  female  Case of a 74-year-old white female who has chest pain of unknown etiology the workup by her cardiologist   She recently had an episode of bronchitis and which if you have a persistent cough  The cough has not resolved as of yet patient still coughs significantly during the day but is specially along  Patient noted some increase in phlegm  No blood in the sputum  The patient has a history of hypothyroidism essential hypertension arthritis and hyperlipidemia  The patient also has a history of obesity  The following portions of the patient's history were reviewed and updated as appropriate: allergies, current medications, past family history, past medical history, past social history and past surgical history  Review of Systems   Constitutional: Negative for activity change, appetite change, chills, diaphoresis, fatigue, fever and unexpected weight change  HENT: Negative for congestion, rhinorrhea, sinus pressure, sinus pain, sneezing, sore throat, tinnitus and trouble swallowing  Eyes: Negative  Respiratory: Positive for cough and chest tightness  Negative for shortness of breath and wheezing  Cardiovascular: Positive for chest pain  Negative for palpitations and leg swelling  Gastrointestinal: Negative for abdominal distention, blood in stool, constipation, diarrhea and nausea     Genitourinary: Positive for urgency  Negative for difficulty urinating, dysuria and frequency  Incontinent   Musculoskeletal: Positive for arthralgias, back pain, myalgias and neck pain  Knees , shoulder , neck and hands   Skin: Negative  Neurological: Negative for dizziness, tremors, syncope, speech difficulty, weakness, light-headedness and headaches  Psychiatric/Behavioral: Negative for agitation, confusion and dysphoric mood  The patient is nervous/anxious  Objective:      /72 (BP Location: Left arm, Patient Position: Sitting, Cuff Size: Large)   Pulse 74   Temp 97 9 °F (36 6 °C) (Temporal)   Ht 5' 4 5" (1 638 m)   Wt 84 9 kg (187 lb 3 2 oz)   SpO2 98% Comment: room air  BMI 31 64 kg/m²          Physical Exam  Vitals reviewed  Constitutional:       Appearance: Normal appearance  She is obese  HENT:      Head: Atraumatic  Cardiovascular:      Rate and Rhythm: Normal rate  Heart sounds: No murmur heard  Pulmonary:      Breath sounds: Rhonchi and rales present  No wheezing  Abdominal:      General: Abdomen is flat  Bowel sounds are normal  There is no distension  Palpations: There is mass  Musculoskeletal:      Right lower leg: No edema  Left lower leg: No edema  Skin:     Coloration: Skin is not jaundiced or pale  Findings: No bruising or erythema  Neurological:      Mental Status: She is alert and oriented to person, place, and time  Mental status is at baseline  Cranial Nerves: No cranial nerve deficit  Coordination: Coordination normal       Gait: Gait normal       Deep Tendon Reflexes: Reflexes normal    Psychiatric:         Mood and Affect: Mood normal          Behavior: Behavior normal          Thought Content:  Thought content normal          Judgment: Judgment normal

## 2022-05-25 NOTE — PATIENT INSTRUCTIONS
Start prednisone 10mg take 4 a day or 2 days than 3 a day for 2 days than 2 a day for 2 days and than 1 a day for 2 days  Get a chest xray  Watch diet closely while taking steroids  4 take mucinex liq

## 2022-05-26 ENCOUNTER — HOSPITAL ENCOUNTER (OUTPATIENT)
Dept: RADIOLOGY | Facility: HOSPITAL | Age: 72
Discharge: HOME | End: 2022-05-26
Payer: MEDICARE

## 2022-05-26 DIAGNOSIS — R05.9 COUGH: ICD-10-CM

## 2022-05-26 PROCEDURE — 71046 X-RAY EXAM CHEST 2 VIEWS: CPT

## 2022-06-03 ENCOUNTER — NURSE TRIAGE (OUTPATIENT)
Dept: OTHER | Facility: OTHER | Age: 72
End: 2022-06-03

## 2022-06-04 NOTE — TELEPHONE ENCOUNTER
Patient started with chills, cough and temp of 100 0 today  Took an at home covid test which resulted as positive  Denies any CP or SOB  Care advice given

## 2022-06-04 NOTE — TELEPHONE ENCOUNTER
Reason for Disposition   [1] COVID-19 diagnosed by positive lab test (e g , PCR, rapid self-test kit) AND [2] mild symptoms (e g , cough, fever, others) AND [7] no complications or SOB    Answer Assessment - Initial Assessment Questions  Were you within 6 feet or less, for up to 15 minutes or more with a person that has a confirmed COVID-19 test? unsure  What was the date of your exposure? unsure  Are you experiencing any symptoms attributed to the virus?  (Assess for SOB, cough, fever, difficulty breathing) chills, temp 100 0, cough  HIGH RISK: Do you have any history heart or lung conditions, weakened immune system, diabetes, Asthma, CHF, HIV, COPD, Chemo, renal failure, sickle cell, etc? denies  PREGNANCY: Are you pregnant or did you recently give birth? no  VACCINE: "Have you gotten the COVID-19 vaccine?" If Yes ask: "Which one, how many shots, when did you get it?" yes    Protocols used: CORONAVIRUS (COVID-19) DIAGNOSED OR SUSPECTED-[facility]

## 2022-06-06 NOTE — TELEPHONE ENCOUNTER
Spoke to pt  She is feeling a little better  Understands to quarantine till tomorrow and to call us if she gets any worse

## 2022-06-20 ENCOUNTER — TELEPHONE (OUTPATIENT)
Dept: INTERNAL MEDICINE CLINIC | Age: 72
End: 2022-06-20

## 2022-06-20 NOTE — TELEPHONE ENCOUNTER
Pt was prescribed prednisone for a prolonged cough by Dr Silvestre Prudent  She now wants to take OTC tylenol cold & flu and is asking if there is any type of drug interaction

## 2022-06-21 NOTE — TELEPHONE ENCOUNTER
I called the patient and she stated that she contacted the pharmacy and they informed her that there is no interaction and that combination of meds may be taken together

## 2022-06-27 ENCOUNTER — TELEPHONE (OUTPATIENT)
Dept: INTERNAL MEDICINE CLINIC | Age: 72
End: 2022-06-27

## 2022-06-27 DIAGNOSIS — E03.8 SUBCLINICAL HYPOTHYROIDISM: ICD-10-CM

## 2022-06-27 RX ORDER — LEVOTHYROXINE SODIUM 0.03 MG/1
25 TABLET ORAL DAILY
Qty: 90 TABLET | Refills: 1 | Status: SHIPPED | OUTPATIENT
Start: 2022-06-27

## 2022-06-27 NOTE — TELEPHONE ENCOUNTER
Patient is calling to get a refill on the following medication     Levothyroxine 25 mcg once a day    90 day supply    NOV: 10/12/2022

## 2022-10-05 ENCOUNTER — RA CDI HCC (OUTPATIENT)
Dept: OTHER | Facility: HOSPITAL | Age: 72
End: 2022-10-05

## 2022-10-12 PROBLEM — N39.0 URINARY TRACT INFECTION WITHOUT HEMATURIA: Status: RESOLVED | Noted: 2021-06-09 | Resolved: 2022-10-12

## 2022-10-19 DIAGNOSIS — L03.032 PARONYCHIA OF TOENAIL, LEFT: ICD-10-CM

## 2022-10-19 DIAGNOSIS — E78.2 MIXED HYPERLIPIDEMIA: ICD-10-CM

## 2022-10-19 DIAGNOSIS — B49 FUNGAL INFECTION: ICD-10-CM

## 2022-10-19 RX ORDER — EZETIMIBE 10 MG/1
TABLET ORAL
Qty: 90 TABLET | Refills: 1 | Status: SHIPPED | OUTPATIENT
Start: 2022-10-19

## 2022-10-20 DIAGNOSIS — E78.2 MIXED HYPERLIPIDEMIA: ICD-10-CM

## 2022-10-20 RX ORDER — PRENATAL VIT 91/IRON/FOLIC/DHA 28-975-200
COMBINATION PACKAGE (EA) ORAL 2 TIMES DAILY
Qty: 30 G | Refills: 0 | Status: SHIPPED | OUTPATIENT
Start: 2022-10-20

## 2022-10-20 RX ORDER — NYSTATIN 100000 [USP'U]/G
POWDER TOPICAL 2 TIMES DAILY
Qty: 60 G | Refills: 2 | Status: SHIPPED | OUTPATIENT
Start: 2022-10-20

## 2022-10-20 RX ORDER — ROSUVASTATIN CALCIUM 10 MG/1
10 TABLET, COATED ORAL 3 TIMES WEEKLY
Qty: 36 TABLET | Refills: 3 | Status: SHIPPED | OUTPATIENT
Start: 2022-10-21

## 2022-10-21 DIAGNOSIS — B37.2 CANDIDIASIS OF SKIN: Primary | ICD-10-CM

## 2022-10-21 RX ORDER — NYSTATIN 100000 U/G
CREAM TOPICAL 2 TIMES DAILY
Qty: 30 G | Refills: 2 | Status: SHIPPED | OUTPATIENT
Start: 2022-10-21

## 2022-11-02 ENCOUNTER — OFFICE VISIT (OUTPATIENT)
Dept: INTERNAL MEDICINE CLINIC | Age: 72
End: 2022-11-02

## 2022-11-02 VITALS
BODY MASS INDEX: 34.6 KG/M2 | DIASTOLIC BLOOD PRESSURE: 74 MMHG | OXYGEN SATURATION: 98 % | TEMPERATURE: 98.3 F | HEIGHT: 62 IN | SYSTOLIC BLOOD PRESSURE: 118 MMHG | HEART RATE: 65 BPM | WEIGHT: 188 LBS

## 2022-11-02 DIAGNOSIS — I10 PRIMARY HYPERTENSION: ICD-10-CM

## 2022-11-02 DIAGNOSIS — Z00.00 ENCOUNTER FOR ANNUAL WELLNESS VISIT (AWV) IN MEDICARE PATIENT: ICD-10-CM

## 2022-11-02 DIAGNOSIS — Z23 NEED FOR INFLUENZA VACCINATION: ICD-10-CM

## 2022-11-02 DIAGNOSIS — M19.90 ARTHRITIS: ICD-10-CM

## 2022-11-02 DIAGNOSIS — M17.10 ARTHRITIS OF KNEE: ICD-10-CM

## 2022-11-02 DIAGNOSIS — E03.9 HYPOTHYROIDISM, ADULT: Primary | ICD-10-CM

## 2022-11-02 DIAGNOSIS — I10 ESSENTIAL HYPERTENSION: ICD-10-CM

## 2022-11-02 DIAGNOSIS — K21.00 GASTROESOPHAGEAL REFLUX DISEASE WITH ESOPHAGITIS WITHOUT HEMORRHAGE: ICD-10-CM

## 2022-11-02 NOTE — PATIENT INSTRUCTIONS
Medicare Preventive Visit Patient Instructions  Thank you for completing your Welcome to Medicare Visit or Medicare Annual Wellness Visit today  Your next wellness visit will be due in one year (11/3/2023)  The screening/preventive services that you may require over the next 5-10 years are detailed below  Some tests may not apply to you based off risk factors and/or age  Screening tests ordered at today's visit but not completed yet may show as past due  Also, please note that scanned in results may not display below  Preventive Screenings:  Service Recommendations Previous Testing/Comments   Colorectal Cancer Screening  * Colonoscopy    * Fecal Occult Blood Test (FOBT)/Fecal Immunochemical Test (FIT)  * Fecal DNA/Cologuard Test  * Flexible Sigmoidoscopy Age: 39-70 years old   Colonoscopy: every 10 years (may be performed more frequently if at higher risk)  OR  FOBT/FIT: every 1 year  OR  Cologuard: every 3 years  OR  Sigmoidoscopy: every 5 years  Screening may be recommended earlier than age 39 if at higher risk for colorectal cancer  Also, an individualized decision between you and your healthcare provider will decide whether screening between the ages of 74-80 would be appropriate  Colonoscopy: 04/30/2020  FOBT/FIT: Not on file  Cologuard: 04/30/2020  Sigmoidoscopy: Not on file    Screening Current     Breast Cancer Screening Age: 36 years old  Frequency: every 1-2 years  Not required if history of left and right mastectomy Mammogram: 01/26/2022    Screening Current   Cervical Cancer Screening Between the ages of 21-29, pap smear recommended once every 3 years  Between the ages of 33-67, can perform pap smear with HPV co-testing every 5 years     Recommendations may differ for women with a history of total hysterectomy, cervical cancer, or abnormal pap smears in past  Pap Smear: Not on file    Screening Not Indicated   Hepatitis C Screening Once for adults born between 1945 and 1965  More frequently in patients at high risk for Hepatitis C Hep C Antibody: 05/23/2019    Screening Current   Diabetes Screening 1-2 times per year if you're at risk for diabetes or have pre-diabetes Fasting glucose: No results in last 5 years (No results in last 5 years)  A1C: 5 6 % of total Hgb (12/20/2021)  Screening Current   Cholesterol Screening Once every 5 years if you don't have a lipid disorder  May order more often based on risk factors  Lipid panel: 05/18/2021    Screening Not Indicated  History Lipid Disorder     Other Preventive Screenings Covered by Medicare:  1  Abdominal Aortic Aneurysm (AAA) Screening: covered once if your at risk  You're considered to be at risk if you have a family history of AAA  2  Lung Cancer Screening: covers low dose CT scan once per year if you meet all of the following conditions: (1) Age 50-69; (2) No signs or symptoms of lung cancer; (3) Current smoker or have quit smoking within the last 15 years; (4) You have a tobacco smoking history of at least 20 pack years (packs per day multiplied by number of years you smoked); (5) You get a written order from a healthcare provider  3  Glaucoma Screening: covered annually if you're considered high risk: (1) You have diabetes OR (2) Family history of glaucoma OR (3)  aged 48 and older OR (3)  American aged 72 and older  3  Osteoporosis Screening: covered every 2 years if you meet one of the following conditions: (1) You're estrogen deficient and at risk for osteoporosis based off medical history and other findings; (2) Have a vertebral abnormality; (3) On glucocorticoid therapy for more than 3 months; (4) Have primary hyperparathyroidism; (5) On osteoporosis medications and need to assess response to drug therapy  · Last bone density test (DXA Scan): Not on file  5  HIV Screening: covered annually if you're between the age of 12-76   Also covered annually if you are younger than 13 and older than 72 with risk factors for HIV infection  For pregnant patients, it is covered up to 3 times per pregnancy  Immunizations:  Immunization Recommendations   Influenza Vaccine Annual influenza vaccination during flu season is recommended for all persons aged >= 6 months who do not have contraindications   Pneumococcal Vaccine   * Pneumococcal conjugate vaccine = PCV13 (Prevnar 13), PCV15 (Vaxneuvance), PCV20 (Prevnar 20)  * Pneumococcal polysaccharide vaccine = PPSV23 (Pneumovax) Adults 25-60 years old: 1-3 doses may be recommended based on certain risk factors  Adults 72 years old: 1-2 doses may be recommended based off what pneumonia vaccine you previously received   Hepatitis B Vaccine 3 dose series if at intermediate or high risk (ex: diabetes, end stage renal disease, liver disease)   Tetanus (Td) Vaccine - COST NOT COVERED BY MEDICARE PART B Following completion of primary series, a booster dose should be given every 10 years to maintain immunity against tetanus  Td may also be given as tetanus wound prophylaxis  Tdap Vaccine - COST NOT COVERED BY MEDICARE PART B Recommended at least once for all adults  For pregnant patients, recommended with each pregnancy  Shingles Vaccine (Shingrix) - COST NOT COVERED BY MEDICARE PART B  2 shot series recommended in those aged 48 and above     Health Maintenance Due:      Topic Date Due   • Breast Cancer Screening: Mammogram  01/26/2023   • Colorectal Cancer Screening  07/09/2024   • Hepatitis C Screening  Completed     Immunizations Due:      Topic Date Due   • COVID-19 Vaccine (4 - Booster for Pfizer series) 05/28/2022   • Influenza Vaccine (1) 09/01/2022     Advance Directives   What are advance directives? Advance directives are legal documents that state your wishes and plans for medical care  These plans are made ahead of time in case you lose your ability to make decisions for yourself   Advance directives can apply to any medical decision, such as the treatments you want, and if you want to donate organs  What are the types of advance directives? There are many types of advance directives, and each state has rules about how to use them  You may choose a combination of any of the following:  · Living will: This is a written record of the treatment you want  You can also choose which treatments you do not want, which to limit, and which to stop at a certain time  This includes surgery, medicine, IV fluid, and tube feedings  · Durable power of  for healthcare [facility]): This is a written record that states who you want to make healthcare choices for you when you are unable to make them for yourself  This person, called a proxy, is usually a family member or a friend  You may choose more than 1 proxy  · Do not resuscitate (DNR) order:  A DNR order is used in case your heart stops beating or you stop breathing  It is a request not to have certain forms of treatment, such as CPR  A DNR order may be included in other types of advance directives  · Medical directive: This covers the care that you want if you are in a coma, near death, or unable to make decisions for yourself  You can list the treatments you want for each condition  Treatment may include pain medicine, surgery, blood transfusions, dialysis, IV or tube feedings, and a ventilator (breathing machine)  · Values history: This document has questions about your views, beliefs, and how you feel and think about life  This information can help others choose the care that you would choose  Why are advance directives important? An advance directive helps you control your care  Although spoken wishes may be used, it is better to have your wishes written down  Spoken wishes can be misunderstood, or not followed  Treatments may be given even if you do not want them  An advance directive may make it easier for your family to make difficult choices about your care     Urinary Incontinence   Urinary incontinence (UI)  is when you lose control of your bladder  UI develops because your bladder cannot store or empty urine properly  The 3 most common types of UI are stress incontinence, urge incontinence, or both  Medicines:   · May be given to help strengthen your bladder control  Report any side effects of medication to your healthcare provider  Do pelvic muscle exercises often:  Your pelvic muscles help you stop urinating  Squeeze these muscles tight for 5 seconds, then relax for 5 seconds  Gradually work up to squeezing for 10 seconds  Do 3 sets of 15 repetitions a day, or as directed  This will help strengthen your pelvic muscles and improve bladder control  Train your bladder:  Go to the bathroom at set times, such as every 2 hours, even if you do not feel the urge to go  You can also try to hold your urine when you feel the urge to go  For example, hold your urine for 5 minutes when you feel the urge to go  As that becomes easier, hold your urine for 10 minutes  Self-care:   · Keep a UI record  Write down how often you leak urine and how much you leak  Make a note of what you were doing when you leaked urine  · Drink liquids as directed  You may need to limit the amount of liquid you drink to help control your urine leakage  Do not drink any liquid right before you go to bed  Limit or do not have drinks that contain caffeine or alcohol  · Prevent constipation  Eat a variety of high-fiber foods  Good examples are high-fiber cereals, beans, vegetables, and whole-grain breads  Walking is the best way to trigger your intestines to have a bowel movement  · Exercise regularly and maintain a healthy weight  Weight loss and exercise will decrease pressure on your bladder and help you control your leakage  · Use a catheter as directed  to help empty your bladder  A catheter is a tiny, plastic tube that is put into your bladder to drain your urine  · Go to behavior therapy as directed    Behavior therapy may be used to help you learn to control your urge to urinate  Weight Management   Why it is important to manage your weight:  Being overweight increases your risk of health conditions such as heart disease, high blood pressure, type 2 diabetes, and certain types of cancer  It can also increase your risk for osteoarthritis, sleep apnea, and other respiratory problems  Aim for a slow, steady weight loss  Even a small amount of weight loss can lower your risk of health problems  How to lose weight safely:  A safe and healthy way to lose weight is to eat fewer calories and get regular exercise  You can lose up about 1 pound a week by decreasing the number of calories you eat by 500 calories each day  Healthy meal plan for weight management:  A healthy meal plan includes a variety of foods, contains fewer calories, and helps you stay healthy  A healthy meal plan includes the following:  · Eat whole-grain foods more often  A healthy meal plan should contain fiber  Fiber is the part of grains, fruits, and vegetables that is not broken down by your body  Whole-grain foods are healthy and provide extra fiber in your diet  Some examples of whole-grain foods are whole-wheat breads and pastas, oatmeal, brown rice, and bulgur  · Eat a variety of vegetables every day  Include dark, leafy greens such as spinach, kale, rosaline greens, and mustard greens  Eat yellow and orange vegetables such as carrots, sweet potatoes, and winter squash  · Eat a variety of fruits every day  Choose fresh or canned fruit (canned in its own juice or light syrup) instead of juice  Fruit juice has very little or no fiber  · Eat low-fat dairy foods  Drink fat-free (skim) milk or 1% milk  Eat fat-free yogurt and low-fat cottage cheese  Try low-fat cheeses such as mozzarella and other reduced-fat cheeses  · Choose meat and other protein foods that are low in fat  Choose beans or other legumes such as split peas or lentils   Choose fish, skinless poultry (chicken or turkey), or lean cuts of red meat (beef or pork)  Before you cook meat or poultry, cut off any visible fat  · Use less fat and oil  Try baking foods instead of frying them  Add less fat, such as margarine, sour cream, regular salad dressing and mayonnaise to foods  Eat fewer high-fat foods  Some examples of high-fat foods include french fries, doughnuts, ice cream, and cakes  · Eat fewer sweets  Limit foods and drinks that are high in sugar  This includes candy, cookies, regular soda, and sweetened drinks  Exercise:  Exercise at least 30 minutes per day on most days of the week  Some examples of exercise include walking, biking, dancing, and swimming  You can also fit in more physical activity by taking the stairs instead of the elevator or parking farther away from stores  Ask your healthcare provider about the best exercise plan for you  © Copyright [employer] 2018 Information is for End User's use only and may not be sold, redistributed or otherwise used for commercial purposes   All illustrations and images included in CareNotes® are the copyrighted property of [employer]  or [address] [place] [address]

## 2022-11-02 NOTE — PROGRESS NOTES
Assessment and Plan:     Problem List Items Addressed This Visit        Digestive    Reflux esophagitis       Endocrine    Hypothyroidism, adult - Primary       Cardiovascular and Mediastinum    Essential hypertension       Musculoskeletal and Integument    Arthritis of knee    Arthritis      Other Visit Diagnoses     Primary hypertension               Preventive health issues were discussed with patient, and age appropriate screening tests were ordered as noted in patient's After Visit Summary  Personalized health advice and appropriate referrals for health education or preventive services given if needed, as noted in patient's After Visit Summary  History of Present Illness:     Patient presents for a Medicare Wellness Visit    Pt presents for AWV and f/u   esophophagitis much improved with nexium   No further palpitations   Pt reports arthritis is worse - taking tylenol and advil for arthritis  Worse joint pain in fingers, knees and back     C/o b/l cerumen impaction        Patient Care Team:  To Gong MD as PCP - General  Lila Matters Tien Corcoran MD (Gastroenterology)  Laura Mccormack MD (Ophthalmology)  Angie Thorne MD (Cardiology)  Chana Seip, MD (Breast Surgery)  Fito Live MD (Obstetrics and Gynecology)  Lachelle Guerrero MD (Dermatology)  Sam Tripathi DO (Orthopedic Surgery)  uGrjit Vera MD     Review of Systems:     Review of Systems   Constitutional: Negative for activity change, appetite change, chills, fatigue and fever  HENT: Negative for congestion and sore throat  Eyes: Negative for pain and redness  Respiratory: Negative for cough and shortness of breath  Gastrointestinal: Negative for abdominal pain, constipation, diarrhea and nausea  Musculoskeletal: Positive for arthralgias and back pain  Skin: Negative for rash  Neurological: Negative for dizziness and headaches  Psychiatric/Behavioral: The patient is nervous/anxious           Problem List:     Patient Active Problem List   Diagnosis   • Lower abdominal pain   • Allergic sinusitis   • Arthritis of knee   • Hyperlipidemia   • Hypothyroidism, adult   • Viral URI   • Reflux esophagitis   • Gastroesophageal reflux disease without esophagitis   • Paronychia of toenail, left   • Cystitis   • Essential hypertension   • Arthritis   • Painful urination   • Paroxysmal atrial tachycardia (HCC)   • Obesity, morbid (HCC)   • Nausea and vomiting      Past Medical and Surgical History:     Past Medical History:   Diagnosis Date   • Allergic Unknown   • Anxiety     Last Assessed: [id number]   • Arthritis     Last Assessed: [id number]   • Colon polyp    • Disease of thyroid gland    • GERD (gastroesophageal reflux disease)     Last Assessed: 31Oct2017   • Headache(784 0) Used ri have migraines in the 70’s abd early 80’s   • Hypothyroidism, adult 10/31/2017   • Insomnia    • Osteoporosis     Last Assessed: [id number]   • Reflux esophagitis    • Seasonal allergies    • Urinary incontinence     Last Assessed: 31Oct2017     Past Surgical History:   Procedure Laterality Date   • BREAST SURGERY     • CHOLECYSTECTOMY     • COLONOSCOPY     • EXPLORATORY LAPAROTOMY     • GALLBLADDER SURGERY      Last Assessed: [id number]   • INCISIONAL BREAST BIOPSY      Last Assessed: [id number]   • JOINT REPLACEMENT     • KNEE SURGERY      Last Assessed: 31Oct2017   • VT IMPLANT PERIPH/GASTRIC NEUROSTIM/ N/A 3/23/2016    Procedure:  REMOVAL IPG BATTERY ;  Surgeon: Michelle Munoz MD;  Location: [facility];  Service: UroGynecology          • REPLACEMENT TOTAL KNEE Right 04/29/2019   • ROOT CANAL  03/11/2019   • SHOULDER SURGERY Left    • SHOULDER SURGERY      Last Assessed: 31Oct2017   • TONSILLECTOMY      last Assessed: 31Oct2017   • US GUIDED INJECTION FOR RESEARCH STUDY  10/3/2014   • WISDOM TOOTH EXTRACTION      Last Assessed: 31Oct2017      Family History:     Family History   Problem Relation Age of Onset   • Lupus Mother    • Other Mother         Cardiac Disorder   • Heart disease Mother    • Arthritis Mother            • Other Father         Cardiac Disorder   • Diabetes Father         adult diabetes   • Heart disease Father    • Breast cancer Maternal Aunt    • Breast cancer Cousin    • Vision loss Maternal Aunt       Social History:     Social History     Socioeconomic History   • Marital status:      Spouse name: None   • Number of children: None   • Years of education: None   • Highest education level: None   Occupational History   • None   Tobacco Use   • Smoking status: Never Smoker   • Smokeless tobacco: Never Used   Vaping Use   • Vaping Use: Never used   Substance and Sexual Activity   • Alcohol use: Yes     Comment: social drinking only   • Drug use: Never   • Sexual activity: Yes     Partners: Male     Birth control/protection: Post-menopausal, None   Other Topics Concern   • None   Social History Narrative   • None     Social Determinants of Health     Financial Resource Strain: Low Risk    • Difficulty of Paying Living Expenses: Not hard at all   Food Insecurity: Not on file   Transportation Needs: No Transportation Needs   • Lack of Transportation (Medical): No   • Lack of Transportation (Non-Medical): No   Physical Activity: Not on file   Stress: Not on file   Social Connections: Not on file   Intimate Partner Violence: Not on file   Housing Stability: Not on file      Medications and Allergies:     Current Outpatient Medications   Medication Sig Dispense Refill   • aspirin 81 MG tablet Take 81 mg by mouth daily       • cholecalciferol (VITAMIN D3) 1,000 units tablet Take 1,000 Units by mouth daily      • cyanocobalamin (VITAMIN B-12) 1,000 mcg tablet Take 1,000 mcg by mouth daily     • Diclofenac Sodium (VOLTAREN) 1 % APPLY 4 G TOPICALLY 2 (TWO) TIMES A DAY AS NEEDED (KNEE PAIN) 100 g 1   • Elderberry 500 MG CAPS      • esomeprazole (NexIUM) 20 mg capsule Take 20 mg by mouth 2 (two) times a day      • ezetimibe (ZETIA) 10 mg tablet TAKE 1 TABLET BY MOUTH DAILY 90 tablet 1   • famotidine (PEPCID) 20 mg tablet Take 1 tablet (20 mg total) by mouth 2 (two) times a day 60 tablet 11   • fluticasone (FLONASE) 50 mcg/act nasal spray 1 spray into each nostril daily 16 mL 5   • levothyroxine 25 mcg tablet Take 1 tablet (25 mcg total) by mouth daily 90 tablet 1   • LORazepam (ATIVAN) 0 5 mg tablet Take 1 tablet (0 5 mg total) by mouth daily at bedtime 30 tablet 0   • naproxen sodium (ALEVE) 220 MG tablet Take 220 mg by mouth daily as needed       • NON FORMULARY CBD cream, applies to B/L knees     • nystatin (MYCOSTATIN) cream Apply topically 2 (two) times a day 30 g 2   • nystatin (MYCOSTATIN) powder Apply topically 2 (two) times a day 60 g 2   • Olopatadine HCl (PATADAY OP) Apply to eye     • rosuvastatin (CRESTOR) 10 MG tablet Take 1 tablet (10 mg total) by mouth 3 (three) times a week Use three times a week 36 tablet 3   • terbinafine (LamISIL) 1 % cream Apply topically 2 (two) times a day 30 g 0   • triamcinolone (KENALOG) 0 1 % cream Apply topically 2 (two) times a day 45 g 0   • Zinc 100 MG TABS prn       No current facility-administered medications for this visit       Allergies   Allergen Reactions   • Acetazolamide Hives   • Fluticasone GI Intolerance   • Cephalexin Itching   • Sulfa Antibiotics Rash, Edema and Swelling      Immunizations:     Immunization History   Administered Date(s) Administered   • COVID-19 PFIZER VACCINE 0 3 ML IM 02/15/2021, 03/08/2021   • COVID-19 Pfizer vac (Bennie-sucrose, gray cap) 12 yr+ IM 01/28/2022   • Influenza Split High Dose Preservative Free IM 10/31/2017, 12/14/2020   • Influenza, high dose seasonal 0 7 mL 09/20/2018, 11/26/2019, 12/14/2020, 10/06/2021   • Pneumococcal Conjugate 13-Valent 11/01/2018   • Pneumococcal Polysaccharide PPV23 12/11/2019   • influenza, trivalent, adjuvanted 09/20/2018, 11/26/2019, 12/14/2020, 10/06/2021      Health Maintenance:         Topic Date Due   • Breast Cancer Screening: Mammogram  01/26/2023   • Colorectal Cancer Screening  07/09/2024   • Hepatitis C Screening  Completed         Topic Date Due   • COVID-19 Vaccine (4 - Booster for Pfizer series) 05/28/2022   • Influenza Vaccine (1) 09/01/2022      Medicare Screening Tests and Risk Assessments:     Emily Lainez is here for her Subsequent Wellness visit  Last Medicare Wellness visit information reviewed, patient interviewed and updates made to the record today  Health Risk Assessment:   Patient rates overall health as good  Patient feels that their physical health rating is same  Patient is very satisfied with their life  Eyesight was rated as same  Hearing was rated as same  Patient feels that their emotional and mental health rating is same  Patients states they are never, rarely angry  Patient states they are often unusually tired/fatigued  Pain experienced in the last 7 days has been some  Patient's pain rating has been 6/10  Patient states that she has experienced no weight loss or gain in last 6 months  Fall Risk Screening: In the past year, patient has experienced: no history of falling in past year      Urinary Incontinence Screening:   Patient has leaked urine accidently in the last six months  I have incontinence and have undergone treatnent for such    Home Safety:  Patient has trouble with stairs inside or outside of their home  Patient has working smoke alarms and has working carbon monoxide detector  Home safety hazards include: none  Knee pain makes steps somewhat difficult at times    Nutrition:   Current diet is Regular  Medications:   Patient is currently taking over-the-counter supplements  OTC medications include: Vitamins B, C, D, zinc, and elderberry  Patient is able to manage medications       Activities of Daily Living (ADLs)/Instrumental Activities of Daily Living (IADLs):   Walk and transfer into and out of bed and chair?: Yes  Dress and groom yourself?: Yes    Bathe or shower yourself?: Yes    Feed yourself? Yes  Do your laundry/housekeeping?: Yes  Manage your money, pay your bills and track your expenses?: Yes  Make your own meals?: Yes    Do your own shopping?: Yes    Previous Hospitalizations:   Any hospitalizations or ED visits within the last 12 months?: Yes    How many hospitalizations have you had in the last year?: 1-2    Hospitalization Comments: Kidney stone and chest discomfort    Advance Care Planning:   Living will: Yes    Durable POA for healthcare: Yes    Advanced directive: Yes      Cognitive Screening:   Provider or family/friend/caregiver concerned regarding cognition?: No    PREVENTIVE SCREENINGS      Cardiovascular Screening:    General: Screening Not Indicated and History Lipid Disorder      Diabetes Screening:     General: Screening Current      Colorectal Cancer Screening:     General: Screening Current      Breast Cancer Screening:     General: Screening Current      Cervical Cancer Screening:    General: Screening Not Indicated      Osteoporosis Screening:    General: Screening Current      Abdominal Aortic Aneurysm (AAA) Screening:        General: Screening Not Indicated      Lung Cancer Screening:     General: Screening Not Indicated      Hepatitis C Screening:    General: Screening Current    Screening, Brief Intervention, and Referral to Treatment (SBIRT)    Screening  Typical number of drinks in a day: 0  Typical number of drinks in a week: 0  Interpretation: Low risk drinking behavior  AUDIT-C Screenin) How often did you have a drink containing alcohol in the past year? monthly or less  2) How many drinks did you have on a typical day when you were drinking in the past year?  1 to 2  3) How often did you have 6 or more drinks on one occasion in the past year? never    AUDIT-C Score: 1  Interpretation: Score 0-2 (female): Negative screen for alcohol misuse    Single Item Drug Screening:  How often have you used an illegal drug (including marijuana) or a prescription medication for non-medical reasons in the past year? never    Single Item Drug Screen Score: 0  Interpretation: Negative screen for possible drug use disorder    Brief Intervention  Alcohol & drug use screenings were reviewed  No concerns regarding substance use disorder identified  No exam data present     Physical Exam:     There were no vitals taken for this visit  Physical Exam  Vitals reviewed  Constitutional:       Appearance: She is obese  HENT:      Head: Normocephalic and atraumatic  Right Ear: There is impacted cerumen  Left Ear: There is impacted cerumen  Nose: Nose normal    Eyes:      Extraocular Movements: Extraocular movements intact  Conjunctiva/sclera: Conjunctivae normal       Pupils: Pupils are equal, round, and reactive to light  Cardiovascular:      Rate and Rhythm: Normal rate and regular rhythm  Heart sounds: No murmur heard  Pulmonary:      Effort: Pulmonary effort is normal  No respiratory distress  Breath sounds: Normal breath sounds  No wheezing or rhonchi  Musculoskeletal:         General: Swelling (b/l knees - tender ) and deformity (b/l heberden nodes ) present  Right lower leg: No edema  Left lower leg: No edema  Skin:     Findings: Rash present  Neurological:      General: No focal deficit present  Mental Status: She is alert and oriented to person, place, and time  Motor: No weakness        Deep Tendon Reflexes: Reflexes normal    Psychiatric:         Mood and Affect: Mood normal          Behavior: Behavior normal           Rubens Warren MD

## 2022-11-02 NOTE — PROGRESS NOTES
Name: Dominga Stein      : 1950      MRN: [id number]  Encounter Provider: Michelle Garcia MD  Encounter Date: 2022   Encounter department: [address] [place] [address]     1  Hypothyroidism, adult    2  Gastroesophageal reflux disease with esophagitis without hemorrhage    3  Essential hypertension    4  Arthritis of knee    5  Arthritis    6  Primary hypertension           Subjective      Pt under more stress lately due to illness of      Review of Systems    Current Outpatient Medications on File Prior to Visit   Medication Sig   • aspirin 81 MG tablet Take 81 mg by mouth daily     • cholecalciferol (VITAMIN D3) 1,000 units tablet Take 1,000 Units by mouth daily    • cyanocobalamin (VITAMIN B-12) 1,000 mcg tablet Take 1,000 mcg by mouth daily   • Diclofenac Sodium (VOLTAREN) 1 % APPLY 4 G TOPICALLY 2 (TWO) TIMES A DAY AS NEEDED (KNEE PAIN)   • Elderberry 500 MG CAPS    • esomeprazole (NexIUM) 20 mg capsule Take 20 mg by mouth 2 (two) times a day    • ezetimibe (ZETIA) 10 mg tablet TAKE 1 TABLET BY MOUTH DAILY   • famotidine (PEPCID) 20 mg tablet Take 1 tablet (20 mg total) by mouth 2 (two) times a day   • fluticasone (FLONASE) 50 mcg/act nasal spray 1 spray into each nostril daily   • levothyroxine 25 mcg tablet Take 1 tablet (25 mcg total) by mouth daily   • LORazepam (ATIVAN) 0 5 mg tablet Take 1 tablet (0 5 mg total) by mouth daily at bedtime   • naproxen sodium (ALEVE) 220 MG tablet Take 220 mg by mouth daily as needed     • NON FORMULARY CBD cream, applies to B/L knees   • nystatin (MYCOSTATIN) cream Apply topically 2 (two) times a day   • nystatin (MYCOSTATIN) powder Apply topically 2 (two) times a day   • Olopatadine HCl (PATADAY OP) Apply to eye   • rosuvastatin (CRESTOR) 10 MG tablet Take 1 tablet (10 mg total) by mouth 3 (three) times a week Use three times a week   • terbinafine (LamISIL) 1 % cream Apply topically 2 (two) times a day   • triamcinolone (KENALOG) 0 1 % cream Apply topically 2 (two) times a day   • Zinc 100 MG TABS prn   • [DISCONTINUED] albuterol (ProAir HFA) 90 mcg/act inhaler Inhale 2 puffs every 6 (six) hours as needed for wheezing   • [DISCONTINUED] gabapentin (Neurontin) 100 mg capsule Take 1 capsule (100 mg total) by mouth daily at bedtime (Patient not taking: Reported on 5/25/2022)   • [DISCONTINUED] predniSONE 20 mg tablet Take 0 5 tablets (10 mg total) by mouth 2 (two) times a day with meals   • [DISCONTINUED] Probiotic Product (ALIGN PO) Take by mouth daily       Objective     /74 (BP Location: Left arm, Patient Position: Sitting, Cuff Size: Adult)   Pulse 65   Temp 98 3 °F (36 8 °C) (Temporal)   Ht 5' 2" (1 575 m)   Wt 85 3 kg (188 lb)   SpO2 98% Comment: ra  BMI 34 39 kg/m²     Physical Exam  Jordin Clark MD  Answers for HPI/ROS submitted by the patient on 10/26/2022  How would you rate your overall health?: good  Compared to last year, how is your physical health?: same  In general, how satisfied are you with your life?: very satisfied  Compared to last year, how is your eyesight?: same  Compared to last year, how is your hearing?: same  Compared to last year, how is your emotional/mental health?: same  How often is anger a problem for you?: never, rarely  How often do you feel unusually tired/fatigued?: often  In the past 7 days, how much pain have you experienced?: some  If you answered "some" or "a lot", please rate the severity of your pain on a scale of 1 to 10 (1 being the least severe pain and 10 being the most intense pain)  : 6/10  In the past 6 months, have you lost or gained 10 pounds without trying?: No  One or more falls in the last year: No  In the past 6 months, have you accidentally leaked urine?: Yes  Additional Comments: I have incontinence and have undergone treatnent for such  Do you have trouble with the stairs inside or outside your home?: Yes  Does your home have working smoke alarms?: Yes  Does your home have a carbon monoxide monitor?: Yes  Which safety hazards (if any) have you experienced in your home? Please select all that apply : none  Additional Comments: Knee pain makes steps somewhat difficult at times  How would you describe your current diet?  Please select all that apply : Regular  In addition to prescription medications, are you taking any over-the-counter supplements?: Yes  If yes, what supplements are you taking?: Vitamins B, C, D, zinc, and elderberry  Can you manage your medications?: Yes  Are you currently taking any opioid medications?: No  Can you walk and transfer into and out of your bed and chair?: Yes  Can you dress and groom yourself?: Yes  Can you bathe or shower yourself?: Yes  Can you feed yourself?: Yes  Can you do your laundry/ housekeeping?: Yes  Can you manage your money, pay your bills, and track your expenses?: Yes  Can you make your own meals?: Yes  Can you do your own shopping?: Yes  Within the last 12 months, have you had any hospitalizations or Emergency Department visits?: Yes  If yes, how many times have you been hospitalized within the past year?: 1-2  Additional Comments: Kidney stone and chest discomfort  Do you have a living will?: Yes  Do you have a Durable POA (Power of ) for healthcare decisions?: Yes  Do you have an Advanced Directive for end of life decisions?: Yes  How often have you used an illegal drug (including marijuana) or a prescription medication for non-medical reasons in the past year?: never  What is the typical number of drinks you consume in a day?: 0  What is the typical number of drinks you consume in a week?: 0  How often did you have a drink containing alcohol in the past year?: monthly or less  How many drinks did you have on a typical day  when you were drinking in the past year?: 1 to 2  How often did you have 6 or more drinks on one occasion in the past year?: never

## 2022-11-10 ENCOUNTER — OFFICE VISIT (OUTPATIENT)
Dept: GASTROENTEROLOGY | Facility: CLINIC | Age: 72
End: 2022-11-10

## 2022-11-10 VITALS
DIASTOLIC BLOOD PRESSURE: 86 MMHG | BODY MASS INDEX: 34.6 KG/M2 | TEMPERATURE: 97.5 F | WEIGHT: 188 LBS | OXYGEN SATURATION: 97 % | HEIGHT: 62 IN | HEART RATE: 74 BPM | SYSTOLIC BLOOD PRESSURE: 141 MMHG

## 2022-11-10 DIAGNOSIS — K21.9 GASTROESOPHAGEAL REFLUX DISEASE WITHOUT ESOPHAGITIS: ICD-10-CM

## 2022-11-10 DIAGNOSIS — K62.5 BRBPR (BRIGHT RED BLOOD PER RECTUM): ICD-10-CM

## 2022-11-10 DIAGNOSIS — K62.89 RECTAL PAIN: ICD-10-CM

## 2022-11-10 DIAGNOSIS — R19.4 CHANGE IN BOWEL HABITS: Primary | ICD-10-CM

## 2022-11-10 NOTE — ASSESSMENT & PLAN NOTE
Probable physiologic changes    Rule out colorectal lesions     -advised to take stool softeners and MiraLax regularly    -colonoscopy

## 2022-11-10 NOTE — ASSESSMENT & PLAN NOTE
Patient with history of chronic GERD and had upper endoscopy earlier this year     -Patient was explained about the lifestyle and dietary modifications  Advised to avoid fatty foods, chocolates, caffeine, alcohol and any other triggering foods  Avoid eating for at least 3 hours before going to bed         -continue Nexium since patient could not taper off  She understands the long-term side effects

## 2022-11-10 NOTE — PATIENT INSTRUCTIONS
Scheduled date of colonoscopy (as of today): 1/20/23  Physician performing colonoscopy: Dr Romel Miller  Location of colonoscopy: [facility]  Bowel prep reviewed with patient: Golytely  Instructions reviewed with patient by: Mello LAM  Clearances: n/a

## 2022-11-10 NOTE — PROGRESS NOTES
Follow-up Note -  [facility]  Clare Taylor 1950 female         Reason:  Change in bowel habits and rectal bleeding    HPI:  Ms  Ronan Higgins came in because of some fresh bleeding from the rectum when she wipes for about a month  Complaining about straining hard at times with hard bowel movements but she tries to have a bowel movement every day even though she spends more time  Denies abdominal pain, nausea vomiting  Good appetite, no recent weight loss  She has problems with acid reflux for which she takes Nexium at night and Pepcid in the morning  She tried to taper off the Nexium but had more recurrent symptoms  She had colonoscopy in 2014 and had a polyp removed  She had negative stool cologuard test in April 2020  Chaperon: Ms Tanya Hull: Review of Systems   Constitutional: Negative for activity change, appetite change, chills, diaphoresis, fatigue, fever and unexpected weight change  HENT: Negative for ear discharge, ear pain, facial swelling, hearing loss, nosebleeds, sore throat, tinnitus and voice change  Eyes: Negative for pain, discharge, redness, itching and visual disturbance  Respiratory: Negative for apnea, cough, chest tightness, shortness of breath and wheezing  Cardiovascular: Negative for chest pain and palpitations  Gastrointestinal:        As noted in HPI   Endocrine: Negative for cold intolerance, heat intolerance and polyuria  Genitourinary: Positive for frequency  Negative for difficulty urinating, dysuria, flank pain, hematuria and urgency  Musculoskeletal: Positive for arthralgias  Negative for back pain, gait problem, joint swelling and myalgias  Skin: Negative for rash and wound  Neurological: Negative for dizziness, tremors, seizures, speech difficulty, light-headedness, numbness and headaches  Hematological: Negative for adenopathy  Does not bruise/bleed easily     Psychiatric/Behavioral: Negative for agitation, behavioral problems and confusion  The patient is not nervous/anxious           Past Medical History:   Diagnosis Date   • Allergic Unknown   • Anxiety     Last Assessed: [id number]   • Arthritis     Last Assessed: [id number]   • Colon polyp    • Disease of thyroid gland    • GERD (gastroesophageal reflux disease)     Last Assessed: 31Oct2017   • Headache(784 0) Used ri have migraines in the 70’s abd early 80’s   • Hypothyroidism, adult 10/31/2017   • Insomnia    • Osteoporosis     Last Assessed: [id number]   • Reflux esophagitis    • Seasonal allergies    • Urinary incontinence     Last Assessed: 31Oct2017      Past Surgical History:   Procedure Laterality Date   • BREAST SURGERY     • CHOLECYSTECTOMY     • COLONOSCOPY     • EXPLORATORY LAPAROTOMY     • GALLBLADDER SURGERY      Last Assessed: [id number]   • INCISIONAL BREAST BIOPSY      Last Assessed: [id number]   • JOINT REPLACEMENT     • KNEE SURGERY      Last Assessed: 31Oct2017   • [place] IMPLANT PERIPH/GASTRIC NEUROSTIM/ N/A 3/23/2016    Procedure:  REMOVAL IPG BATTERY ;  Surgeon: Ricardo Craven MD;  Location: [facility];  Service: UroGynecology          • REPLACEMENT TOTAL KNEE Right 04/29/2019   • ROOT CANAL  03/11/2019   • SHOULDER SURGERY Left    • SHOULDER SURGERY      Last Assessed: 31Oct2017   • TONSILLECTOMY      last Assessed: 31Oct2017   • [address] STUDY  10/3/2014   • WISDOM TOOTH EXTRACTION      Last Assessed: 31Oct2017     Social History     Socioeconomic History   • Marital status:      Spouse name: Not on file   • Number of children: Not on file   • Years of education: Not on file   • Highest education level: Not on file   Occupational History   • Not on file   Tobacco Use   • Smoking status: Never Smoker   • Smokeless tobacco: Never Used   Vaping Use   • Vaping Use: Never used   Substance and Sexual Activity   • Alcohol use: Yes     Comment: social drinking only   • Drug use: Never   • Sexual activity: Yes Partners: Male     Birth control/protection: Post-menopausal, None   Other Topics Concern   • Not on file   Social History Narrative   • Not on file     Social Determinants of Health     Financial Resource Strain: Low Risk    • Difficulty of Paying Living Expenses: Not hard at all   Food Insecurity: Not on file   Transportation Needs: No Transportation Needs   • Lack of Transportation (Medical): No   • Lack of Transportation (Non-Medical): No   Physical Activity: Not on file   Stress: Not on file   Social Connections: Not on file   Intimate Partner Violence: Not on file   Housing Stability: Not on file     Family History   Problem Relation Age of Onset   • Lupus Mother    • Other Mother         Cardiac Disorder   • Heart disease Mother    • Arthritis Mother            • Other Father         Cardiac Disorder   • Diabetes Father         adult diabetes   • Heart disease Father    • Breast cancer Maternal Aunt    • Breast cancer Cousin    • Vision loss Maternal Aunt      Acetazolamide, Fluticasone, Cephalexin, and Sulfa antibiotics  Current Outpatient Medications   Medication Sig Dispense Refill   • Acetaminophen (TYLENOL 8 HOUR ARTHRITIS PAIN PO) Take by mouth As needed     • aspirin 81 MG tablet Take 81 mg by mouth daily       • bisacodyl (DULCOLAX) 5 mg EC tablet Take 2 tablets (10 mg total) by mouth once for 1 dose 2 tablet 0   • cholecalciferol (VITAMIN D3) 1,000 units tablet Take 1,000 Units by mouth daily      • cyanocobalamin (VITAMIN B-12) 1,000 mcg tablet Take 1,000 mcg by mouth daily     • Diclofenac Sodium (VOLTAREN) 1 % APPLY 4 G TOPICALLY 2 (TWO) TIMES A DAY AS NEEDED (KNEE PAIN) 100 g 1   • Elderberry 500 MG CAPS      • esomeprazole (NexIUM) 20 mg capsule Take 20 mg by mouth 2 (two) times a day      • ezetimibe (ZETIA) 10 mg tablet TAKE 1 TABLET BY MOUTH DAILY 90 tablet 1   • famotidine (PEPCID) 20 mg tablet Take 1 tablet (20 mg total) by mouth 2 (two) times a day 60 tablet 11   • fluticasone (FLONASE) 50 mcg/act nasal spray 1 spray into each nostril daily 16 mL 5   • levothyroxine 25 mcg tablet Take 1 tablet (25 mcg total) by mouth daily 90 tablet 1   • LORazepam (ATIVAN) 0 5 mg tablet Take 1 tablet (0 5 mg total) by mouth daily at bedtime (Patient taking differently: Take 0 5 mg by mouth daily at bedtime as needed) 30 tablet 0   • naproxen sodium (ALEVE) 220 MG tablet Take 220 mg by mouth daily as needed       • NON FORMULARY CBD cream, applies to B/L knees     • nystatin (MYCOSTATIN) cream Apply topically 2 (two) times a day 30 g 2   • nystatin (MYCOSTATIN) powder Apply topically 2 (two) times a day 60 g 2   • Olopatadine HCl (PATADAY OP) Apply to eye     • polyethylene glycol (GOLYTELY) 4000 mL solution Take 4,000 mL by mouth once for 1 dose 4000 mL 0   • rosuvastatin (CRESTOR) 10 MG tablet Take 1 tablet (10 mg total) by mouth 3 (three) times a week Use three times a week 36 tablet 3   • terbinafine (LamISIL) 1 % cream Apply topically 2 (two) times a day 30 g 0   • triamcinolone (KENALOG) 0 1 % cream Apply topically 2 (two) times a day 45 g 0   • Zinc 100 MG TABS prn       No current facility-administered medications for this visit  Blood pressure 141/86, pulse 74, temperature 97 5 °F (36 4 °C), height 5' 2" (1 575 m), weight 85 3 kg (188 lb), SpO2 97 %, currently breastfeeding  PHYSICAL EXAM: Physical Exam  Constitutional:       Appearance: Normal appearance  She is well-developed  HENT:      Head: Normocephalic and atraumatic  Nose: Nose normal    Eyes:      Conjunctiva/sclera: Conjunctivae normal    Neck:      Thyroid: No thyromegaly  Vascular: No JVD  Trachea: No tracheal deviation  Cardiovascular:      Rate and Rhythm: Normal rate and regular rhythm  Heart sounds: Normal heart sounds  No murmur heard  No friction rub  No gallop  Pulmonary:      Effort: Pulmonary effort is normal  No respiratory distress  Breath sounds: Normal breath sounds   No wheezing or rales  Abdominal:      General: Bowel sounds are normal  There is no distension  Palpations: Abdomen is soft  There is no mass  Tenderness: There is no abdominal tenderness  There is no guarding  Hernia: No hernia is present  Musculoskeletal:         General: No tenderness or deformity  Cervical back: Neck supple  Right lower leg: No edema  Left lower leg: No edema  Lymphadenopathy:      Cervical: No cervical adenopathy  Skin:     General: Skin is warm and dry  Findings: No erythema or rash  Neurological:      Mental Status: She is alert and oriented to person, place, and time  Psychiatric:         Mood and Affect: Mood normal          Behavior: Behavior normal          Thought Content: Thought content normal           Lab Results   Component Value Date    WBC 8 2 05/13/2022    HGB 12 7 05/13/2022    HCT 38 6 05/13/2022    MCV 82 3 05/13/2022     05/13/2022     Lab Results   Component Value Date    CALCIUM 8 9 05/13/2022    K 4 1 05/13/2022    CO2 27 05/13/2022     05/13/2022    BUN 21 05/13/2022    CREATININE 0 71 05/13/2022     Lab Results   Component Value Date    ALT 18 12/20/2021    AST 13 12/20/2021    ALKPHOS 113 12/20/2021     No results found for: INR, PROTIME    XR chest pa & lateral    Result Date: 5/28/2022  Impression: No acute cardiopulmonary disease  Workstation performed: [id number]       ASSESSMENT & PLAN:    BRBPR (bright red blood per rectum)  Most likely hemorrhoidal bleeding  Rule out colorectal lesions including polyps or malignancy     -advised to continue with preparation H creams    -avoid straining during defecation    -Schedule for colonoscopy  -High-fiber diet     -Patient was given instructions about the colonoscopy prep     -Patient was explained about  the risks and benefits of the procedure  Risks including but not limited to bleeding, infection, perforation were explained in detail   Also explained about less than 100% sensitivity with the exam and other alternatives  Change in bowel habits  Probable physiologic changes  Rule out colorectal lesions     -advised to take stool softeners and MiraLax regularly    -colonoscopy    Gastroesophageal reflux disease without esophagitis  Patient with history of chronic GERD and had upper endoscopy earlier this year     -Patient was explained about the lifestyle and dietary modifications  Advised to avoid fatty foods, chocolates, caffeine, alcohol and any other triggering foods  Avoid eating for at least 3 hours before going to bed         -continue Nexium since patient could not taper off  She understands the long-term side effects

## 2022-11-10 NOTE — ASSESSMENT & PLAN NOTE
Most likely hemorrhoidal bleeding  Rule out colorectal lesions including polyps or malignancy     -advised to continue with preparation H creams    -avoid straining during defecation    -Schedule for colonoscopy  -High-fiber diet     -Patient was given instructions about the colonoscopy prep     -Patient was explained about  the risks and benefits of the procedure  Risks including but not limited to bleeding, infection, perforation were explained in detail  Also explained about less than 100% sensitivity with the exam and other alternatives

## 2022-12-12 ENCOUNTER — HOSPITAL ENCOUNTER (OUTPATIENT)
Dept: RADIOLOGY | Facility: IMAGING CENTER | Age: 72
Discharge: HOME | End: 2022-12-12

## 2022-12-12 DIAGNOSIS — M85.80 OSTEOPENIA, UNSPECIFIED LOCATION: ICD-10-CM

## 2022-12-12 DIAGNOSIS — Z78.0 POST-MENOPAUSAL: ICD-10-CM

## 2022-12-14 ENCOUNTER — TELEPHONE (OUTPATIENT)
Dept: OTHER | Facility: OTHER | Age: 72
End: 2022-12-14

## 2023-01-11 ENCOUNTER — ANESTHESIA EVENT (OUTPATIENT)
Dept: ANESTHESIOLOGY | Facility: HOSPITAL | Age: 73
End: 2023-01-11

## 2023-01-11 ENCOUNTER — ANESTHESIA (OUTPATIENT)
Dept: ANESTHESIOLOGY | Facility: HOSPITAL | Age: 73
End: 2023-01-11

## 2023-01-11 ENCOUNTER — NURSE TRIAGE (OUTPATIENT)
Dept: OTHER | Facility: OTHER | Age: 73
End: 2023-01-11

## 2023-01-11 NOTE — TELEPHONE ENCOUNTER
Patient is asking if she can do just the "pill prep" she says she gets sick from drinking a liquid prep   Her colonoscopy is 1/20/23 with Dr Richar Coe    Reason for Disposition  • Colonoscopy, questions about    Answer Assessment - Initial Assessment Questions  Can I do a "pill only" bowel prep    Protocols used: COLONOSCOPY SYMPTOMS AND QUESTIONS-ADULT-AH

## 2023-01-11 NOTE — TELEPHONE ENCOUNTER
Can someone call pt and confirm no hx of kidney disease?  Also, please let her know that this likely will not be covered at insurance, if she is agreeable, I can order

## 2023-01-11 NOTE — TELEPHONE ENCOUNTER
Regarding: prep quesitons  ----- Message from Valarie Berry, RN sent at 1/11/2023  2:43 PM EST -----  "I have previously gotten very sick from drinking the prep  It's the same prep that is ordered  Is there a different one or maybe multiple pills I can take instead of this one

## 2023-01-12 DIAGNOSIS — E78.2 MIXED HYPERLIPIDEMIA: ICD-10-CM

## 2023-01-12 DIAGNOSIS — E03.8 SUBCLINICAL HYPOTHYROIDISM: ICD-10-CM

## 2023-01-12 RX ORDER — LEVOTHYROXINE SODIUM 0.03 MG/1
25 TABLET ORAL DAILY
Qty: 90 TABLET | Refills: 1 | Status: SHIPPED | OUTPATIENT
Start: 2023-01-12

## 2023-01-12 RX ORDER — ROSUVASTATIN CALCIUM 10 MG/1
10 TABLET, COATED ORAL 3 TIMES WEEKLY
Qty: 36 TABLET | Refills: 3 | Status: SHIPPED | OUTPATIENT
Start: 2023-01-13

## 2023-01-13 ENCOUNTER — TELEPHONE (OUTPATIENT)
Dept: GASTROENTEROLOGY | Facility: AMBULARY SURGERY CENTER | Age: 73
End: 2023-01-13

## 2023-01-13 NOTE — TELEPHONE ENCOUNTER
----- Message from Truman Norman sent at 1/13/2023  2:27 PM EST -----  Regarding: FW: colonoscopy prep ; IMPORTANT  Contact: [phone]    ----- Message -----  From: Elly Ng  Sent: 1/13/2023   2:19 PM EST  To: [facility]  Subject: colonoscopy prep ; IMPORTANT                     Hello,  I called this week and spoke with the triage nurse in the office  She was to check to see if I could take the pill used for prepping instead of drinking the liquid due to always having extreme nausea from the drink  My test is scheduled for Friday, the 2oth  Please advise   Thank you kindly, Veronica Russ

## 2023-01-16 DIAGNOSIS — K62.5 RECTAL BLEEDING: ICD-10-CM

## 2023-01-16 DIAGNOSIS — R19.4 CHANGE IN BOWEL HABITS: Primary | ICD-10-CM

## 2023-01-16 RX ORDER — SOD SULF/POT CHLORIDE/MAG SULF 1.479 G
TABLET ORAL
Qty: 24 TABLET | Refills: 0 | Status: SHIPPED | OUTPATIENT
Start: 2023-01-16 | End: 2023-01-20 | Stop reason: ALTCHOICE

## 2023-01-16 NOTE — TELEPHONE ENCOUNTER
Patient sent in [place] message confirming she does not have kidney disease  She would like to use Sutab prep instead

## 2023-01-19 RX ORDER — SODIUM CHLORIDE, SODIUM LACTATE, POTASSIUM CHLORIDE, CALCIUM CHLORIDE 600; 310; 30; 20 MG/100ML; MG/100ML; MG/100ML; MG/100ML
75 INJECTION, SOLUTION INTRAVENOUS CONTINUOUS
Status: CANCELLED | OUTPATIENT
Start: 2023-01-19

## 2023-01-20 ENCOUNTER — HOSPITAL ENCOUNTER (OUTPATIENT)
Dept: GASTROENTEROLOGY | Facility: AMBULARY SURGERY CENTER | Age: 73
Setting detail: OUTPATIENT SURGERY
End: 2023-01-20
Attending: INTERNAL MEDICINE

## 2023-01-20 ENCOUNTER — TELEPHONE (OUTPATIENT)
Dept: GASTROENTEROLOGY | Facility: CLINIC | Age: 73
End: 2023-01-20

## 2023-01-20 ENCOUNTER — ANESTHESIA EVENT (OUTPATIENT)
Dept: GASTROENTEROLOGY | Facility: AMBULARY SURGERY CENTER | Age: 73
End: 2023-01-20

## 2023-01-20 ENCOUNTER — TELEPHONE (OUTPATIENT)
Dept: GASTROENTEROLOGY | Facility: AMBULARY SURGERY CENTER | Age: 73
End: 2023-01-20

## 2023-01-20 ENCOUNTER — ANESTHESIA (OUTPATIENT)
Dept: GASTROENTEROLOGY | Facility: AMBULARY SURGERY CENTER | Age: 73
End: 2023-01-20

## 2023-01-20 VITALS
TEMPERATURE: 97.2 F | WEIGHT: 184 LBS | RESPIRATION RATE: 18 BRPM | SYSTOLIC BLOOD PRESSURE: 136 MMHG | OXYGEN SATURATION: 100 % | HEART RATE: 63 BPM | DIASTOLIC BLOOD PRESSURE: 63 MMHG | BODY MASS INDEX: 33.86 KG/M2 | HEIGHT: 62 IN

## 2023-01-20 DIAGNOSIS — K62.5 BRBPR (BRIGHT RED BLOOD PER RECTUM): ICD-10-CM

## 2023-01-20 DIAGNOSIS — R19.4 CHANGE IN BOWEL HABITS: ICD-10-CM

## 2023-01-20 PROBLEM — Z96.651 S/P TOTAL KNEE ARTHROPLASTY, RIGHT: Status: ACTIVE | Noted: 2023-01-20

## 2023-01-20 PROBLEM — J06.9 VIRAL URI: Status: RESOLVED | Noted: 2019-03-14 | Resolved: 2023-01-20

## 2023-01-20 RX ORDER — HYDROCORTISONE ACETATE 25 MG/1
25 SUPPOSITORY RECTAL 2 TIMES DAILY PRN
Qty: 12 SUPPOSITORY | Refills: 1 | Status: SHIPPED | OUTPATIENT
Start: 2023-01-20 | End: 2023-02-08

## 2023-01-20 RX ORDER — PROPOFOL 10 MG/ML
INJECTION, EMULSION INTRAVENOUS AS NEEDED
Status: DISCONTINUED | OUTPATIENT
Start: 2023-01-20 | End: 2023-01-20

## 2023-01-20 RX ORDER — TOBRAMYCIN AND DEXAMETHASONE 3; 1 MG/ML; MG/ML
1 SUSPENSION/ DROPS OPHTHALMIC 4 TIMES DAILY
Status: DISCONTINUED | OUTPATIENT
Start: 2023-01-20 | End: 2023-01-21 | Stop reason: HOSPADM

## 2023-01-20 RX ORDER — MELOXICAM 7.5 MG/1
7.5 TABLET ORAL DAILY
COMMUNITY

## 2023-01-20 RX ORDER — PROPOFOL 10 MG/ML
INJECTION, EMULSION INTRAVENOUS CONTINUOUS PRN
Status: DISCONTINUED | OUTPATIENT
Start: 2023-01-20 | End: 2023-01-20

## 2023-01-20 RX ORDER — SODIUM CHLORIDE, SODIUM LACTATE, POTASSIUM CHLORIDE, CALCIUM CHLORIDE 600; 310; 30; 20 MG/100ML; MG/100ML; MG/100ML; MG/100ML
75 INJECTION, SOLUTION INTRAVENOUS CONTINUOUS
Status: DISCONTINUED | OUTPATIENT
Start: 2023-01-20 | End: 2023-01-24 | Stop reason: HOSPADM

## 2023-01-20 RX ADMIN — PROPOFOL 100 MG: 10 INJECTION, EMULSION INTRAVENOUS at 12:54

## 2023-01-20 RX ADMIN — SODIUM CHLORIDE, SODIUM LACTATE, POTASSIUM CHLORIDE, AND CALCIUM CHLORIDE: .6; .31; .03; .02 INJECTION, SOLUTION INTRAVENOUS at 12:45

## 2023-01-20 RX ADMIN — PROPOFOL 100 MCG/KG/MIN: 10 INJECTION, EMULSION INTRAVENOUS at 12:54

## 2023-01-20 NOTE — TELEPHONE ENCOUNTER
Patient called today regarding she has a Colonoscopy Scheduled for this Morning at 11:15 am  Patient Vomited after taking Sutab Pills  Patient needs to know if she can still have the Procedure  Please call Patient back to Advise

## 2023-01-20 NOTE — TELEPHONE ENCOUNTER
Pt states her stool has been "liquid yellow" in color and will still report for her procedure today 1/20/2023/advised pt to let pre-op nurses know of her vomiting this AM and consistency/color of her stool

## 2023-01-20 NOTE — ANESTHESIA POSTPROCEDURE EVALUATION
Post-Op Assessment Note    CV Status:  Stable  Pain Score: 0    Pain management: adequate     Mental Status:  Alert and awake   Hydration Status:  Stable   PONV Controlled:  None   Airway Patency:  Patent      Post Op Vitals Reviewed: Yes      Staff: CRNA         No notable events documented      BP      Temp     Pulse     Resp      SpO2

## 2023-01-20 NOTE — ADDENDUM NOTE
Addendum  created 01/20/23 1358 by Laury Benavides MD    Order list changed, Pharmacy for encounter modified

## 2023-01-20 NOTE — TELEPHONE ENCOUNTER
Pt has colonoscopy scheduled for today with Dr Garth Ramsey  She said she vomited the dulcolax tabs   Please advise

## 2023-01-20 NOTE — TELEPHONE ENCOUNTER
Spoke with patient, she is having clear liquid BMs and is NPO  Patient will proceed with colonoscopy today  No further questions

## 2023-01-20 NOTE — H&P
History and Physical - [facility]  Lev Malloy 67 y o  female MRN: [id number]        HPI: 40-year-old female with history of anxiety, colon polyps reports having issues with constipation and rectal bleeding      Historical Information   Past Medical History:   Diagnosis Date   • Allergic Unknown   • Anxiety     Last Assessed: [id number]   • Arthritis     Last Assessed: [id number]   • Colon polyp    • Disease of thyroid gland    • GERD (gastroesophageal reflux disease)     Last Assessed: 31Oct2017   • Headache(784 0) Used ri have migraines in the 70’s abd early 80’s   • Hypothyroidism, adult 10/31/2017   • Insomnia    • Osteoporosis     Last Assessed: 31Oct2017   • Reflux esophagitis    • Seasonal allergies    • Urinary incontinence     Last Assessed: 31Oct2017     Past Surgical History:   Procedure Laterality Date   • BREAST SURGERY      biopsy   • CHOLECYSTECTOMY     • COLONOSCOPY     • EXPLORATORY LAPAROTOMY     • GALLBLADDER SURGERY      Last Assessed: [id number]   • INCISIONAL BREAST BIOPSY      Last Assessed: [id number]   • JOINT REPLACEMENT Right    • KNEE SURGERY      Last Assessed: [id number]   • [place] INSERTION/RPLCMT PERIPHERAL/GASTRIC NPGR N/A 03/23/2016    Procedure:  REMOVAL IPG BATTERY ;  Surgeon: Jose Roberto Grier MD;  Location: [facility];  Service: UroGynecology          • REPLACEMENT TOTAL KNEE Right 04/29/2019   • ROOT CANAL  03/11/2019   • SHOULDER SURGERY Left    • SHOULDER SURGERY      Last Assessed: 31Oct2017   • TONSILLECTOMY      last Assessed: 31Oct2017   • US GUIDED INJECTION FOR RESEARCH STUDY  10/03/2014   • WISDOM TOOTH EXTRACTION      Last Assessed: 31Oct2017     Social History   Social History     Substance and Sexual Activity   Alcohol Use Yes    Comment: social drinking only     Social History     Substance and Sexual Activity   Drug Use Never     Social History     Tobacco Use   Smoking Status Never   Smokeless Tobacco Never     Family History   Problem Relation Age of Onset   • Lupus Mother    • Other Mother         Cardiac Disorder   • Heart disease Mother    • Arthritis Mother            • Other Father         Cardiac Disorder   • Diabetes Father         adult diabetes   • Heart disease Father    • Breast cancer Maternal Aunt    • Breast cancer Cousin    • Vision loss Maternal Aunt        Meds/Allergies     (Not in a hospital admission)      Allergies   Allergen Reactions   • Acetazolamide Hives   • Fluticasone GI Intolerance   • Cephalexin Itching   • Sulfa Antibiotics Rash, Edema and Swelling       Objective     Blood pressure 144/69, pulse 72, temperature (!) 97 1 °F (36 2 °C), temperature source Temporal, resp  rate 18, height 5' 2" (1 575 m), weight 83 5 kg (184 lb), SpO2 97 %, not currently breastfeeding      PHYSICAL EXAM:    Gen: NAD  CV: S1 & S2 normal, RRR  CHEST: Clear to auscultate  ABD: soft, NT/ND, good bowel sounds  EXT: no edema    ASSESSMENT:     Constipation, rectal bleeding    PLAN:    Colonoscopy

## 2023-01-20 NOTE — ANESTHESIA PREPROCEDURE EVALUATION
Procedure:  COLONOSCOPY    Relevant Problems   CARDIO   (+) Essential hypertension   (+) Hyperlipidemia   (+) Paroxysmal atrial tachycardia (HCC)      ENDO   (+) Hypothyroidism, adult      GI/HEPATIC   (+) BRBPR (bright red blood per rectum)   (+) Gastroesophageal reflux disease without esophagitis      MUSCULOSKELETAL   (+) Arthritis   (+) Arthritis of knee      PULMONARY   (+) Viral URI (Resolved)      Other   (+) S/P total knee arthroplasty, right        Physical Exam    Airway    Mallampati score: II  TM Distance: >3 FB  Neck ROM: full     Dental       Cardiovascular  Rhythm: regular, Rate: normal,     Pulmonary  Breath sounds clear to auscultation,     Other Findings        Anesthesia Plan  ASA Score- 3     Anesthesia Type- IV sedation with anesthesia with ASA Monitors  Additional Monitors:   Airway Plan:           Plan Factors-    Chart reviewed  Patient is not a current smoker  Induction- intravenous  Postoperative Plan-     Informed Consent- Anesthetic plan and risks discussed with patient  I personally reviewed this patient with the CRNA  Discussed and agreed on the Anesthesia Plan with the CRNA  Darling Harvey

## 2023-01-31 ENCOUNTER — TELEPHONE (OUTPATIENT)
Dept: ADMINISTRATIVE | Facility: OTHER | Age: 73
End: 2023-01-31

## 2023-01-31 NOTE — TELEPHONE ENCOUNTER
----- Message from [facility] sent at 1/31/2023  7:58 AM EST -----  01/31/23 7:58 AM    Hello, our patient Judah Briggs has had Mammogram completed/performed  Please assist in updating the patient chart by pulling the Care Everywhere (CE) document  The date of service is 1/27/2023       Thank you,  [address] [facility]

## 2023-01-31 NOTE — TELEPHONE ENCOUNTER
Upon review of the In Basket request we were able to locate, review, and update the patient chart as requested for Mammogram     Any additional questions or concerns should be emailed to the Practice Liaisons via the appropriate education email address, please do not reply via In Basket      Thank you  Abdoul Pulliam

## 2023-02-01 DIAGNOSIS — D72.10 EOSINOPHILIA, UNSPECIFIED TYPE: Primary | ICD-10-CM

## 2023-02-01 DIAGNOSIS — D72.829 LEUKOCYTOSIS, UNSPECIFIED TYPE: ICD-10-CM

## 2023-02-02 LAB
A PHAGOCYTOPH DNA BLD QL NAA+PROBE: NOT DETECTED
ANA SER QL IF: NEGATIVE
B MICROTI DNA BLD QL NAA+PROBE: NOT DETECTED
BASOPHILS # BLD AUTO: 74 CELLS/UL (ref 0–200)
BASOPHILS NFR BLD AUTO: 0.8 %
BORRELIA MIYAMOTOI DNA QL REAL TIME PCR: NOT DETECTED
BORRELIA SPECIES DNA, QL REAL TIME PCR: NOT DETECTED
BUN SERPL-MCNC: 20 MG/DL (ref 7–25)
BUN/CREAT SERPL: NORMAL (CALC) (ref 6–22)
CALCIUM SERPL-MCNC: 9.1 MG/DL (ref 8.6–10.4)
CHLORIDE SERPL-SCNC: 107 MMOL/L (ref 98–110)
CO2 SERPL-SCNC: 25 MMOL/L (ref 20–32)
COMMENT: NORMAL
CREAT SERPL-MCNC: 0.77 MG/DL (ref 0.6–1)
E CHAFFEENSIS DNA BLD QL NAA+PROBE: NOT DETECTED
EOSINOPHIL # BLD AUTO: 1187 CELLS/UL (ref 15–500)
EOSINOPHIL NFR BLD AUTO: 12.9 %
ERYTHROCYTE [DISTWIDTH] IN BLOOD BY AUTOMATED COUNT: 13.2 % (ref 11–15)
ERYTHROCYTE [SEDIMENTATION RATE] IN BLOOD BY WESTERGREN METHOD: 6 MM/H
GFR/BSA.PRED SERPLBLD CYS-BASED-ARV: 81 ML/MIN/1.73M2
GLUCOSE SERPL-MCNC: 84 MG/DL (ref 65–99)
HCT VFR BLD AUTO: 39.5 % (ref 35–45)
HGB BLD-MCNC: 12.6 G/DL (ref 11.7–15.5)
LYMPHOCYTES # BLD AUTO: 2999 CELLS/UL (ref 850–3900)
LYMPHOCYTES NFR BLD AUTO: 32.6 %
MCH RBC QN AUTO: 26.8 PG (ref 27–33)
MCHC RBC AUTO-ENTMCNC: 31.9 G/DL (ref 32–36)
MCV RBC AUTO: 83.9 FL (ref 80–100)
MONOCYTES # BLD AUTO: 607 CELLS/UL (ref 200–950)
MONOCYTES NFR BLD AUTO: 6.6 %
NEUTROPHILS # BLD AUTO: 4333 CELLS/UL (ref 1500–7800)
NEUTROPHILS NFR BLD AUTO: 47.1 %
PLATELET # BLD AUTO: 285 THOUSAND/UL (ref 140–400)
PMV BLD REES-ECKER: 11.6 FL (ref 7.5–12.5)
POTASSIUM SERPL-SCNC: 4.4 MMOL/L (ref 3.5–5.3)
RBC # BLD AUTO: 4.71 MILLION/UL (ref 3.8–5.1)
SODIUM SERPL-SCNC: 141 MMOL/L (ref 135–146)
TSH SERPL-ACNC: 3.11 MIU/L (ref 0.4–4.5)
URATE SERPL-MCNC: 5 MG/DL (ref 2.5–7)
WBC # BLD AUTO: 9.2 THOUSAND/UL (ref 3.8–10.8)

## 2023-02-03 ENCOUNTER — APPOINTMENT (OUTPATIENT)
Dept: LAB | Facility: CLINIC | Age: 73
End: 2023-02-03

## 2023-02-04 ENCOUNTER — APPOINTMENT (OUTPATIENT)
Dept: LAB | Facility: CLINIC | Age: 73
End: 2023-02-04

## 2023-02-04 DIAGNOSIS — D72.10 EOSINOPHILIA, UNSPECIFIED TYPE: ICD-10-CM

## 2023-02-04 LAB — CORTIS AM PEAK SERPL-MCNC: 19.4 UG/DL (ref 4.2–22.4)

## 2023-02-08 ENCOUNTER — OFFICE VISIT (OUTPATIENT)
Dept: INTERNAL MEDICINE CLINIC | Age: 73
End: 2023-02-08

## 2023-02-08 VITALS
DIASTOLIC BLOOD PRESSURE: 80 MMHG | TEMPERATURE: 98.2 F | SYSTOLIC BLOOD PRESSURE: 148 MMHG | HEIGHT: 62 IN | OXYGEN SATURATION: 98 % | BODY MASS INDEX: 35.04 KG/M2 | HEART RATE: 74 BPM | WEIGHT: 190.4 LBS

## 2023-02-08 DIAGNOSIS — M19.90 ARTHRITIS: ICD-10-CM

## 2023-02-08 DIAGNOSIS — D72.10 EOSINOPHILIA, UNSPECIFIED TYPE: ICD-10-CM

## 2023-02-08 DIAGNOSIS — E03.9 HYPOTHYROIDISM, ADULT: Primary | ICD-10-CM

## 2023-02-08 DIAGNOSIS — I47.1 PAROXYSMAL ATRIAL TACHYCARDIA (HCC): ICD-10-CM

## 2023-02-08 DIAGNOSIS — F41.9 ANXIETY: ICD-10-CM

## 2023-02-08 DIAGNOSIS — I10 ESSENTIAL HYPERTENSION: ICD-10-CM

## 2023-02-08 RX ORDER — LORAZEPAM 0.5 MG/1
0.5 TABLET ORAL
Qty: 30 TABLET | Refills: 0 | Status: SHIPPED | OUTPATIENT
Start: 2023-02-08

## 2023-02-08 NOTE — PROGRESS NOTES
Name: Walter Caruso      : 1950      MRN: [id number]  Encounter Provider: Jean-Pierre Cisneros MD  Encounter Date: 2023   Encounter department: [address] [place] [address]     1  Hypothyroidism, adult  Comments:  TSH normal    2  Anxiety  Comments:  improved on meds  Orders:  -     LORazepam (ATIVAN) 0 5 mg tablet; Take 1 tablet (0 5 mg total) by mouth daily at bedtime    3  Paroxysmal atrial tachycardia (HCC)  Comments:  sees cardiology  Orders:  -     CBC and differential; Future    4  Essential hypertension  -     CBC and differential; Future    5  Arthritis  Comments:  Arthrites of knees  6  Eosinophilia, unspecified type  Comments:  still elivated not critical , elevated CRP (4 3)) to send to rheumatology  Orders:  -     CBC and differential; Future           Subjective      HPI  Review of Systems   Constitutional: Negative for activity change and fatigue  HENT: Negative for ear pain and hearing loss  Cardiovascular: Positive for leg swelling  Genitourinary: Positive for frequency  Musculoskeletal: Positive for arthralgias  Neurological: Negative for dizziness, light-headedness and headaches  Psychiatric/Behavioral: Negative for agitation and confusion  Current Outpatient Medications on File Prior to Visit   Medication Sig   • Acetaminophen (TYLENOL 8 HOUR ARTHRITIS PAIN PO) Take by mouth As needed   • aspirin 81 MG tablet Take 81 mg by mouth daily     • cholecalciferol (VITAMIN D3) 1,000 units tablet Take 1,000 Units by mouth daily    • cyanocobalamin (VITAMIN B-12) 1,000 mcg tablet Take 1,000 mcg by mouth daily   • Diclofenac Sodium (VOLTAREN) 1 % APPLY 4 G TOPICALLY 2 (TWO) TIMES A DAY AS NEEDED (KNEE PAIN)   • Elderberry 500 MG CAPS 2 capsules in the morning   • esomeprazole (NexIUM) 20 mg capsule Take 20 mg by mouth 2 (two) times a day    • ezetimibe (ZETIA) 10 mg tablet TAKE 1 TABLET BY MOUTH DAILY   • famotidine (PEPCID) 20 mg tablet Take 1 tablet (20 mg total) by mouth 2 (two) times a day   • fluticasone (FLONASE) 50 mcg/act nasal spray 1 spray into each nostril daily   • hydrocortisone (ANUSOL-HC) 25 mg suppository Insert 1 suppository (25 mg total) into the rectum 2 (two) times a day as needed for hemorrhoids for up to 6 days   • levothyroxine 25 mcg tablet Take 1 tablet (25 mcg total) by mouth daily   • meloxicam (MOBIC) 7 5 mg tablet Take 7 5 mg by mouth daily   • NON FORMULARY CBD cream, applies to B/L knees   • nystatin (MYCOSTATIN) cream Apply topically 2 (two) times a day   • nystatin (MYCOSTATIN) powder Apply topically 2 (two) times a day   • Olopatadine HCl (PATADAY OP) Apply to eye if needed   • rosuvastatin (CRESTOR) 10 MG tablet Take 1 tablet (10 mg total) by mouth 3 (three) times a week Use three times a week   • terbinafine (LamISIL) 1 % cream Apply topically 2 (two) times a day   • triamcinolone (KENALOG) 0 1 % cream Apply topically 2 (two) times a day   • Zinc 100 MG TABS prn   • [DISCONTINUED] LORazepam (ATIVAN) 0 5 mg tablet Take 1 tablet (0 5 mg total) by mouth daily at bedtime (Patient taking differently: Take 0 5 mg by mouth daily at bedtime as needed)       Objective     /80 (BP Location: Left arm, Patient Position: Sitting, Cuff Size: Large)   Pulse 74   Temp 98 2 °F (36 8 °C) (Temporal)   Ht 5' 2" (1 575 m)   Wt 86 4 kg (190 lb 6 4 oz)   SpO2 98%   BMI 34 82 kg/m²     Physical Exam  Nestor Edwards MD

## 2023-02-15 LAB
BASOPHILS # BLD AUTO: 80 CELLS/UL (ref 0–200)
BASOPHILS NFR BLD AUTO: 0.8 %
EOSINOPHIL # BLD AUTO: 740 CELLS/UL (ref 15–500)
EOSINOPHIL NFR BLD AUTO: 7.4 %
ERYTHROCYTE [DISTWIDTH] IN BLOOD BY AUTOMATED COUNT: 13.2 % (ref 11–15)
HCT VFR BLD AUTO: 40.6 % (ref 35–45)
HGB BLD-MCNC: 13.4 G/DL (ref 11.7–15.5)
LYMPHOCYTES # BLD AUTO: 3400 CELLS/UL (ref 850–3900)
LYMPHOCYTES NFR BLD AUTO: 34 %
MCH RBC QN AUTO: 27.2 PG (ref 27–33)
MCHC RBC AUTO-ENTMCNC: 33 G/DL (ref 32–36)
MCV RBC AUTO: 82.4 FL (ref 80–100)
MONOCYTES # BLD AUTO: 620 CELLS/UL (ref 200–950)
MONOCYTES NFR BLD AUTO: 6.2 %
NEUTROPHILS # BLD AUTO: 5160 CELLS/UL (ref 1500–7800)
NEUTROPHILS NFR BLD AUTO: 51.6 %
PLATELET # BLD AUTO: 285 THOUSAND/UL (ref 140–400)
PMV BLD REES-ECKER: 11.4 FL (ref 7.5–12.5)
RBC # BLD AUTO: 4.93 MILLION/UL (ref 3.8–5.1)
WBC # BLD AUTO: 10 THOUSAND/UL (ref 3.8–10.8)

## 2023-02-28 NOTE — PROGRESS NOTES
Consultation - [facility]) [facility]  Renetta Vaca 1950 female         Chief Complaint:  GERD, vomiting    HPI:  65-year-old female with history of GERD and hiatal hernia reports having issues with gagging and vomiting at night sometimes  She takes Nexium regularly  She had upper endoscopy and colonoscopy 3 years ago and was advised to come back for repeat colonoscopy in 5 years  She gives history of small colon polyps in the past   Regular bowel movements and denies any blood or mucus in the stool  Denies any difficulty swallowing  No abdominal pain  She reports taking NSAIDs for arthritis    REVIEW OF SYSTEMS: Review of Systems   Constitutional: Negative for activity change, appetite change, chills, diaphoresis, fatigue, fever and unexpected weight change  HENT: Negative for ear discharge, ear pain, facial swelling, hearing loss, nosebleeds, sore throat, tinnitus and voice change  Eyes: Negative for pain, discharge, redness, itching and visual disturbance  Respiratory: Negative for apnea, cough, chest tightness, shortness of breath and wheezing  Cardiovascular: Negative for chest pain and palpitations  Gastrointestinal:        As noted in HPI   Endocrine: Negative for cold intolerance, heat intolerance and polyuria  Genitourinary: Negative for difficulty urinating, dysuria, flank pain, hematuria and urgency  Musculoskeletal: Positive for arthralgias  Negative for back pain, gait problem, joint swelling and myalgias  Skin: Negative for rash and wound  Neurological: Negative for dizziness, tremors, seizures, speech difficulty, light-headedness, numbness and headaches  Hematological: Negative for adenopathy  Does not bruise/bleed easily  Psychiatric/Behavioral: Negative for agitation, behavioral problems and confusion  The patient is not nervous/anxious           Past Medical History:   Diagnosis Date    Anxiety     Last Assessed: [id number]    Arthritis     Last Assessed: [id number]    GERD (gastroesophageal reflux disease)     Last Assessed: 31Oct2017    Hypothyroidism, adult 10/31/2017    Osteoporosis     Last Assessed: [id number]    Reflux esophagitis     Urinary incontinence     Last Assessed: 31Oct2017      Past Surgical History:   Procedure Laterality Date    CHOLECYSTECTOMY      GALLBLADDER SURGERY      Last Assessed: [id number]    INCISIONAL BREAST BIOPSY      Last Assessed: [id number]   [facility] KNEE SURGERY      Last Assessed: 31Oct2017    RI IMPLANT PERIPH/GASTRIC NEUROSTIM/ N/A 3/23/2016    Procedure:  REMOVAL IPG BATTERY ;  Surgeon: Deonna Samuels MD;  Location: [facility];  Service: UroGynecology           REPLACEMENT TOTAL KNEE Right 04/29/2019    ROOT CANAL  03/11/2019    SHOULDER SURGERY Left     SHOULDER SURGERY      Last Assessed: [id number]    TONSILLECTOMY      last Assessed: 31Oct2017    WISDOM TOOTH EXTRACTION      Last Assessed: [id number]     Social History     Socioeconomic History    Marital status:      Spouse name: Not on file    Number of children: Not on file    Years of education: Not on file    Highest education level: Not on file   Occupational History    Not on file   Social Needs    Financial resource strain: Not on file    Food insecurity:     Worry: Not on file     Inability: Not on file    Transportation needs:     Medical: Not on file     Non-medical: Not on file   Tobacco Use    Smoking status: Never Smoker    Smokeless tobacco: Never Used   Substance and Sexual Activity    Alcohol use: Yes     Frequency: 2-4 times a month     Drinks per session: 1 or 2     Binge frequency: Never     Comment: Social - wine    Drug use: No    Sexual activity: Not on file   Lifestyle    Physical activity:     Days per week: Not on file     Minutes per session: Not on file    Stress: Not on file   Relationships    Social connections:     Talks on phone: Not on file     Gets together: Not on file     Attends [employer] service: Not on file     Active member of club or organization: Not on file     Attends meetings of clubs or organizations: Not on file     Relationship status: Not on file    Intimate partner violence:     Fear of current or ex partner: Not on file     Emotionally abused: Not on file     Physically abused: Not on file     Forced sexual activity: Not on file   Other Topics Concern    Not on file   Social History Narrative    Not on file     Family History   Problem Relation Age of Onset    Lupus Mother     Other Mother         Cardiac Disorder    Other Father         Cardiac Disorder    Diabetes Father     Breast cancer Maternal Aunt     Breast cancer Cousin      Sulfa antibiotics  Current Outpatient Medications   Medication Sig Dispense Refill    aspirin 81 MG tablet Take 81 mg by mouth daily        cholecalciferol (VITAMIN D3) 1,000 units tablet Take 1,000 Units by mouth daily       cyanocobalamin (VITAMIN B-12) 1,000 mcg tablet Take 1,000 mcg by mouth daily      diclofenac sodium (VOLTAREN) 1 % Apply 4 g topically 2 (two) times a day as needed (knee pain) 1 Tube 1    esomeprazole (NexIUM) 20 mg capsule Take 40 mg by mouth daily at bedtime        fexofenadine (ALLEGRA) 180 MG tablet Take 1 tablet by mouth as needed       fluticasone (FLONASE) 50 mcg/act nasal spray 1 spray into each nostril daily 1 Bottle 1    ibuprofen (MOTRIN) 800 mg tablet TAKE 1 TABLET BY MOUTH THREE TIMES A DAY WITH FOOD AS NEEDED  1    LORazepam (ATIVAN) 0 5 mg tablet Take 1 tablet (0 5 mg total) by mouth daily as needed for anxiety 30 tablet 0    multivitamin (THERAGRAN) TABS Take 1 tablet by mouth daily      naproxen sodium (ALEVE) 220 MG tablet Take 220 mg by mouth daily as needed        nystatin (MYCOSTATIN) cream APPLY TO THE AFFECTED AREA(S) BY TOPICAL ROUTE 2 TIMES PER DAY FOR 2 WEEKS  2    nystatin (MYCOSTATIN) powder Apply topically 3 (three) times a day 56 7 g 1    DULoxetine (CYMBALTA) 30 mg delayed release capsule TAKE 1 CAPSULE BY MOUTH EVERY DAY (Patient not taking: Reported on 7/2/2019) 30 capsule 0     No current facility-administered medications for this visit  Blood pressure 116/62, pulse 63, temperature 98 2 °F (36 8 °C), temperature source Tympanic, resp  rate 18, height 5' 2" (1 575 m), weight 79 8 kg (176 lb)  PHYSICAL EXAM: Physical Exam   Constitutional: She is oriented to person, place, and time  She appears well-developed and well-nourished  HENT:   Head: Normocephalic and atraumatic  Nose: Nose normal    Mouth/Throat: Oropharynx is clear and moist    Eyes: Conjunctivae are normal  Right eye exhibits no discharge  Left eye exhibits no discharge  No scleral icterus  Neck: Neck supple  No JVD present  No tracheal deviation present  No thyromegaly present  Cardiovascular: Normal rate, regular rhythm and normal heart sounds  Exam reveals no gallop and no friction rub  No murmur heard  Pulmonary/Chest: Effort normal and breath sounds normal  No respiratory distress  She has no wheezes  She has no rales  She exhibits no tenderness  Abdominal: Soft  Bowel sounds are normal  She exhibits no distension and no mass  There is no tenderness  There is no rebound and no guarding  No hernia  Musculoskeletal: She exhibits no edema, tenderness or deformity  Lymphadenopathy:     She has no cervical adenopathy  Neurological: She is alert and oriented to person, place, and time  Skin: Skin is warm and dry  No rash noted  No erythema  Psychiatric: She has a normal mood and affect   Her behavior is normal  Thought content normal         Lab Results   Component Value Date    WBC 9 4 08/05/2019    HGB 12 2 08/05/2019    HCT 38 7 08/05/2019    MCV 82 7 08/05/2019     08/05/2019     Lab Results   Component Value Date    CALCIUM 8 9 05/23/2019    K 3 5 05/23/2019    CO2 23 05/23/2019     05/23/2019    BUN 18 05/23/2019    CREATININE 0 70 05/23/2019     Lab Results   Component Value Date ALT 15 08/05/2019    AST 11 08/05/2019    ALKPHOS 115 08/05/2019     No results found for: INR, PROTIME    Xr Chest Pa & Lateral    Result Date: 7/20/2019  Impression: No acute cardiopulmonary disease  Workstation performed: [id number]     Us Abdomen Complete    Result Date: 7/21/2019  Impression: Hepatic steatosis  Bilateral renal cysts  Status post post cholecystectomy  Workstation performed: [id number]       ASSESSMENT & PLAN:    Gastroesophageal reflux disease without esophagitis  Symptoms of gagging at night appear to most likely from acid reflux  Also consider due to postnasal drip     -Patient was explained about the lifestyle and dietary modifications  Advised to avoid fatty foods, chocolates, caffeine, alcohol and any other triggering foods  Avoid eating for at least 3 hours before going to bed         -continue Nexium  Discussed about long-term side effects from Nexium and we can try high-dose Pepcid twice daily after the EGD    -schedule for EGD    -Patient was explained about  the risks and benefits of the procedure  Risks including but not limited to bleeding, infection, perforation were explained in detail  Also explained about less than 100% sensitivity with the exam and other alternatives  room air

## 2023-03-01 LAB — HCV AB SER-ACNC: NORMAL

## 2023-03-02 ENCOUNTER — HOSPITAL ENCOUNTER (OUTPATIENT)
Dept: RADIOLOGY | Facility: HOSPITAL | Age: 73
Discharge: HOME | End: 2023-03-02

## 2023-03-02 DIAGNOSIS — D72.10 EOSINOPHILIA, UNSPECIFIED TYPE: ICD-10-CM

## 2023-03-15 ENCOUNTER — OFFICE VISIT (OUTPATIENT)
Dept: INTERNAL MEDICINE CLINIC | Age: 73
End: 2023-03-15

## 2023-03-15 ENCOUNTER — APPOINTMENT (OUTPATIENT)
Dept: LAB | Facility: CLINIC | Age: 73
End: 2023-03-15

## 2023-03-15 ENCOUNTER — HOSPITAL ENCOUNTER (OUTPATIENT)
Dept: RADIOLOGY | Facility: HOSPITAL | Age: 73
Discharge: HOME | End: 2023-03-15

## 2023-03-15 VITALS
DIASTOLIC BLOOD PRESSURE: 78 MMHG | HEART RATE: 72 BPM | OXYGEN SATURATION: 98 % | HEIGHT: 62 IN | WEIGHT: 190 LBS | SYSTOLIC BLOOD PRESSURE: 110 MMHG | BODY MASS INDEX: 34.96 KG/M2 | TEMPERATURE: 97.2 F

## 2023-03-15 DIAGNOSIS — F41.9 ANXIETY: ICD-10-CM

## 2023-03-15 DIAGNOSIS — E66.01 OBESITY, MORBID (HCC): ICD-10-CM

## 2023-03-15 DIAGNOSIS — D72.119 HYPEREOSINOPHILIC SYNDROME, UNSPECIFIED TYPE: ICD-10-CM
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
## 2023-03-15 LAB
BASOPHILS # BLD AUTO: 0.09 THOUSANDS/ÂΜL (ref 0–0.1)
BASOPHILS NFR BLD AUTO: 1 % (ref 0–1)
EOSINOPHIL # BLD AUTO: 0.59 THOUSAND/ÂΜL (ref 0–0.61)
EOSINOPHIL NFR BLD AUTO: 6 % (ref 0–6)
ERYTHROCYTE [DISTWIDTH] IN BLOOD BY AUTOMATED COUNT: 13.4 % (ref 11.6–15.1)
HCT VFR BLD AUTO: 43 % (ref 34.8–46.1)
HGB BLD-MCNC: 13.6 G/DL (ref 11.5–15.4)
IMM GRANULOCYTES # BLD AUTO: 0.06 THOUSAND/UL (ref 0–0.2)
IMM GRANULOCYTES NFR BLD AUTO: 1 % (ref 0–2)
LYMPHOCYTES # BLD AUTO: 3.3 THOUSANDS/ÂΜL (ref 0.6–4.47)
LYMPHOCYTES NFR BLD AUTO: 31 % (ref 14–44)
MCH RBC QN AUTO: 27.2 PG (ref 26.8–34.3)
MCHC RBC AUTO-ENTMCNC: 31.6 G/DL (ref 31.4–37.4)
MCV RBC AUTO: 86 FL (ref 82–98)
MONOCYTES # BLD AUTO: 0.73 THOUSAND/ÂΜL (ref 0.17–1.22)
MONOCYTES NFR BLD AUTO: 7 % (ref 4–12)
NEUTROPHILS # BLD AUTO: 5.75 THOUSANDS/ÂΜL (ref 1.85–7.62)
NEUTS SEG NFR BLD AUTO: 54 % (ref 43–75)
NRBC BLD AUTO-RTO: 0 /100 WBCS
PLATELET # BLD AUTO: 273 THOUSANDS/UL (ref 149–390)
PMV BLD AUTO: 11.3 FL (ref 8.9–12.7)
RBC # BLD AUTO: 5 MILLION/UL (ref 3.81–5.12)
WBC # BLD AUTO: 10.52 THOUSAND/UL (ref 4.31–10.16)

## 2023-03-15 RX ORDER — LORAZEPAM 0.5 MG/1
0.5 TABLET ORAL
Qty: 30 TABLET | Refills: 0 | Status: SHIPPED | OUTPATIENT
Start: 2023-03-15

## 2023-03-15 NOTE — PATIENT INSTRUCTIONS
Repeat CBC  Return to office 6 weeks  Get xray of  rt   Knee and make an appointment with your orthopedic specialist  Follow up with Dr Veronica Valle

## 2023-03-15 NOTE — PROGRESS NOTES
Name: Alvaro Johnson      : 1950      MRN: [id number]  Encounter Provider: Sofi Stephen MD  Encounter Date: 3/15/2023   [facility] [address] [place] [place] [address]  Anxiety  Comments:  improved on meds           Subjective      Case of a 67 Year old white female whole  recently fell  Injured he rt knee   She will  call her orthopedic specialist to see her  today  She was  not lightheaded or dizzy  Did not faint  She has a recent history of Eosinophelia, she saw a rheumotologist, awaiting new lab tests  Review of Systems   Constitutional: Negative for chills, diaphoresis, fatigue and fever  HENT: Negative for congestion, hearing loss, mouth sores and postnasal drip  Eyes: Negative for discharge  Respiratory: Positive for cough and shortness of breath (at time of fall)  Gastrointestinal: Negative for abdominal pain, anal bleeding, blood in stool, constipation, diarrhea and nausea  Genitourinary: Negative for difficulty urinating, frequency, pelvic pain and urgency  Musculoskeletal: Positive for arthralgias (knee pain b/l)  Neurological: Negative for dizziness, seizures, syncope, facial asymmetry, speech difficulty, light-headedness, numbness and headaches  Psychiatric/Behavioral: Negative for agitation, confusion, decreased concentration, dysphoric mood and hallucinations  Current Outpatient Medications on File Prior to Visit   Medication Sig   • Acetaminophen (TYLENOL 8 HOUR ARTHRITIS PAIN PO) Take by mouth As needed   • aspirin 81 MG tablet Take 81 mg by mouth daily     • cholecalciferol (VITAMIN D3) 1,000 units tablet Take 1,000 Units by mouth daily    • cyanocobalamin (VITAMIN B-12) 1,000 mcg tablet Take 1,000 mcg by mouth daily   • Diclofenac Sodium (VOLTAREN) 1 % APPLY 4 G TOPICALLY 2 (TWO) TIMES A DAY AS NEEDED (KNEE PAIN)   • Elderberry 500 MG CAPS 2 capsules in the morning   • esomeprazole (NexIUM) 20 mg capsule Take 20 mg by mouth 2 (two) times a day    • ezetimibe (ZETIA) 10 mg tablet TAKE 1 TABLET BY MOUTH DAILY   • famotidine (PEPCID) 20 mg tablet Take 1 tablet (20 mg total) by mouth 2 (two) times a day   • fluticasone (FLONASE) 50 mcg/act nasal spray 1 spray into each nostril daily   • hydrocortisone (ANUSOL-HC) 25 mg suppository Insert 1 suppository (25 mg total) into the rectum 2 (two) times a day as needed for hemorrhoids for up to 6 days   • levothyroxine 25 mcg tablet Take 1 tablet (25 mcg total) by mouth daily   • LORazepam (ATIVAN) 0 5 mg tablet Take 1 tablet (0 5 mg total) by mouth daily at bedtime   • meloxicam (MOBIC) 7 5 mg tablet Take 7 5 mg by mouth daily   • NON FORMULARY CBD cream, applies to B/L knees   • nystatin (MYCOSTATIN) cream Apply topically 2 (two) times a day   • nystatin (MYCOSTATIN) powder Apply topically 2 (two) times a day   • Olopatadine HCl (PATADAY OP) Apply to eye if needed   • rosuvastatin (CRESTOR) 10 MG tablet Take 1 tablet (10 mg total) by mouth 3 (three) times a week Use three times a week   • terbinafine (LamISIL) 1 % cream Apply topically 2 (two) times a day   • triamcinolone (KENALOG) 0 1 % cream Apply topically 2 (two) times a day   • Zinc 100 MG TABS prn       Objective     /78 (BP Location: Left arm, Patient Position: Sitting, Cuff Size: Standard)   Pulse 72   Temp (!) 97 2 °F (36 2 °C) (Temporal)   Ht 5' 2" (1 575 m)   Wt 86 2 kg (190 lb)   SpO2 98%   BMI 34 75 kg/m²     Physical Exam  Vitals and nursing note reviewed  Constitutional:       Appearance: Normal appearance  HENT:      Head: Normocephalic and atraumatic  Right Ear: There is no impacted cerumen  Left Ear: There is no impacted cerumen  Nose: No congestion or rhinorrhea  Mouth/Throat:      Mouth: Mucous membranes are dry  Cardiovascular:      Rate and Rhythm: Normal rate and regular rhythm  Heart sounds: No murmur heard  No gallop     Pulmonary:      Effort: No respiratory distress  Breath sounds: No stridor  No wheezing or rales  Abdominal:      General: Abdomen is flat  There is no distension  Palpations: Abdomen is soft  There is no mass  Musculoskeletal:         General: Tenderness present  No swelling  Normal range of motion  Skin:     Coloration: Skin is not jaundiced or pale  Neurological:      Mental Status: She is alert  Psychiatric:         Mood and Affect: Mood normal          Behavior: Behavior normal          Thought Content:  Thought content normal          Judgment: Judgment normal        Nestor Edwards MD

## 2023-04-12 DIAGNOSIS — M35.9 AUTOIMMUNE DISEASE (HCC): ICD-10-CM

## 2023-04-12 DIAGNOSIS — D72.10 EOSINOPHILIA, UNSPECIFIED TYPE: Primary | ICD-10-CM

## 2023-05-03 ENCOUNTER — PROCEDURE VISIT (OUTPATIENT)
Age: 73
End: 2023-05-03

## 2023-05-03 VITALS
SYSTOLIC BLOOD PRESSURE: 160 MMHG | HEIGHT: 62 IN | BODY MASS INDEX: 34.96 KG/M2 | HEART RATE: 77 BPM | DIASTOLIC BLOOD PRESSURE: 87 MMHG | WEIGHT: 190 LBS

## 2023-05-03 DIAGNOSIS — G89.29 CHRONIC BILATERAL THORACIC BACK PAIN: ICD-10-CM

## 2023-05-03 DIAGNOSIS — M99.01 SEGMENTAL DYSFUNCTION OF CERVICAL REGION: ICD-10-CM

## 2023-05-03 DIAGNOSIS — M54.41 CHRONIC RIGHT-SIDED LOW BACK PAIN WITH RIGHT-SIDED SCIATICA: ICD-10-CM

## 2023-05-03 DIAGNOSIS — G89.29 CHRONIC RIGHT-SIDED LOW BACK PAIN WITH RIGHT-SIDED SCIATICA: ICD-10-CM

## 2023-05-03 DIAGNOSIS — M99.04 SEGMENTAL DYSFUNCTION OF SACRAL REGION: ICD-10-CM

## 2023-05-03 DIAGNOSIS — M99.02 SEGMENTAL DYSFUNCTION OF THORACIC REGION: ICD-10-CM

## 2023-05-03 DIAGNOSIS — M54.2 NECK PAIN: ICD-10-CM

## 2023-05-03 DIAGNOSIS — M54.16 LUMBAR RADICULOPATHY: ICD-10-CM

## 2023-05-03 DIAGNOSIS — M79.18 MYOFASCIAL PAIN: ICD-10-CM

## 2023-05-03 DIAGNOSIS — M54.6 CHRONIC BILATERAL THORACIC BACK PAIN: ICD-10-CM

## 2023-05-03 DIAGNOSIS — M99.05 SEGMENTAL DYSFUNCTION OF PELVIC REGION: Primary | ICD-10-CM

## 2023-05-03 DIAGNOSIS — M99.03 SEGMENTAL DYSFUNCTION OF LUMBAR REGION: ICD-10-CM

## 2023-05-08 NOTE — PROGRESS NOTES
Date of first visit: 5/3/2023      HPI:  Kylee Espinal presents for treatment today describes neck pain upper back pain tightness into the right shoulder blade    Relates right-sided low back pain into the right hip he has been having ongoing issues with her knees  The following portions of the patient's history were reviewed and updated as appropriate: allergies, current medications, past family history, past medical history, past social history, past surgical history, and problem list     Review of Systems    Physical Exam:  Pelvic obliquity elevated right versus left innominate leg inequality at this point function right parasacral region biomechanically joint dysfunction present in the right sacroiliac joint L5-S1 motion unit  Partial involvement cervical thoracic region active triggers in the right trapezius levator scapula and rhomboid  Joint dysfunction C5-C6    Assessment:   Diagnosis ICD-10-CM Associated Orders   1  Segmental dysfunction of pelvic region  M99 05       2  Lumbar radiculopathy  M54 16       3  Segmental dysfunction of sacral region  M99 04       4  Segmental dysfunction of lumbar region  M99 03       5  Chronic right-sided low back pain with right-sided sciatica  M54 41     G89 29       6  Chronic bilateral thoracic back pain  M54 6     G89 29       7  Segmental dysfunction of thoracic region  M99 02       8  Segmental dysfunction of cervical region  M99 01       9  Neck pain  M54 2       10  Myofascial pain  M79 18                 Treatment: 82765  Manipulation right innominate, sacrum, L5 L4 well-tolerated  Manipulation C5-C6 well-tolerated      Discussion:  Reviewed home stretching with Kylee Espinal she will continue this I will see her back 2-week follow-up visit

## 2023-05-25 ENCOUNTER — PROCEDURE VISIT (OUTPATIENT)
Age: 73
End: 2023-05-25

## 2023-05-25 VITALS
HEIGHT: 62 IN | WEIGHT: 190 LBS | SYSTOLIC BLOOD PRESSURE: 150 MMHG | BODY MASS INDEX: 34.96 KG/M2 | DIASTOLIC BLOOD PRESSURE: 83 MMHG | HEART RATE: 73 BPM

## 2023-05-25 DIAGNOSIS — M99.04 SEGMENTAL DYSFUNCTION OF SACRAL REGION: ICD-10-CM

## 2023-05-25 DIAGNOSIS — G89.29 CHRONIC RIGHT-SIDED LOW BACK PAIN WITH RIGHT-SIDED SCIATICA: ICD-10-CM

## 2023-05-25 DIAGNOSIS — M54.41 CHRONIC RIGHT-SIDED LOW BACK PAIN WITH RIGHT-SIDED SCIATICA: ICD-10-CM

## 2023-05-25 DIAGNOSIS — M54.16 LUMBAR RADICULOPATHY: ICD-10-CM

## 2023-05-25 DIAGNOSIS — M99.03 SEGMENTAL DYSFUNCTION OF LUMBAR REGION: ICD-10-CM

## 2023-05-25 DIAGNOSIS — M54.2 NECK PAIN: ICD-10-CM

## 2023-05-25 DIAGNOSIS — M99.05 SEGMENTAL DYSFUNCTION OF PELVIC REGION: Primary | ICD-10-CM

## 2023-05-25 DIAGNOSIS — M99.02 SEGMENTAL DYSFUNCTION OF THORACIC REGION: ICD-10-CM

## 2023-05-25 DIAGNOSIS — M79.18 MYOFASCIAL PAIN: ICD-10-CM

## 2023-05-25 DIAGNOSIS — G89.29 CHRONIC BILATERAL THORACIC BACK PAIN: ICD-10-CM

## 2023-05-25 DIAGNOSIS — M54.6 CHRONIC BILATERAL THORACIC BACK PAIN: ICD-10-CM

## 2023-05-25 DIAGNOSIS — M99.01 SEGMENTAL DYSFUNCTION OF CERVICAL REGION: ICD-10-CM

## 2023-05-25 NOTE — PROGRESS NOTES
Date of first visit: 5/3/2023      HPI:  Maryellen Harkins presents for treatment today describes neck pain upper back pain tightness into the right shoulder blade    Relates right-sided low back pain into the right hip he has been having ongoing issues with her knees  The following portions of the patient's history were reviewed and updated as appropriate: allergies, current medications, past family history, past medical history, past social history, past surgical history, and problem list     Review of Systems    Physical Exam:  Pelvic obliquity elevated right versus left innominate leg inequality at this point function right parasacral region biomechanically joint dysfunction present in the right sacroiliac joint L5-S1 motion unit  Partial involvement cervical thoracic region active triggers in the right trapezius levator scapula and rhomboid  Joint dysfunction C5-C6    Assessment:   Diagnosis ICD-10-CM Associated Orders   1  Segmental dysfunction of pelvic region  M99 05       2  Lumbar radiculopathy  M54 16       3  Segmental dysfunction of sacral region  M99 04       4  Segmental dysfunction of lumbar region  M99 03       5  Chronic right-sided low back pain with right-sided sciatica  M54 41     G89 29       6  Chronic bilateral thoracic back pain  M54 6     G89 29       7  Segmental dysfunction of thoracic region  M99 02       8  Segmental dysfunction of cervical region  M99 01       9  Neck pain  M54 2       10  Myofascial pain  M79 18                 Treatment: 79638  Manipulation right innominate, sacrum, L5 L4 well-tolerated  Manipulation C5-C6 well-tolerated      Discussion:  Reviewed home stretching with Maryellen Harkins she will continue this I will see her back 3-week follow-up visit

## 2023-06-14 ENCOUNTER — OFFICE VISIT (OUTPATIENT)
Dept: INTERNAL MEDICINE CLINIC | Age: 73
End: 2023-06-14
Payer: MEDICARE

## 2023-06-14 ENCOUNTER — TELEPHONE (OUTPATIENT)
Dept: GASTROENTEROLOGY | Facility: CLINIC | Age: 73
End: 2023-06-14

## 2023-06-14 VITALS
OXYGEN SATURATION: 96 % | BODY MASS INDEX: 35.33 KG/M2 | DIASTOLIC BLOOD PRESSURE: 72 MMHG | SYSTOLIC BLOOD PRESSURE: 130 MMHG | TEMPERATURE: 98.7 F | HEIGHT: 62 IN | WEIGHT: 192 LBS | HEART RATE: 72 BPM

## 2023-06-14 DIAGNOSIS — B49 FUNGAL INFECTION: ICD-10-CM

## 2023-06-14 DIAGNOSIS — R05.3 CHRONIC COUGH: ICD-10-CM

## 2023-06-14 DIAGNOSIS — I10 ESSENTIAL HYPERTENSION: ICD-10-CM

## 2023-06-14 DIAGNOSIS — E78.2 MODERATE MIXED HYPERLIPIDEMIA NOT REQUIRING STATIN THERAPY: ICD-10-CM

## 2023-06-14 DIAGNOSIS — R11.2 NAUSEA AND VOMITING, UNSPECIFIED VOMITING TYPE: ICD-10-CM

## 2023-06-14 DIAGNOSIS — R13.13 PHARYNGEAL DYSPHAGIA: Primary | ICD-10-CM

## 2023-06-14 PROCEDURE — 99214 OFFICE O/P EST MOD 30 MIN: CPT | Performed by: INTERNAL MEDICINE

## 2023-06-14 RX ORDER — NYSTATIN 100000 [USP'U]/G
POWDER TOPICAL 2 TIMES DAILY
Qty: 60 G | Refills: 2 | Status: SHIPPED | OUTPATIENT
Start: 2023-06-14

## 2023-06-14 NOTE — PROGRESS NOTES
Name: Naa Rodriguez      : 1950      MRN: [id number]  Encounter Provider: Mariaelena Abdi MD  Encounter Date: 2023   Encounter department: [address] [place] [address] [place]     1  Fungal infection           Subjective       75year old present with a  dry cough  She has noted post  Prandial  Vomiting  She has noted that the cough is worse since covid( 1 year ago)  She also notes fatigue  She has a dry cough, post nasal drip      GI Problem  The primary symptoms include nausea  Primary symptoms do not include fever, fatigue, abdominal pain or diarrhea  The illness does not include chills or constipation  Review of Systems   Constitutional: Negative for chills, diaphoresis, fatigue, fever and unexpected weight change  HENT: Positive for postnasal drip, rhinorrhea, sinus pressure, sinus pain and sneezing  Negative for congestion, hearing loss, nosebleeds, sore throat and tinnitus  Eyes: Negative  Respiratory: Positive for choking  Negative for apnea, cough, chest tightness, shortness of breath, wheezing and stridor  Cardiovascular: Positive for chest pain (on coughing) and leg swelling  Negative for palpitations  Gastrointestinal: Positive for nausea  Negative for abdominal distention, abdominal pain, anal bleeding, blood in stool, constipation and diarrhea  Genitourinary: Positive for frequency and urgency  Negative for difficulty urinating  Neurological: Negative for dizziness, facial asymmetry, light-headedness, numbness and headaches  Hematological: Does not bruise/bleed easily  Current Outpatient Medications on File Prior to Visit   Medication Sig   • Acetaminophen (TYLENOL 8 HOUR ARTHRITIS PAIN PO) Take by mouth As needed   • aspirin 81 MG tablet Take 81 mg by mouth daily     • cholecalciferol (VITAMIN D3) 1,000 units tablet Take 1,000 Units by mouth daily    • cyanocobalamin (VITAMIN B-12) 1,000 mcg tablet Take 1,000 mcg by mouth "daily   • Diclofenac Sodium (VOLTAREN) 1 % APPLY 4 G TOPICALLY 2 (TWO) TIMES A DAY AS NEEDED (KNEE PAIN)   • Elderberry 500 MG CAPS 2 capsules in the morning   • esomeprazole (NexIUM) 20 mg capsule Take 20 mg by mouth 2 (two) times a day    • ezetimibe (ZETIA) 10 mg tablet TAKE 1 TABLET BY MOUTH DAILY   • fluticasone (FLONASE) 50 mcg/act nasal spray 1 spray into each nostril daily   • hydrocortisone (ANUSOL-HC) 25 mg suppository Insert 1 suppository (25 mg total) into the rectum 2 (two) times a day as needed for hemorrhoids for up to 6 days   • levothyroxine 25 mcg tablet Take 1 tablet (25 mcg total) by mouth daily   • LORazepam (ATIVAN) 0 5 mg tablet Take 1 tablet (0 5 mg total) by mouth daily at bedtime   • meloxicam (MOBIC) 7 5 mg tablet Take 7 5 mg by mouth daily   • NON FORMULARY CBD cream, applies to B/L knees   • nystatin (MYCOSTATIN) cream Apply topically 2 (two) times a day   • nystatin (MYCOSTATIN) powder Apply topically 2 (two) times a day   • Olopatadine HCl (PATADAY OP) Apply to eye if needed   • rosuvastatin (CRESTOR) 10 MG tablet Take 1 tablet (10 mg total) by mouth 3 (three) times a week Use three times a week   • terbinafine (LamISIL) 1 % cream Apply topically 2 (two) times a day   • triamcinolone (KENALOG) 0 1 % cream Apply topically 2 (two) times a day   • Zinc 100 MG TABS prn   • [DISCONTINUED] famotidine (PEPCID) 20 mg tablet Take 1 tablet (20 mg total) by mouth 2 (two) times a day (Patient not taking: Reported on 6/14/2023)       Objective     /72 (BP Location: Left arm, Patient Position: Sitting, Cuff Size: Standard)   Pulse 72   Temp 98 7 °F (37 1 °C) (Temporal)   Ht 5' 2\" (1 575 m)   Wt 87 1 kg (192 lb)   SpO2 96%   BMI 35 12 kg/m²     Physical Exam  Vitals and nursing note reviewed  Constitutional:       Appearance: Normal appearance  HENT:      Head: Normocephalic and atraumatic  Right Ear: External ear normal  There is no impacted cerumen        Left Ear: External ear " normal  There is no impacted cerumen  Nose: Congestion present  No rhinorrhea  Mouth/Throat:      Mouth: Mucous membranes are dry  Eyes:      Extraocular Movements: Extraocular movements intact  Pupils: Pupils are equal, round, and reactive to light  Cardiovascular:      Rate and Rhythm: Normal rate and regular rhythm  Heart sounds: No murmur heard  No friction rub  No gallop  Pulmonary:      Effort: No respiratory distress  Breath sounds: No rhonchi  Abdominal:      General: Abdomen is flat  Bowel sounds are normal       Palpations: Abdomen is soft  Musculoskeletal:         General: No swelling or tenderness  Cervical back: Neck supple  No tenderness  Skin:     Coloration: Skin is not jaundiced or pale  Findings: No bruising or erythema  Neurological:      General: No focal deficit present  Mental Status: She is alert and oriented to person, place, and time  Motor: No weakness  Gait: Gait normal       Deep Tendon Reflexes: Reflexes normal    Psychiatric:         Mood and Affect: Mood normal          Behavior: Behavior normal          Thought Content:  Thought content normal          Judgment: Judgment normal        Eitan Fuller MD

## 2023-06-14 NOTE — TELEPHONE ENCOUNTER
Patients GI provider:  Dr July Welch    Number to return call: [phone]    Reason for call: Dr Marlin Virk calling to advise that he believes patient has an esophageal stricture  He would her scheduled for and EGD  Please call patient ASAP to discuss/schedule      Scheduled procedure/appointment date if applicable: Apt/procedure NA

## 2023-06-15 ENCOUNTER — OFFICE VISIT (OUTPATIENT)
Dept: GASTROENTEROLOGY | Facility: AMBULARY SURGERY CENTER | Age: 73
End: 2023-06-15
Payer: MEDICARE

## 2023-06-15 VITALS
DIASTOLIC BLOOD PRESSURE: 74 MMHG | HEIGHT: 62 IN | HEART RATE: 73 BPM | SYSTOLIC BLOOD PRESSURE: 128 MMHG | OXYGEN SATURATION: 95 % | WEIGHT: 192.2 LBS | BODY MASS INDEX: 35.37 KG/M2

## 2023-06-15 DIAGNOSIS — R11.0 NAUSEA: Primary | ICD-10-CM

## 2023-06-15 DIAGNOSIS — R05.3 CHRONIC COUGH: ICD-10-CM

## 2023-06-15 DIAGNOSIS — R11.11 VOMITING WITHOUT NAUSEA, UNSPECIFIED VOMITING TYPE: ICD-10-CM

## 2023-06-15 DIAGNOSIS — Z86.010 HISTORY OF COLON POLYPS: ICD-10-CM

## 2023-06-15 PROCEDURE — 99214 OFFICE O/P EST MOD 30 MIN: CPT | Performed by: PHYSICIAN ASSISTANT

## 2023-06-15 RX ORDER — ONDANSETRON 4 MG/1
4 TABLET, FILM COATED ORAL EVERY 8 HOURS PRN
Qty: 20 TABLET | Refills: 0 | Status: SHIPPED | OUTPATIENT
Start: 2023-06-15

## 2023-06-15 NOTE — PROGRESS NOTES
Assessment and Plan    #1  Vomiting and coughing: patient reports almost nightly coughing leading to vomiting after dinner  Unclear etiology, could be uncontrolled GERD versus esophageal dysmotility versus hiatal hernia versus pulmonology cause of coughing leading to vomiting    -At this time would recommend barium esophagram to further assess for dysmotility, esophageal narrowing, GERD, and hiatal hernia less invasively  Last endoscopy 1 year ago noted a 3 cm hiatal hernia  -will give zofran to take prior to barium swallow due to concern for vomiting with the barium   -Continue acid reflux medications, takes Nexium 40 mg nightly  -Antireflux diet and measures  -Recommend pulmonology evaluation for the coughing as her vomiting is always preceded by the cough  -agree with Flonase for the postnasal drip per her PCPs recommendations  -If barium swallow is normal, may need to consider further evaluation with manometry or endoscopy  This was discussed with the patient    #2  History of colon polyps: most recent colonoscopy was in January 2023 with one hyperplastic polyp removed  -no further screening colonoscopies needed, no further bleeding  --------------------------------------------------------------------------------------------------------------------    Chief Complaint: f/u vomiting and coughing    HPI: Maegan Loaiza is a 75 y o  female with a history of GERD, hypothyroidism, hypertension, arthritis, hyperlipidemia, and obesity who presents today for follow up for vomiting  Patient reports that for the last few months she has been having issues in the evening in which she will have an episode of a dry coughing spell that leads to vomiting  This will only happen in the evening after her dinner meal or snack  It is always preceded by coughing first  She has had a chronic cough for the last year since having COVID  She does not have any vomiting any other time of day    She denies any feeling of food getting stuck or any regurgitation of food  She denies any nausea preceding it  She denies any abdominal pain, change in bowel habits, blood in the stool, black tarry stools  She denies any heartburn symptoms and has been taking Nexium 40 mg daily in the evening  She sometimes will feel like she is choking if she drinks water but denies choking on food  She was told by her PCP to start a Flonase inhaler for the phlegm  Sometimes when she is coughing she will bring up phlegm and sometimes only vomit phlegm and not food  She had an EGD 1 year ago with 3 CM hiatal hernia  Last colonoscopy was in January 2023, not recommending further colonoscopies  Review of Systems:   General: negative for fatigue, fever, night sweats or unexpected weight loss  Psychological: negative for anxiety or depression  Ophthalmic: negative for blurry vision or scleral icterus  ENT: negative for headaches, oral lesions, sore throat, vocal changes or dysphagia  Hematological and Lymphatic: negative for pallor or swollen lymph nodes  Respiratory: negative for shortness of breath or wheezing; +cough  Cardiovascular: negative for chest pain, edema or murmur  Gastrointestinal: as mentioned in HPI  Genito-Urinary: negative for dysuria or incontinence  Musculoskeletal: negative for joint stiffness or joint swelling; +joint pain   Dermatological: negative for pruritus, rash, or jaundice    Current Medications  Current Outpatient Medications   Medication Sig Dispense Refill   • Acetaminophen (TYLENOL 8 HOUR ARTHRITIS PAIN PO) Take by mouth As needed     • aspirin 81 MG tablet Take 81 mg by mouth daily       • cholecalciferol (VITAMIN D3) 1,000 units tablet Take 1,000 Units by mouth daily      • cyanocobalamin (VITAMIN B-12) 1,000 mcg tablet Take 1,000 mcg by mouth daily     • Diclofenac Sodium (VOLTAREN) 1 % APPLY 4 G TOPICALLY 2 (TWO) TIMES A DAY AS NEEDED (KNEE PAIN) 100 g 1   • Elderberry 500 MG CAPS 2 capsules in the morning     • esomeprazole (NexIUM) 20 mg capsule Take 20 mg by mouth 2 (two) times a day      • ezetimibe (ZETIA) 10 mg tablet TAKE 1 TABLET BY MOUTH DAILY 90 tablet 1   • fluticasone (FLONASE) 50 mcg/act nasal spray 1 spray into each nostril daily 16 mL 5   • hydrocortisone (ANUSOL-HC) 25 mg suppository Insert 1 suppository (25 mg total) into the rectum 2 (two) times a day as needed for hemorrhoids for up to 6 days 12 suppository 1   • levothyroxine 25 mcg tablet Take 1 tablet (25 mcg total) by mouth daily 90 tablet 1   • LORazepam (ATIVAN) 0 5 mg tablet Take 1 tablet (0 5 mg total) by mouth daily at bedtime 30 tablet 0   • meloxicam (MOBIC) 7 5 mg tablet Take 7 5 mg by mouth daily     • NON FORMULARY CBD cream, applies to B/L knees     • nystatin (MYCOSTATIN) cream Apply topically 2 (two) times a day 30 g 2   • nystatin (MYCOSTATIN) powder Apply topically 2 (two) times a day 60 g 2   • Olopatadine HCl (PATADAY OP) Apply to eye if needed     • rosuvastatin (CRESTOR) 10 MG tablet Take 1 tablet (10 mg total) by mouth 3 (three) times a week Use three times a week 36 tablet 3   • triamcinolone (KENALOG) 0 1 % cream Apply topically 2 (two) times a day 45 g 0   • Zinc 100 MG TABS prn       No current facility-administered medications for this visit         Past Medical History  Past Medical History:   Diagnosis Date   • Allergic Unknown   • Anxiety     Last Assessed: [id number]   • Arthritis     Last Assessed: [id number]   • Colon polyp    • Disease of thyroid gland    • GERD (gastroesophageal reflux disease)     Last Assessed: 31Oct2017   • Headache(784 0) Used ri have migraines in the 70’s abd early 80’s   • Hypothyroidism, adult 10/31/2017   • Insomnia    • Osteoporosis     Last Assessed: 31Oct2017   • Reflux esophagitis    • Seasonal allergies    • Urinary incontinence     Last Assessed: 31Oct2017       Past Surgical History  Past Surgical History:   Procedure Laterality Date   • BREAST SURGERY      biopsy   • CHOLECYSTECTOMY     • COLONOSCOPY     • EXPLORATORY LAPAROTOMY     • GALLBLADDER SURGERY      Last Assessed: [id number]   • INCISIONAL BREAST BIOPSY      Last Assessed: [id number]   • JOINT REPLACEMENT Right    • KNEE SURGERY      Last Assessed: [id number]   • CA INSERTION/RPLCMT PERIPHERAL/GASTRIC NPGR N/A 03/23/2016    Procedure:  REMOVAL IPG BATTERY ;  Surgeon: Malorie Jones MD;  Location: [facility];  Service: UroGynecology          • REPLACEMENT TOTAL KNEE Right 04/29/2019   • ROOT CANAL  03/11/2019   • SHOULDER SURGERY Left    • SHOULDER SURGERY      Last Assessed: [id number]   • TONSILLECTOMY      last Assessed: 31Oct2017   • [address] STUDY  10/03/2014   • WISDOM TOOTH EXTRACTION      Last Assessed: 31Oct2017       Past Social History   Social History     Socioeconomic History   • Marital status:      Spouse name: Not on file   • Number of children: Not on file   • Years of education: Not on file   • Highest education level: Not on file   Occupational History   • Not on file   Tobacco Use   • Smoking status: Never   • Smokeless tobacco: Never   Vaping Use   • Vaping Use: Never used   Substance and Sexual Activity   • Alcohol use: Yes     Comment: social drinking only   • Drug use: Never   • Sexual activity: Yes     Partners: Male     Birth control/protection: Post-menopausal, None   Other Topics Concern   • Not on file   Social History Narrative   • Not on file     Social Determinants of Health     Financial Resource Strain: Low Risk  (10/26/2022)    Overall Financial Resource Strain (CARDIA)    • Difficulty of Paying Living Expenses: Not hard at all   Food Insecurity: Not on file   Transportation Needs: No Transportation Needs (10/26/2022)    PRAPARE - Transportation    • Lack of Transportation (Medical): No    • Lack of Transportation (Non-Medical):  No   Physical Activity: Not on file   Stress: Not on file   Social Connections: Not on file   Intimate Partner Violence: Not on file   Housing Stability: "Not on file       The following portions of the patient's history were reviewed and updated as appropriate: allergies, current medications, past family history, past medical history, past social history, past surgical history and problem list     Vital Signs  /74   BP Location Right arm   Patient Position Sitting   Cuff Size Standard   Pulse 73   SpO2 95 %   Weight  87 2 kg (192 lb 3 2 oz)   Height 5' 2\" (1 575 m)   Pain Score 0-No pain         Physical Exam:  General appearance: alert, cooperative, no distress  HEENT: normocephalic, anicteric, no eye erythema or discharge, no oropharyngeal thrush  Neck: supple, trachea midline, no adenopathy  Lungs: CTA b/l, no rales, rhonchi, or wheezing, unlabored respirations  Heart: RRR, no murmur, rubs, or gallops  Abdomen: soft, non-tender, non-distended, normal bowel sounds, no masses or organomegaly  Rectal: deferred  Extremities: no cyanosis, clubbing, or edema  Musculoskeletal: normal gait  Skin: color and texture normal, no jaundice, no rashes or lesions  Psychiatric: alert and oriented, normal affect and behavior                                                                    "

## 2023-06-15 NOTE — LETTER
Zena 15, 2023     MD Carole Nichols 89  [address] [address]    Patient: Nata Chisholm   YOB: 1950   Date of Visit: 6/15/2023       Dear Dr Georges Sanchez: Thank you for referring Jesus Sandoval to me for evaluation  Below are my notes for this consultation  If you have questions, please do not hesitate to call me  I look forward to following your patient along with you  Sincerely,        Gino Cranker, PA-C        CC: No Recipients    Gino Cranker, PA-C  6/15/2023  1:16 PM  Incomplete  Assessment and Plan    #1  Vomiting and coughing: patient reports almost nightly coughing leading to vomiting after dinner  Unclear etiology, could be uncontrolled GERD versus esophageal dysmotility versus hiatal hernia versus pulmonology cause of coughing leading to vomiting    -At this time would recommend barium esophagram to further assess for dysmotility, esophageal narrowing, GERD, and hiatal hernia less invasively  Last endoscopy 1 year ago noted a 3 cm hiatal hernia  -will give zofran to take prior to barium swallow due to concern for vomiting with the barium   -Continue acid reflux medications, takes Nexium 40 mg nightly  -Antireflux diet and measures  -Recommend pulmonology evaluation for the coughing as her vomiting is always preceded by the cough  -agree with Flonase for the postnasal drip per her PCPs recommendations  -If barium swallow is normal, may need to consider further evaluation with manometry or endoscopy  This was discussed with the patient    #2   History of colon polyps: most recent colonoscopy was in January 2023 with one hyperplastic polyp removed  -no further screening colonoscopies needed, no further bleeding  --------------------------------------------------------------------------------------------------------------------    Chief Complaint: f/u vomiting and coughing    HPI: Nata Chisholm is a 75 y o  female with a history of GERD, hypothyroidism, hypertension, arthritis, hyperlipidemia, and obesity who presents today for follow up for vomiting  Patient reports that for the last few months she has been having issues in the evening in which she will have an episode of a dry coughing spell that leads to vomiting  This will only happen in the evening after her dinner meal or snack  It is always preceded by coughing first  She has had a chronic cough for the last year since having COVID  She does not have any vomiting any other time of day  She denies any feeling of food getting stuck or any regurgitation of food  She denies any nausea preceding it  She denies any abdominal pain, change in bowel habits, blood in the stool, black tarry stools  She denies any heartburn symptoms and has been taking Nexium 40 mg daily in the evening  She sometimes will feel like she is choking if she drinks water but denies choking on food  She was told by her PCP to start a Flonase inhaler for the phlegm  Sometimes when she is coughing she will bring up phlegm and sometimes only vomit phlegm and not food  She had an EGD 1 year ago with 3 CM hiatal hernia  Last colonoscopy was in January 2023, not recommending further colonoscopies        Review of Systems:   General: negative for fatigue, fever, night sweats or unexpected weight loss  Psychological: negative for anxiety or depression  Ophthalmic: negative for blurry vision or scleral icterus  ENT: negative for headaches, oral lesions, sore throat, vocal changes or dysphagia  Hematological and Lymphatic: negative for pallor or swollen lymph nodes  Respiratory: negative for shortness of breath or wheezing; +cough  Cardiovascular: negative for chest pain, edema or murmur  Gastrointestinal: as mentioned in HPI  Genito-Urinary: negative for dysuria or incontinence  Musculoskeletal: negative for joint stiffness or joint swelling; +joint pain   Dermatological: negative for pruritus, rash, or jaundice    Current Medications  Current Outpatient Medications   Medication Sig Dispense Refill   • Acetaminophen (TYLENOL 8 HOUR ARTHRITIS PAIN PO) Take by mouth As needed     • aspirin 81 MG tablet Take 81 mg by mouth daily  • cholecalciferol (VITAMIN D3) 1,000 units tablet Take 1,000 Units by mouth daily      • cyanocobalamin (VITAMIN B-12) 1,000 mcg tablet Take 1,000 mcg by mouth daily     • Diclofenac Sodium (VOLTAREN) 1 % APPLY 4 G TOPICALLY 2 (TWO) TIMES A DAY AS NEEDED (KNEE PAIN) 100 g 1   • Elderberry 500 MG CAPS 2 capsules in the morning     • esomeprazole (NexIUM) 20 mg capsule Take 20 mg by mouth 2 (two) times a day      • ezetimibe (ZETIA) 10 mg tablet TAKE 1 TABLET BY MOUTH DAILY 90 tablet 1   • fluticasone (FLONASE) 50 mcg/act nasal spray 1 spray into each nostril daily 16 mL 5   • hydrocortisone (ANUSOL-HC) 25 mg suppository Insert 1 suppository (25 mg total) into the rectum 2 (two) times a day as needed for hemorrhoids for up to 6 days 12 suppository 1   • levothyroxine 25 mcg tablet Take 1 tablet (25 mcg total) by mouth daily 90 tablet 1   • LORazepam (ATIVAN) 0 5 mg tablet Take 1 tablet (0 5 mg total) by mouth daily at bedtime 30 tablet 0   • meloxicam (MOBIC) 7 5 mg tablet Take 7 5 mg by mouth daily     • NON FORMULARY CBD cream, applies to B/L knees     • nystatin (MYCOSTATIN) cream Apply topically 2 (two) times a day 30 g 2   • nystatin (MYCOSTATIN) powder Apply topically 2 (two) times a day 60 g 2   • Olopatadine HCl (PATADAY OP) Apply to eye if needed     • rosuvastatin (CRESTOR) 10 MG tablet Take 1 tablet (10 mg total) by mouth 3 (three) times a week Use three times a week 36 tablet 3   • triamcinolone (KENALOG) 0 1 % cream Apply topically 2 (two) times a day 45 g 0   • Zinc 100 MG TABS prn       No current facility-administered medications for this visit         Past Medical History  Past Medical History:   Diagnosis Date   • Allergic Unknown   • Anxiety     Last Assessed: [id number]   • Arthritis     Last Assessed: [id number]   • Colon polyp    • Disease of thyroid gland    • GERD (gastroesophageal reflux disease)     Last Assessed: 31Oct2017   • Headache(784 0) Used ri have migraines in the 70’s abd early 80’s   • Hypothyroidism, adult 10/31/2017   • Insomnia    • Osteoporosis     Last Assessed: [id number]   • Reflux esophagitis    • Seasonal allergies    • Urinary incontinence     Last Assessed: 31Oct2017       Past Surgical History  Past Surgical History:   Procedure Laterality Date   • BREAST SURGERY      biopsy   • CHOLECYSTECTOMY     • COLONOSCOPY     • EXPLORATORY LAPAROTOMY     • GALLBLADDER SURGERY      Last Assessed: [id number]   • INCISIONAL BREAST BIOPSY      Last Assessed: [id number]   • JOINT REPLACEMENT Right    • KNEE SURGERY      Last Assessed: 31Oct2017   • HI INSERTION/RPLCMT PERIPHERAL/GASTRIC NPGR N/A 03/23/2016    Procedure:  REMOVAL IPG BATTERY ;  Surgeon: Adiel Espinoza MD;  Location: [facility];  Service: UroGynecology          • REPLACEMENT TOTAL KNEE Right 04/29/2019   • ROOT CANAL  03/11/2019   • SHOULDER SURGERY Left    • SHOULDER SURGERY      Last Assessed: 31Oct2017   • TONSILLECTOMY      last Assessed: 31Oct2017   • [facility] STUDY  10/03/2014   • WISDOM TOOTH EXTRACTION      Last Assessed: 31Oct2017       Past Social History   Social History     Socioeconomic History   • Marital status:      Spouse name: Not on file   • Number of children: Not on file   • Years of education: Not on file   • Highest education level: Not on file   Occupational History   • Not on file   Tobacco Use   • Smoking status: Never   • Smokeless tobacco: Never   Vaping Use   • Vaping Use: Never used   Substance and Sexual Activity   • Alcohol use: Yes     Comment: social drinking only   • Drug use: Never   • Sexual activity: Yes     Partners: Male     Birth control/protection: Post-menopausal, None   Other Topics Concern   • Not on file   Social History Narrative   • Not on file "    Social Determinants of Health     Financial Resource Strain: Low Risk  (10/26/2022)    Overall Financial Resource Strain (CARDIA)    • Difficulty of Paying Living Expenses: Not hard at all   Food Insecurity: Not on file   Transportation Needs: No Transportation Needs (10/26/2022)    PRAPARE - Transportation    • Lack of Transportation (Medical): No    • Lack of Transportation (Non-Medical): No   Physical Activity: Not on file   Stress: Not on file   Social Connections: Not on file   Intimate Partner Violence: Not on file   Housing Stability: Not on file       The following portions of the patient's history were reviewed and updated as appropriate: allergies, current medications, past family history, past medical history, past social history, past surgical history and problem list     Vital Signs  /74   BP Location Right arm   Patient Position Sitting   Cuff Size Standard   Pulse 73   SpO2 95 %   Weight  87 2 kg (192 lb 3 2 oz)   Height 5' 2\" (1 575 m)   Pain Score 0-No pain         Physical Exam:  General appearance: alert, cooperative, no distress  HEENT: normocephalic, anicteric, no eye erythema or discharge, no oropharyngeal thrush  Neck: supple, trachea midline, no adenopathy  Lungs: CTA b/l, no rales, rhonchi, or wheezing, unlabored respirations  Heart: RRR, no murmur, rubs, or gallops  Abdomen: soft, non-tender, non-distended, normal bowel sounds, no masses or organomegaly  Rectal: deferred  Extremities: no cyanosis, clubbing, or edema  Musculoskeletal: normal gait  Skin: color and texture normal, no jaundice, no rashes or lesions  Psychiatric: alert and oriented, normal affect and behavior                                                                      Faisal Lewis PA-C  6/15/2023  1:14 PM  Sign when Signing Visit  Assessment and Plan    #1  Vomiting and coughing: patient reports almost nightly coughing leading to vomiting after dinner   Unclear etiology, could be uncontrolled GERD versus " esophageal dysmotility versus hiatal hernia versus pulmonology cause of coughing leading to vomiting    -At this time would recommend barium esophagram to further assess for dysmotility, esophageal narrowing, GERD, and hiatal hernia less invasively  Last endoscopy 1 year ago noted a 3 cm hiatal hernia   -Continue acid reflux medications, takes Nexium 40 mg nightly  -Antireflux diet and measures  -Recommend pulmonology evaluation for the coughing as her vomiting is always preceded by the cough  -agree with Flonase for the postnasal drip per her PCPs recommendations  -If barium swallow is normal, may need to consider further evaluation with manometry or endoscopy  This was discussed with the patient    #2  History of colon polyps: most recent colonoscopy was in January 2023 with one hyperplastic polyp removed  -no further screening colonoscopies needed, no further bleeding  --------------------------------------------------------------------------------------------------------------------    Chief Complaint: f/u vomiting and coughing    HPI: Kelly Castro is a 75 y o  female with a history of GERD, hypothyroidism, hypertension, arthritis, hyperlipidemia, and obesity who presents today for follow up for vomiting  Patient reports that for the last few months she has been having issues in the evening in which she will have an episode of a dry coughing spell that leads to vomiting  This will only happen in the evening after her dinner meal or snack  It is always preceded by coughing first  She has had a chronic cough for the last year since having COVID  She does not have any vomiting any other time of day  She denies any feeling of food getting stuck or any regurgitation of food  She denies any nausea preceding it  She denies any abdominal pain, change in bowel habits, blood in the stool, black tarry stools  She denies any heartburn symptoms and has been taking Nexium 40 mg daily in the evening    She sometimes will feel like she is choking if she drinks water but denies choking on food  She was told by her PCP to start a Flonase inhaler for the phlegm  Sometimes when she is coughing she will bring up phlegm and sometimes only vomit phlegm and not food  She had an EGD 1 year ago with 3 CM hiatal hernia  Last colonoscopy was in January 2023, not recommending further colonoscopies  Review of Systems:   General: negative for fatigue, fever, night sweats or unexpected weight loss  Psychological: negative for anxiety or depression  Ophthalmic: negative for blurry vision or scleral icterus  ENT: negative for headaches, oral lesions, sore throat, vocal changes or dysphagia  Hematological and Lymphatic: negative for pallor or swollen lymph nodes  Respiratory: negative for shortness of breath or wheezing; +cough  Cardiovascular: negative for chest pain, edema or murmur  Gastrointestinal: as mentioned in HPI  Genito-Urinary: negative for dysuria or incontinence  Musculoskeletal: negative for joint stiffness or joint swelling; +joint pain   Dermatological: negative for pruritus, rash, or jaundice    Current Medications  Current Outpatient Medications   Medication Sig Dispense Refill   • Acetaminophen (TYLENOL 8 HOUR ARTHRITIS PAIN PO) Take by mouth As needed     • aspirin 81 MG tablet Take 81 mg by mouth daily       • cholecalciferol (VITAMIN D3) 1,000 units tablet Take 1,000 Units by mouth daily      • cyanocobalamin (VITAMIN B-12) 1,000 mcg tablet Take 1,000 mcg by mouth daily     • Diclofenac Sodium (VOLTAREN) 1 % APPLY 4 G TOPICALLY 2 (TWO) TIMES A DAY AS NEEDED (KNEE PAIN) 100 g 1   • Elderberry 500 MG CAPS 2 capsules in the morning     • esomeprazole (NexIUM) 20 mg capsule Take 20 mg by mouth 2 (two) times a day      • ezetimibe (ZETIA) 10 mg tablet TAKE 1 TABLET BY MOUTH DAILY 90 tablet 1   • fluticasone (FLONASE) 50 mcg/act nasal spray 1 spray into each nostril daily 16 mL 5   • hydrocortisone (ANUSOL-HC) 25 mg suppository Insert 1 suppository (25 mg total) into the rectum 2 (two) times a day as needed for hemorrhoids for up to 6 days 12 suppository 1   • levothyroxine 25 mcg tablet Take 1 tablet (25 mcg total) by mouth daily 90 tablet 1   • LORazepam (ATIVAN) 0 5 mg tablet Take 1 tablet (0 5 mg total) by mouth daily at bedtime 30 tablet 0   • meloxicam (MOBIC) 7 5 mg tablet Take 7 5 mg by mouth daily     • NON FORMULARY CBD cream, applies to B/L knees     • nystatin (MYCOSTATIN) cream Apply topically 2 (two) times a day 30 g 2   • nystatin (MYCOSTATIN) powder Apply topically 2 (two) times a day 60 g 2   • Olopatadine HCl (PATADAY OP) Apply to eye if needed     • rosuvastatin (CRESTOR) 10 MG tablet Take 1 tablet (10 mg total) by mouth 3 (three) times a week Use three times a week 36 tablet 3   • triamcinolone (KENALOG) 0 1 % cream Apply topically 2 (two) times a day 45 g 0   • Zinc 100 MG TABS prn       No current facility-administered medications for this visit         Past Medical History  Past Medical History:   Diagnosis Date   • Allergic Unknown   • Anxiety     Last Assessed: [id number]   • Arthritis     Last Assessed: [id number]   • Colon polyp    • Disease of thyroid gland    • GERD (gastroesophageal reflux disease)     Last Assessed: 31Oct2017   • Headache(784 0) Used ri have migraines in the 70’s abd early 80’s   • Hypothyroidism, adult 10/31/2017   • Insomnia    • Osteoporosis     Last Assessed: 31Oct2017   • Reflux esophagitis    • Seasonal allergies    • Urinary incontinence     Last Assessed: 31Oct2017       Past Surgical History  Past Surgical History:   Procedure Laterality Date   • BREAST SURGERY      biopsy   • CHOLECYSTECTOMY     • COLONOSCOPY     • EXPLORATORY LAPAROTOMY     • GALLBLADDER SURGERY      Last Assessed: 31Oct2017   • INCISIONAL BREAST BIOPSY      Last Assessed: [id number]   • JOINT REPLACEMENT Right    • KNEE SURGERY      Last Assessed: 31Oct2017   • MS INSERTION/RPLCMT PERIPHERAL/GASTRIC NPGR N/A 03/23/2016    Procedure:  REMOVAL IPG BATTERY ;  Surgeon: Lee Sheth MD;  Location: [facility];  Service: UroGynecology          • REPLACEMENT TOTAL KNEE Right 04/29/2019   • ROOT CANAL  03/11/2019   • SHOULDER SURGERY Left    • SHOULDER SURGERY      Last Assessed: [id number]   • TONSILLECTOMY      last Assessed: 31Oct2017   • [facility] STUDY  10/03/2014   • WISDOM TOOTH EXTRACTION      Last Assessed: 31Oct2017       Past Social History   Social History     Socioeconomic History   • Marital status:      Spouse name: Not on file   • Number of children: Not on file   • Years of education: Not on file   • Highest education level: Not on file   Occupational History   • Not on file   Tobacco Use   • Smoking status: Never   • Smokeless tobacco: Never   Vaping Use   • Vaping Use: Never used   Substance and Sexual Activity   • Alcohol use: Yes     Comment: social drinking only   • Drug use: Never   • Sexual activity: Yes     Partners: Male     Birth control/protection: Post-menopausal, None   Other Topics Concern   • Not on file   Social History Narrative   • Not on file     Social Determinants of Health     Financial Resource Strain: Low Risk  (10/26/2022)    Overall Financial Resource Strain (CARDIA)    • Difficulty of Paying Living Expenses: Not hard at all   Food Insecurity: Not on file   Transportation Needs: No Transportation Needs (10/26/2022)    PRAPARE - Transportation    • Lack of Transportation (Medical): No    • Lack of Transportation (Non-Medical):  No   Physical Activity: Not on file   Stress: Not on file   Social Connections: Not on file   Intimate Partner Violence: Not on file   Housing Stability: Not on file       The following portions of the patient's history were reviewed and updated as appropriate: allergies, current medications, past family history, past medical history, past social history, past surgical history and problem list     Vital Signs  /74   BP "Location Right arm   Patient Position Sitting   Cuff Size Standard   Pulse 73   SpO2 95 %   Weight  87 2 kg (192 lb 3 2 oz)   Height 5' 2\" (1 575 m)   Pain Score 0-No pain         Physical Exam:  General appearance: alert, cooperative, no distress  HEENT: normocephalic, anicteric, no eye erythema or discharge, no oropharyngeal thrush  Neck: supple, trachea midline, no adenopathy  Lungs: CTA b/l, no rales, rhonchi, or wheezing, unlabored respirations  Heart: RRR, no murmur, rubs, or gallops  Abdomen: soft, non-tender, non-distended, normal bowel sounds, no masses or organomegaly  Rectal: deferred  Extremities: no cyanosis, clubbing, or edema  Musculoskeletal: normal gait  Skin: color and texture normal, no jaundice, no rashes or lesions  Psychiatric: alert and oriented, normal affect and behavior                                                                    "

## 2023-06-15 NOTE — TELEPHONE ENCOUNTER
Spoke to patient and she is aware of appointment made for today, Thursday June 15th ,2023 at [address] [address] at [place] location

## 2023-06-26 ENCOUNTER — PROCEDURE VISIT (OUTPATIENT)
Age: 73
End: 2023-06-26
Payer: MEDICARE

## 2023-06-26 VITALS
HEART RATE: 67 BPM | SYSTOLIC BLOOD PRESSURE: 144 MMHG | WEIGHT: 192 LBS | DIASTOLIC BLOOD PRESSURE: 86 MMHG | HEIGHT: 62 IN | BODY MASS INDEX: 35.33 KG/M2

## 2023-06-26 DIAGNOSIS — M79.18 MYOFASCIAL PAIN: ICD-10-CM

## 2023-06-26 DIAGNOSIS — G89.29 CHRONIC BILATERAL THORACIC BACK PAIN: ICD-10-CM

## 2023-06-26 DIAGNOSIS — M54.2 NECK PAIN: ICD-10-CM

## 2023-06-26 DIAGNOSIS — M99.01 SEGMENTAL DYSFUNCTION OF CERVICAL REGION: ICD-10-CM

## 2023-06-26 DIAGNOSIS — M99.03 SEGMENTAL DYSFUNCTION OF LUMBAR REGION: ICD-10-CM

## 2023-06-26 DIAGNOSIS — M54.6 CHRONIC BILATERAL THORACIC BACK PAIN: ICD-10-CM

## 2023-06-26 DIAGNOSIS — M54.16 LUMBAR RADICULOPATHY: ICD-10-CM

## 2023-06-26 DIAGNOSIS — M99.04 SEGMENTAL DYSFUNCTION OF SACRAL REGION: ICD-10-CM

## 2023-06-26 DIAGNOSIS — G89.29 CHRONIC RIGHT-SIDED LOW BACK PAIN WITH RIGHT-SIDED SCIATICA: ICD-10-CM

## 2023-06-26 DIAGNOSIS — M99.02 SEGMENTAL DYSFUNCTION OF THORACIC REGION: ICD-10-CM

## 2023-06-26 DIAGNOSIS — M54.41 CHRONIC RIGHT-SIDED LOW BACK PAIN WITH RIGHT-SIDED SCIATICA: ICD-10-CM

## 2023-06-26 DIAGNOSIS — M99.05 SEGMENTAL DYSFUNCTION OF PELVIC REGION: Primary | ICD-10-CM

## 2023-06-26 PROCEDURE — 98941 CHIROPRACT MANJ 3-4 REGIONS: CPT | Performed by: CHIROPRACTOR

## 2023-06-26 NOTE — PROGRESS NOTES
Date of first visit: 5/3/2023      HPI:  Chani Huerta presents for treatment today describes neck pain upper back pain tightness into the right shoulder blade    Relates right-sided low back pain into the right hip he has been having ongoing issues with her knees  Recent onset left foot pain seen in orthopedics as well as podiatry she is in a walking boot right this moment with a 2-week follow-up set  We will be beginning physical therapy for her lower extremities  Notes her foot pain has been improved but increased left-sided posterior thigh pain and low back pain  The following portions of the patient's history were reviewed and updated as appropriate: allergies, current medications, past family history, past medical history, past social history, past surgical history, and problem list     Review of Systems    Physical Exam:  Pelvic obliquity elevated right versus left innominate leg inequality at this point function right parasacral region biomechanically joint dysfunction present in the right sacroiliac joint L5-S1 motion unit  Partial involvement cervical thoracic region active triggers in the right trapezius levator scapula and rhomboid  Joint dysfunction C5-C6    Assessment:   Diagnosis ICD-10-CM Associated Orders   1  Segmental dysfunction of pelvic region  M99 05       2  Lumbar radiculopathy  M54 16       3  Segmental dysfunction of sacral region  M99 04       4  Segmental dysfunction of lumbar region  M99 03       5  Chronic right-sided low back pain with right-sided sciatica  M54 41     G89 29       6  Chronic bilateral thoracic back pain  M54 6     G89 29       7  Segmental dysfunction of thoracic region  M99 02       8  Segmental dysfunction of cervical region  M99 01       9  Neck pain  M54 2       10  Myofascial pain  M79 18                 Treatment: 28851  Manipulation right innominate, sacrum, L5 L4 well-tolerated  Manipulation C5-C6 well-tolerated      Discussion:  Reviewed home stretching with Clark Caputo she will continue this I will see her back 3-week follow-up visit

## 2023-07-17 ENCOUNTER — PROCEDURE VISIT (OUTPATIENT)
Age: 73
End: 2023-07-17
Payer: MEDICARE

## 2023-07-17 DIAGNOSIS — M54.41 CHRONIC RIGHT-SIDED LOW BACK PAIN WITH RIGHT-SIDED SCIATICA: ICD-10-CM

## 2023-07-17 DIAGNOSIS — M54.2 NECK PAIN: ICD-10-CM

## 2023-07-17 DIAGNOSIS — M54.6 CHRONIC BILATERAL THORACIC BACK PAIN: ICD-10-CM

## 2023-07-17 DIAGNOSIS — G89.29 CHRONIC BILATERAL THORACIC BACK PAIN: ICD-10-CM

## 2023-07-17 DIAGNOSIS — M99.03 SEGMENTAL DYSFUNCTION OF LUMBAR REGION: ICD-10-CM

## 2023-07-17 DIAGNOSIS — M99.04 SEGMENTAL DYSFUNCTION OF SACRAL REGION: ICD-10-CM

## 2023-07-17 DIAGNOSIS — M99.02 SEGMENTAL DYSFUNCTION OF THORACIC REGION: ICD-10-CM

## 2023-07-17 DIAGNOSIS — M79.18 MYOFASCIAL PAIN: ICD-10-CM

## 2023-07-17 DIAGNOSIS — M99.05 SEGMENTAL DYSFUNCTION OF PELVIC REGION: Primary | ICD-10-CM

## 2023-07-17 DIAGNOSIS — M99.01 SEGMENTAL DYSFUNCTION OF CERVICAL REGION: ICD-10-CM

## 2023-07-17 DIAGNOSIS — G89.29 CHRONIC RIGHT-SIDED LOW BACK PAIN WITH RIGHT-SIDED SCIATICA: ICD-10-CM

## 2023-07-17 DIAGNOSIS — M54.16 LUMBAR RADICULOPATHY: ICD-10-CM

## 2023-07-17 PROCEDURE — 98941 CHIROPRACT MANJ 3-4 REGIONS: CPT | Performed by: CHIROPRACTOR

## 2023-07-17 NOTE — PROGRESS NOTES
Date of first visit: 5/3/2023      HPI:  Worked in for treatment left-sided low back pain she is in a walking boot this is exacerbating her gait and in turn low back pain stiff and tight in the neck upper back. VPAS  8      The following portions of the patient's history were reviewed and updated as appropriate: allergies, current medications, past family history, past medical history, past social history, past surgical history, and problem list.    Review of Systems    Physical Exam:  Pelvic obliquity elevated right versus left innominate leg inequality at this point function right parasacral region biomechanically joint dysfunction present in the right sacroiliac joint L5-S1 motion unit. Partial involvement cervical thoracic region active triggers in the right trapezius levator scapula and rhomboid. Joint dysfunction C5-C6    Assessment:   Diagnosis ICD-10-CM Associated Orders   1. Segmental dysfunction of pelvic region  M99.05       2. Lumbar radiculopathy  M54.16       3. Segmental dysfunction of sacral region  M99.04       4. Segmental dysfunction of lumbar region  M99.03       5. Chronic right-sided low back pain with right-sided sciatica  M54.41     G89.29       6. Chronic bilateral thoracic back pain  M54.6     G89.29       7. Segmental dysfunction of thoracic region  M99.02       8. Segmental dysfunction of cervical region  M99.01       9. Neck pain  M54.2       10. Myofascial pain  M79.18                 Treatment: 89831  Manipulation right innominate, sacrum, L5 L4 well-tolerated.  .    Discussion:  Reviewed home stretching with Krystina she will continue this I will see her back 3-week follow-up visit

## 2023-07-25 DIAGNOSIS — E03.8 SUBCLINICAL HYPOTHYROIDISM: ICD-10-CM

## 2023-07-25 RX ORDER — LEVOTHYROXINE SODIUM 0.03 MG/1
25 TABLET ORAL DAILY
Qty: 90 TABLET | Refills: 1 | Status: SHIPPED | OUTPATIENT
Start: 2023-07-25

## 2023-07-31 ENCOUNTER — PROCEDURE VISIT (OUTPATIENT)
Age: 73
End: 2023-07-31
Payer: MEDICARE

## 2023-07-31 VITALS — WEIGHT: 192 LBS | HEIGHT: 62 IN | BODY MASS INDEX: 35.33 KG/M2

## 2023-07-31 DIAGNOSIS — M99.03 SEGMENTAL DYSFUNCTION OF LUMBAR REGION: ICD-10-CM

## 2023-07-31 DIAGNOSIS — M54.6 CHRONIC BILATERAL THORACIC BACK PAIN: ICD-10-CM

## 2023-07-31 DIAGNOSIS — G89.29 CHRONIC RIGHT-SIDED LOW BACK PAIN WITH RIGHT-SIDED SCIATICA: ICD-10-CM

## 2023-07-31 DIAGNOSIS — M79.18 MYOFASCIAL PAIN: ICD-10-CM

## 2023-07-31 DIAGNOSIS — M99.01 SEGMENTAL DYSFUNCTION OF CERVICAL REGION: ICD-10-CM

## 2023-07-31 DIAGNOSIS — M54.2 NECK PAIN: ICD-10-CM

## 2023-07-31 DIAGNOSIS — M54.41 CHRONIC RIGHT-SIDED LOW BACK PAIN WITH RIGHT-SIDED SCIATICA: ICD-10-CM

## 2023-07-31 DIAGNOSIS — M99.05 SEGMENTAL DYSFUNCTION OF PELVIC REGION: Primary | ICD-10-CM

## 2023-07-31 DIAGNOSIS — M99.02 SEGMENTAL DYSFUNCTION OF THORACIC REGION: ICD-10-CM

## 2023-07-31 DIAGNOSIS — M54.16 LUMBAR RADICULOPATHY: ICD-10-CM

## 2023-07-31 DIAGNOSIS — G89.29 CHRONIC BILATERAL THORACIC BACK PAIN: ICD-10-CM

## 2023-07-31 DIAGNOSIS — M99.04 SEGMENTAL DYSFUNCTION OF SACRAL REGION: ICD-10-CM

## 2023-07-31 PROCEDURE — 98941 CHIROPRACT MANJ 3-4 REGIONS: CPT | Performed by: CHIROPRACTOR

## 2023-07-31 NOTE — PROGRESS NOTES
Date of first visit: 5/3/2023      HPI:  Karlee Matasen in for treatment left-sided low back pain. She continues with a left fourth metatarsal nondisplaced stress fracture. She is out of the walking boot has reassessment this week she did continues to get burning pain into the left foot notes ongoing back pain. Neck is stiff. VPAS  8      The following portions of the patient's history were reviewed and updated as appropriate: allergies, current medications, past family history, past medical history, past social history, past surgical history, and problem list.    Review of Systems    Physical Exam:  Pelvic obliquity elevated right versus left innominate leg inequality at this point function right parasacral region biomechanically joint dysfunction present in the right sacroiliac joint L5-S1 motion unit. Partial involvement cervical thoracic region active triggers in the right trapezius levator scapula and rhomboid. Joint dysfunction C5-C6    Assessment:   Diagnosis ICD-10-CM Associated Orders   1. Segmental dysfunction of pelvic region  M99.05       2. Lumbar radiculopathy  M54.16       3. Segmental dysfunction of sacral region  M99.04       4. Segmental dysfunction of lumbar region  M99.03       5. Chronic right-sided low back pain with right-sided sciatica  M54.41     G89.29       6. Chronic bilateral thoracic back pain  M54.6     G89.29       7. Segmental dysfunction of thoracic region  M99.02       8. Segmental dysfunction of cervical region  M99.01       9. Neck pain  M54.2       10. Myofascial pain  M79.18                 Treatment: 15713  Manipulation right innominate, sacrum, L5 L4 well-tolerated.  .    Discussion:  Reviewed home stretching with Krystina she will continue this I will see her back 3-week follow-up visit

## 2023-08-14 ENCOUNTER — PROCEDURE VISIT (OUTPATIENT)
Age: 73
End: 2023-08-14
Payer: MEDICARE

## 2023-08-14 VITALS — DIASTOLIC BLOOD PRESSURE: 82 MMHG | SYSTOLIC BLOOD PRESSURE: 128 MMHG | HEART RATE: 72 BPM

## 2023-08-14 DIAGNOSIS — G89.29 CHRONIC BILATERAL THORACIC BACK PAIN: ICD-10-CM

## 2023-08-14 DIAGNOSIS — M54.16 LUMBAR RADICULOPATHY: ICD-10-CM

## 2023-08-14 DIAGNOSIS — G89.29 CHRONIC RIGHT-SIDED LOW BACK PAIN WITH RIGHT-SIDED SCIATICA: ICD-10-CM

## 2023-08-14 DIAGNOSIS — M99.01 SEGMENTAL DYSFUNCTION OF CERVICAL REGION: ICD-10-CM

## 2023-08-14 DIAGNOSIS — M54.2 NECK PAIN: ICD-10-CM

## 2023-08-14 DIAGNOSIS — M54.41 CHRONIC RIGHT-SIDED LOW BACK PAIN WITH RIGHT-SIDED SCIATICA: ICD-10-CM

## 2023-08-14 DIAGNOSIS — M99.02 SEGMENTAL DYSFUNCTION OF THORACIC REGION: ICD-10-CM

## 2023-08-14 DIAGNOSIS — M54.6 CHRONIC BILATERAL THORACIC BACK PAIN: ICD-10-CM

## 2023-08-14 DIAGNOSIS — M99.03 SEGMENTAL DYSFUNCTION OF LUMBAR REGION: ICD-10-CM

## 2023-08-14 DIAGNOSIS — M99.04 SEGMENTAL DYSFUNCTION OF SACRAL REGION: ICD-10-CM

## 2023-08-14 DIAGNOSIS — M79.18 MYOFASCIAL PAIN: ICD-10-CM

## 2023-08-14 DIAGNOSIS — M99.05 SEGMENTAL DYSFUNCTION OF PELVIC REGION: Primary | ICD-10-CM

## 2023-08-14 PROCEDURE — 98941 CHIROPRACT MANJ 3-4 REGIONS: CPT | Performed by: CHIROPRACTOR

## 2023-08-15 ENCOUNTER — HOSPITAL ENCOUNTER (OUTPATIENT)
Dept: RADIOLOGY | Facility: HOSPITAL | Age: 73
Discharge: HOME | End: 2023-08-15
Payer: MEDICARE

## 2023-08-15 DIAGNOSIS — R11.0 NAUSEA: ICD-10-CM

## 2023-08-15 PROCEDURE — 74220 X-RAY XM ESOPHAGUS 1CNTRST: CPT

## 2023-08-16 ENCOUNTER — OFFICE VISIT (OUTPATIENT)
Dept: GASTROENTEROLOGY | Facility: AMBULARY SURGERY CENTER | Age: 73
End: 2023-08-16
Payer: MEDICARE

## 2023-08-16 VITALS
WEIGHT: 193 LBS | HEART RATE: 78 BPM | HEIGHT: 62 IN | OXYGEN SATURATION: 98 % | DIASTOLIC BLOOD PRESSURE: 80 MMHG | SYSTOLIC BLOOD PRESSURE: 138 MMHG | BODY MASS INDEX: 35.51 KG/M2

## 2023-08-16 DIAGNOSIS — K21.9 GASTROESOPHAGEAL REFLUX DISEASE WITHOUT ESOPHAGITIS: Primary | ICD-10-CM

## 2023-08-16 DIAGNOSIS — R13.12 OROPHARYNGEAL DYSPHAGIA: ICD-10-CM

## 2023-08-16 DIAGNOSIS — T17.308A CHOKING, INITIAL ENCOUNTER: ICD-10-CM

## 2023-08-16 PROCEDURE — 99214 OFFICE O/P EST MOD 30 MIN: CPT | Performed by: INTERNAL MEDICINE

## 2023-08-16 RX ORDER — FAMOTIDINE 40 MG/1
40 TABLET, FILM COATED ORAL
Qty: 30 TABLET | Refills: 11 | Status: SHIPPED | OUTPATIENT
Start: 2023-08-16

## 2023-08-16 NOTE — ASSESSMENT & PLAN NOTE
Patient reports having symptoms of choking at times when she eats. She also complains about coughing with gagging and vomiting at times. Unclear if her symptoms are from acid reflux or due to silent aspiration.    -We will refer to speech therapist for video barium swallow    -Advised to take Nexium 20 mg twice daily and Pepcid at bedtime    -Patient was explained about the lifestyle and dietary modifications. Advised to avoid fatty foods, chocolates, caffeine, alcohol and any other triggering foods. Avoid eating for at least 3 hours before going to bed.

## 2023-08-16 NOTE — PROGRESS NOTES
Follow-up Note -  [facility]  Tadeo Wise 1950 female         Reason: Follow-up     HPI:  Ms. Curtis Lynne came in for follow-up. She has been having issues with coughing after dinner when she also starts with gagging and vomiting. She feels like choking at times when she eats. She had barium swallow done which was unremarkable other than small hiatal hernia. There was noted in 7 acid reflux. She takes Nexium 40 mg at nighttime. I did upper endoscopy on her last year. Good appetite, no recent weight loss. She also complains about coughing in the morning and takes Flonase. Denies any abdominal pain, nausea or vomiting. Chaperon: Ms. Salguero Ego: Review of Systems   Constitutional: Negative for activity change, appetite change, chills, diaphoresis, fatigue, fever and unexpected weight change. HENT: Negative for ear discharge, ear pain, facial swelling, hearing loss, nosebleeds, sore throat, tinnitus and voice change. Eyes: Negative for pain, discharge, redness, itching and visual disturbance. Respiratory: Negative for apnea, cough, chest tightness, shortness of breath and wheezing. Cardiovascular: Negative for chest pain and palpitations. Gastrointestinal:        As noted in HPI   Endocrine: Negative for cold intolerance, heat intolerance and polyuria. Genitourinary: Negative for difficulty urinating, dysuria, flank pain, hematuria and urgency. Musculoskeletal: Positive for arthralgias and myalgias. Negative for back pain, gait problem and joint swelling. Skin: Negative for rash and wound. Neurological: Negative for dizziness, tremors, seizures, speech difficulty, light-headedness, numbness and headaches. Hematological: Negative for adenopathy. Does not bruise/bleed easily. Psychiatric/Behavioral: Negative for agitation, behavioral problems and confusion. The patient is not nervous/anxious.          Past Medical History:   Diagnosis Date   • Allergic Unknown   • Anxiety     Last Assessed: [id number]   • Arthritis     Last Assessed: [id number]   • Colon polyp    • Disease of thyroid gland    • GERD (gastroesophageal reflux disease)     Last Assessed: 31Oct2017   • Headache(784.0) Used ri have migraines in the 70’s abd early 80’s   • Hypothyroidism, adult 10/31/2017   • Insomnia    • Osteoporosis     Last Assessed: 31Oct2017   • Reflux esophagitis    • Seasonal allergies    • Urinary incontinence     Last Assessed: 31Oct2017      Past Surgical History:   Procedure Laterality Date   • BREAST SURGERY      biopsy   • CHOLECYSTECTOMY     • COLONOSCOPY     • EXPLORATORY LAPAROTOMY     • GALLBLADDER SURGERY      Last Assessed: [id number]   • INCISIONAL BREAST BIOPSY      Last Assessed: [id number]   • JOINT REPLACEMENT Right    • KNEE SURGERY      Last Assessed: [id number]   • ME INSERTION/RPLCMT PERIPHERAL/GASTRIC NPGR N/A 03/23/2016    Procedure:  REMOVAL IPG BATTERY ;  Surgeon: Laurie Barker MD;  Location: [facility];  Service: UroGynecology          • REPLACEMENT TOTAL KNEE Right 04/29/2019   • ROOT CANAL  03/11/2019   • SHOULDER SURGERY Left    • SHOULDER SURGERY      Last Assessed: 31Oct2017   • TONSILLECTOMY      last Assessed: 31Oct2017   • [facility] STUDY  10/03/2014   • WISDOM TOOTH EXTRACTION      Last Assessed: 31Oct2017     Social History     Socioeconomic History   • Marital status:      Spouse name: Not on file   • Number of children: Not on file   • Years of education: Not on file   • Highest education level: Not on file   Occupational History   • Not on file   Tobacco Use   • Smoking status: Never   • Smokeless tobacco: Never   Vaping Use   • Vaping Use: Never used   Substance and Sexual Activity   • Alcohol use: Yes     Comment: social drinking only   • Drug use: Never   • Sexual activity: Yes     Partners: Male     Birth control/protection: Post-menopausal, None   Other Topics Concern   • Not on file   Social History Narrative   • Not on file     Social Determinants of Health     Financial Resource Strain: Low Risk  (10/26/2022)    Overall Financial Resource Strain (CARDIA)    • Difficulty of Paying Living Expenses: Not hard at all   Food Insecurity: Not on file   Transportation Needs: No Transportation Needs (10/26/2022)    PRAPARE - Transportation    • Lack of Transportation (Medical): No    • Lack of Transportation (Non-Medical): No   Physical Activity: Not on file   Stress: Not on file   Social Connections: Not on file   Intimate Partner Violence: Not on file   Housing Stability: Not on file     Family History   Problem Relation Age of Onset   • Lupus Mother    • Other Mother         Cardiac Disorder   • Heart disease Mother    • Arthritis Mother            • Other Father         Cardiac Disorder   • Diabetes Father         adult diabetes   • Heart disease Father    • Breast cancer Maternal Aunt    • Breast cancer Cousin    • Vision loss Maternal Aunt      Acetazolamide, Fluticasone, Cephalexin, and Sulfa antibiotics  Current Outpatient Medications   Medication Sig Dispense Refill   • Acetaminophen (TYLENOL 8 HOUR ARTHRITIS PAIN PO) Take by mouth As needed     • aspirin 81 MG tablet Take 81 mg by mouth daily.      • cholecalciferol (VITAMIN D3) 1,000 units tablet Take 1,000 Units by mouth daily      • cyanocobalamin (VITAMIN B-12) 1,000 mcg tablet Take 1,000 mcg by mouth daily     • Diclofenac Sodium (VOLTAREN) 1 % APPLY 4 G TOPICALLY 2 (TWO) TIMES A DAY AS NEEDED (KNEE PAIN) 100 g 1   • Elderberry 500 MG CAPS 2 capsules in the morning     • esomeprazole (NexIUM) 20 mg capsule Take 20 mg by mouth 2 (two) times a day      • ezetimibe (ZETIA) 10 mg tablet TAKE 1 TABLET BY MOUTH DAILY 90 tablet 1   • famotidine (PEPCID) 40 MG tablet Take 1 tablet (40 mg total) by mouth daily at bedtime 30 tablet 11   • fluticasone (FLONASE) 50 mcg/act nasal spray 1 spray into each nostril daily 16 mL 5   • hydrocortisone (ANUSOL-HC) 25 mg suppository Insert 1 suppository (25 mg total) into the rectum 2 (two) times a day as needed for hemorrhoids for up to 6 days 12 suppository 1   • levothyroxine 25 mcg tablet Take 1 tablet (25 mcg total) by mouth daily 90 tablet 1   • LORazepam (ATIVAN) 0.5 mg tablet Take 1 tablet (0.5 mg total) by mouth daily at bedtime 30 tablet 0   • meloxicam (MOBIC) 7.5 mg tablet Take 7.5 mg by mouth daily     • NON FORMULARY CBD cream, applies to B/L knees     • nystatin (MYCOSTATIN) cream Apply topically 2 (two) times a day 30 g 2   • nystatin (MYCOSTATIN) powder Apply topically 2 (two) times a day 60 g 2   • Olopatadine HCl (PATADAY OP) Apply to eye if needed     • ondansetron (ZOFRAN) 4 mg tablet Take 1 tablet (4 mg total) by mouth every 8 (eight) hours as needed for nausea or vomiting 20 tablet 0   • rosuvastatin (CRESTOR) 10 MG tablet Take 1 tablet (10 mg total) by mouth 3 (three) times a week Use three times a week 36 tablet 3   • triamcinolone (KENALOG) 0.1 % cream Apply topically 2 (two) times a day 45 g 0   • Zinc 100 MG TABS prn       No current facility-administered medications for this visit. Blood pressure 138/80, pulse 78, height 5' 2" (1.575 m), weight 87.5 kg (193 lb), SpO2 98 %, not currently breastfeeding. PHYSICAL EXAM: Physical Exam  Constitutional:       Appearance: Normal appearance. She is well-developed. HENT:      Head: Normocephalic and atraumatic. Nose: Nose normal.   Eyes:      Conjunctiva/sclera: Conjunctivae normal.   Neck:      Thyroid: No thyromegaly. Vascular: No JVD. Trachea: No tracheal deviation. Cardiovascular:      Rate and Rhythm: Normal rate and regular rhythm. Heart sounds: Normal heart sounds. No murmur heard. No friction rub. No gallop. Pulmonary:      Effort: Pulmonary effort is normal. No respiratory distress. Breath sounds: Normal breath sounds. No wheezing or rales.    Abdominal:      General: Bowel sounds are normal. There is no distension. Palpations: Abdomen is soft. There is no mass. Tenderness: There is no abdominal tenderness. There is no guarding. Hernia: No hernia is present. Musculoskeletal:         General: No tenderness or deformity. Cervical back: Neck supple. Right lower leg: No edema. Left lower leg: No edema. Lymphadenopathy:      Cervical: No cervical adenopathy. Skin:     General: Skin is warm and dry. Findings: No erythema or rash. Neurological:      Mental Status: She is alert and oriented to person, place, and time. Psychiatric:         Mood and Affect: Mood normal.         Behavior: Behavior normal.         Thought Content: Thought content normal.          Lab Results   Component Value Date    WBC 8.87 04/17/2023    HGB 13.0 04/17/2023    HCT 40.9 04/17/2023    MCV 86 04/17/2023     04/17/2023     Lab Results   Component Value Date    CALCIUM 9.1 02/03/2023    K 3.8 02/03/2023    CO2 27 02/03/2023     02/03/2023    BUN 19 02/03/2023    CREATININE 0.70 02/03/2023     Lab Results   Component Value Date    ALT 18 12/20/2021    AST 13 12/20/2021    ALKPHOS 113 12/20/2021     No results found for: "INR", "PROTIME"    FL barium swallow    Result Date: 8/15/2023  Impression: Small hiatal hernia. No spontaneous or provokable gastroesophageal reflux. Workstation performed: [id number]       ASSESSMENT & PLAN:    Gastroesophageal reflux disease without esophagitis  Patient reports having symptoms of choking at times when she eats. She also complains about coughing with gagging and vomiting at times. Unclear if her symptoms are from acid reflux or due to silent aspiration.    -We will refer to speech therapist for video barium swallow    -Advised to take Nexium 20 mg twice daily and Pepcid at bedtime    -Patient was explained about the lifestyle and dietary modifications. Advised to avoid fatty foods, chocolates, caffeine, alcohol and any other triggering foods.   Avoid eating for at least 3 hours before going to bed.

## 2023-08-23 NOTE — PROGRESS NOTES
Date of first visit: 5/3/2023      HPI:  Tomás Rosales in for treatment left-sided low back pain. She continues with a left fourth metatarsal nondisplaced stress fracture. She is out of the walking boot has reassessment this week she did continues to get burning pain into the left foot notes ongoing back pain. Neck is stiff. VPAS  4      The following portions of the patient's history were reviewed and updated as appropriate: allergies, current medications, past family history, past medical history, past social history, past surgical history, and problem list.    Review of Systems    Physical Exam:  Pelvic obliquity elevated right versus left innominate leg inequality at this point function right parasacral region biomechanically joint dysfunction present in the right sacroiliac joint L5-S1 motion unit. Partial involvement cervical thoracic region active triggers in the right trapezius levator scapula and rhomboid. Joint dysfunction C5-C6    Assessment:   Diagnosis ICD-10-CM Associated Orders   1. Segmental dysfunction of pelvic region  M99.05       2. Lumbar radiculopathy  M54.16       3. Segmental dysfunction of sacral region  M99.04       4. Segmental dysfunction of lumbar region  M99.03       5. Chronic right-sided low back pain with right-sided sciatica  M54.41     G89.29       6. Chronic bilateral thoracic back pain  M54.6     G89.29       7. Segmental dysfunction of thoracic region  M99.02       8. Segmental dysfunction of cervical region  M99.01       9. Neck pain  M54.2       10. Myofascial pain  M79.18                 Treatment: 59061  Manipulation right innominate, sacrum, L5 L4 well-tolerated.  .    Discussion:  Reviewed home stretching with Krystina she will continue this I will see her back 3-week follow-up visit

## 2023-08-25 ENCOUNTER — HOSPITAL ENCOUNTER (OUTPATIENT)
Dept: RADIOLOGY | Facility: HOSPITAL | Age: 73
Discharge: HOME | End: 2023-08-25
Attending: INTERNAL MEDICINE
Payer: MEDICARE

## 2023-08-25 DIAGNOSIS — R13.12 OROPHARYNGEAL DYSPHAGIA: ICD-10-CM

## 2023-08-25 PROCEDURE — 92611 MOTION FLUOROSCOPY/SWALLOW: CPT

## 2023-08-25 PROCEDURE — 74230 X-RAY XM SWLNG FUNCJ C+: CPT

## 2023-08-25 NOTE — PROCEDURES
Speech Pathology Videofluoroscopic Swallow Study (VFSS/VBSS/MBSS)      Patient Name: Karla Meléndez    Today's Date: 8/25/2023     Past Medical History  Past Medical History:   Diagnosis Date   • Allergic Unknown   • Anxiety     Last Assessed: [id number]   • Arthritis     Last Assessed: [id number]   • Colon polyp    • Disease of thyroid gland    • GERD (gastroesophageal reflux disease)     Last Assessed: 31Oct2017   • Headache(784.0) Used ri have migraines in the 70’s abd early 80’s   • Hypothyroidism, adult 10/31/2017   • Insomnia    • Osteoporosis     Last Assessed: [id number]   • Reflux esophagitis    • Seasonal allergies    • Urinary incontinence     Last Assessed: 31Oct2017       Past Surgical History  Past Surgical History:   Procedure Laterality Date   • BREAST SURGERY      biopsy   • CHOLECYSTECTOMY     • COLONOSCOPY     • EXPLORATORY LAPAROTOMY     • GALLBLADDER SURGERY      Last Assessed: [id number]   • INCISIONAL BREAST BIOPSY      Last Assessed: [id number]   • JOINT REPLACEMENT Right    • KNEE SURGERY      Last Assessed: [id number]   • IN INSERTION/RPLCMT PERIPHERAL/GASTRIC NPGR N/A 03/23/2016    Procedure:  REMOVAL IPG BATTERY ;  Surgeon: Precious Chaves MD;  Location: [facility];  Service: UroGynecology          • REPLACEMENT TOTAL KNEE Right 04/29/2019   • ROOT CANAL  03/11/2019   • SHOULDER SURGERY Left    • SHOULDER SURGERY      Last Assessed: [id number]   • TONSILLECTOMY      last Assessed: [id number]   • [facility] STUDY  10/03/2014   • WISDOM TOOTH EXTRACTION      Last Assessed: [id number]         General Information; Catrachita Duffy is a 75 y.o. female referred for VFSS 2* complaints of coughing and gagging during meals. Her PMH includes (but is not limited to) GERD and allergies and Esophagram completed 8/15 revealed "Small hiatal hernia. No spontaneous or provokable gastroesophageal reflux."  She remains on her Nexium and Pepcid.      For VFSS, Mrs Joleen Medina was viewed in lateral position and  5-mL thin liquid x2, ~20 mL cup sip thin, 5-mL nectar thick, ~25 mL cup sip nectar thick,  5-mL pudding, ½ cookie coated with 3-mL pudding (x2) and barium tablet with thin liquid/pudding. Oral stage:  No s/s oral stage dysphagia. Mastication was timely and effective with materials administered today. Bolus formation and transfer were functional (piecemeal transfer but no oral residue)  Oral control was also within normal limits during testing today. Helene Rojo Pharyngeal stage:  No s/s pharyngeal dysphagia during testing today. Velar elevation noted. Swallowing initiation was prompt. Laryngeal rise and anterior hyoid excursion appeared adequate. AIrway entrance closure appeared adequate  Tongue base retraction appeared to be of adequate strength. PES opening was adequate. Management of food/liquid/barium tablet follows: All food, liquid and the barium tablet passed through the pharynx safely and efficiently (ie no laryngeal penetration, aspiration or pharyngeal residue noted today). Esophageal stage:  Brief view of esophagus was completed after administration of the barium tablet. The pill passed through the esophagus in a timely manner (mild liquid stasis and intra-esophageal retrograde flow noted)    Assessment Summary:    Mrs Elisha Boston presented with no significant deficits in oropharyngeal swallowing skills (Food, liquid and the barium tablet passed through the pharynx safely and efficiently with no laryngeal penetration, aspiration or pharyngeal residue noted today). Images and impressions were reviewed with Mrs Elisha Boston. She spoke of planned OP evaluation for etiology of cough    Note: Images are available for review in PACS as desired. NOMS (National Outcome Measurement System): Swallowing "Score"  LEVEL 7: The individual’s ability to eat independently is not limited by swallow function.  Swallowing would be safe and efficient for all consistencies. Recommendations:   Recommended Diet:  regular diet and thin liquids   Recommended Form of Medications: whole with liquid /as desired  SLP Dysphagia therapy recommended: None indicated at this time    Thank you for this referral.  Please do not hesitate to contact me with any questions or concerns.     Wilder Vences [facility]   Speech Pathologist  [place] License [id number]  Available via [facility] Text

## 2023-08-31 ENCOUNTER — CONSULT (OUTPATIENT)
Dept: PULMONOLOGY | Facility: CLINIC | Age: 73
End: 2023-08-31
Payer: MEDICARE

## 2023-08-31 VITALS
RESPIRATION RATE: 12 BRPM | OXYGEN SATURATION: 98 % | BODY MASS INDEX: 34.41 KG/M2 | WEIGHT: 187 LBS | SYSTOLIC BLOOD PRESSURE: 142 MMHG | DIASTOLIC BLOOD PRESSURE: 80 MMHG | TEMPERATURE: 98.4 F | HEART RATE: 61 BPM | HEIGHT: 62 IN

## 2023-08-31 DIAGNOSIS — J45.41 MODERATE PERSISTENT REACTIVE AIRWAY DISEASE WITH ACUTE EXACERBATION: Primary | ICD-10-CM

## 2023-08-31 DIAGNOSIS — R05.3 CHRONIC COUGH: ICD-10-CM

## 2023-08-31 PROBLEM — J45.909 REACTIVE AIRWAY DISEASE: Status: ACTIVE | Noted: 2023-08-31

## 2023-08-31 PROCEDURE — 99204 OFFICE O/P NEW MOD 45 MIN: CPT | Performed by: INTERNAL MEDICINE

## 2023-08-31 RX ORDER — ADHESIVE TAPE 3"X 2.3 YD
TAPE, NON-MEDICATED TOPICAL
COMMUNITY

## 2023-08-31 RX ORDER — FLUTICASONE FUROATE AND VILANTEROL 200; 25 UG/1; UG/1
1 POWDER RESPIRATORY (INHALATION) DAILY
Qty: 60 BLISTER | Refills: 3 | Status: SHIPPED | OUTPATIENT
Start: 2023-08-31 | End: 2023-12-29

## 2023-08-31 NOTE — PROGRESS NOTES
Pulmonary Consultation   Celina Millre 75 y.o. female MRN: [id number]  8/31/2023    Referring Physician or Provider:  Ruthann Grubbs PA-C  [address]       Chief Complaint:  Chronic Cough    HPI:    77-year-old female with past medical history of severe GERD, hypothyroidism and morbid obesity presents for evaluation of chronic intermittent sometimes productive cough that started after severe respiratory infection approximately 8 months ago. Patient states that cough is often productive typically with postnasal drip and occasionally causes posttussive emesis. No fevers or chills. No recent illness after respiratory infection approximately 8 months ago. Patient notes that she typically develops "bronchitis" once a year around the same time in the middle of winter. He denies any significant sensitivity to allergens such as pollen, trees, humidity or strong smells. She is a lifelong non-smoker but states that her parents both smoked significantly in her presence when she was young.     Past Medical Hx  Past Medical History:   Diagnosis Date   • Allergic Unknown   • Anxiety     Last Assessed: [id number]   • Arthritis     Last Assessed: [id number]   • Colon polyp    • Disease of thyroid gland    • GERD (gastroesophageal reflux disease)     Last Assessed: 31Oct2017   • Headache(784.0) Used ri have migraines in the 70’s abd early 80’s   • Hypothyroidism, adult 10/31/2017   • Insomnia    • Osteoporosis     Last Assessed: 31Oct2017   • Reflux esophagitis    • Seasonal allergies    • Urinary incontinence     Last Assessed: 31Oct2017           Past Surgical Hx  Past Surgical History:   Procedure Laterality Date   • BREAST SURGERY      biopsy   • CHOLECYSTECTOMY     • COLONOSCOPY     • EXPLORATORY LAPAROTOMY     • GALLBLADDER SURGERY      Last Assessed: 31Oct2017   • INCISIONAL BREAST BIOPSY      Last Assessed: [id number]   • JOINT REPLACEMENT Right    • KNEE SURGERY      Last Assessed: [id number]   • DC INSERTION/RPLCMT PERIPHERAL/GASTRIC NPGR N/A 2016    Procedure:  REMOVAL IPG BATTERY ;  Surgeon: Donovan Rodríguez MD;  Location: [facility];  Service: UroGynecology          • REPLACEMENT TOTAL KNEE Right 2019   • ROOT CANAL  2019   • SHOULDER SURGERY Left    • SHOULDER SURGERY      Last Assessed: [id number]   • TONSILLECTOMY      last Assessed: 2017   • US GUIDED INJECTION FOR RESEARCH STUDY  10/03/2014   • WISDOM TOOTH EXTRACTION      Last Assessed: 2017           Family Hx  Family History   Problem Relation Age of Onset   • Lupus Mother    • Other Mother         Cardiac Disorder   • Heart disease Mother    • Arthritis Mother            • Other Father         Cardiac Disorder   • Diabetes Father         adult diabetes   • Heart disease Father    • Breast cancer Maternal Aunt    • Breast cancer Cousin    • Vision loss Maternal Aunt            Occupational History:   No known previous inhalation injuries      Social History:     Social History     Socioeconomic History   • Marital status:      Spouse name: Not on file   • Number of children: Not on file   • Years of education: Not on file   • Highest education level: Not on file   Occupational History   • Not on file   Tobacco Use   • Smoking status: Never   • Smokeless tobacco: Never   Vaping Use   • Vaping Use: Never used   Substance and Sexual Activity   • Alcohol use: Yes     Comment: social drinking only   • Drug use: Never   • Sexual activity: Yes     Partners: Male     Birth control/protection: Post-menopausal, None   Other Topics Concern   • Not on file   Social History Narrative   • Not on file     Social Determinants of Health     Financial Resource Strain: Low Risk  (10/26/2022)    Overall Financial Resource Strain (CARDIA)    • Difficulty of Paying Living Expenses: Not hard at all   Food Insecurity: Not on file   Transportation Needs: No Transportation Needs (10/26/2022)    PRAPARE - Transportation    • Lack of Transportation (Medical): No    • Lack of Transportation (Non-Medical): No   Physical Activity: Not on file   Stress: Not on file   Social Connections: Not on file   Intimate Partner Violence: Not on file   Housing Stability: Not on file            Pertinent Meds  No inhalers      ROS:  Constitutional: - Fatigue, - chills, - fever, - weight change. HEENT: - rhinorrhea, - sneezing, - sore throat. Respiratory: + Intermittent cough, + sputum production, - shortness of breath, - wheezing. Cardiovascular: - chest pain,  -palpitations, - leg swelling. Gastrointestinal: - abdominal pain, - constipation, - diarrhea, - nausea, - vomiting. Endocrine: - cold intolerance, - heat intolerance. Genitourinary: - dysuria. Musculoskeletal: - arthralgias. Skin:- rash, - wound. Allergic/Immunologic: - allergies  Neurological: - dizziness, - numbness      Vitals: Blood pressure 142/80, pulse 61, temperature 98.4 °F (36.9 °C), temperature source Temporal, resp. rate 12, height 5' 2" (1.575 m), weight 84.8 kg (187 lb), SpO2 98 %, not currently breastfeeding., Body mass index is 34.2 kg/m². Physical Exam  GEN  NAD  HEENT  ncat, non icteric, MM moist  NECK  supple, no JVD, no LAD  CV  +s1s2, no mrg, RRR  PULM  CTA BL, no wrr  ABD  soft, nt, + obese, + BS  EXT + lower extremity pitting edema, no cyanosis, no clubbing  NEURO  Aox3, no focal weakness    Imaging and other studies     I have personally viewed and interpreted the following studies:  Chest x-ray 3/2/2023 shows mildly increased interstitial pattern opacification hilar fullness bilaterally. Otherwise, no gross intraparenchymal or pleural abnormalities      Pulmonary function testing:   None    Assessment:  1. Suspected reactive airways disease  2. Frequent bronchitis      Plan:  • I suspect patient symptoms are related to post viral reactive airways disease.   • Trial Breo 200 daily  • Demonstrated Ellipta usage for patient  • Check full PFTs with 6-minute walk test  • Repeat chest x-ray  • Check CBC with differential  • Check [facility] allergy panel  • Patient instructed to call back with any deterioration in symptoms    Return visit in 2 to 3 months  On next visit we will evaluate symptoms, improvement with Breo, PFT results, x-ray results, CBC results, [place] allergy panel results      Abilio Espino M.D.   [facility]

## 2023-09-07 DIAGNOSIS — K21.9 GASTROESOPHAGEAL REFLUX DISEASE WITHOUT ESOPHAGITIS: ICD-10-CM

## 2023-09-07 RX ORDER — FAMOTIDINE 40 MG/1
40 TABLET, FILM COATED ORAL
Qty: 90 TABLET | Refills: 4 | Status: SHIPPED | OUTPATIENT
Start: 2023-09-07

## 2023-09-08 ENCOUNTER — PROCEDURE VISIT (OUTPATIENT)
Age: 73
End: 2023-09-08
Payer: MEDICARE

## 2023-09-08 VITALS
DIASTOLIC BLOOD PRESSURE: 81 MMHG | HEIGHT: 62 IN | WEIGHT: 187 LBS | BODY MASS INDEX: 34.41 KG/M2 | SYSTOLIC BLOOD PRESSURE: 142 MMHG | HEART RATE: 73 BPM

## 2023-09-08 DIAGNOSIS — M99.02 SEGMENTAL DYSFUNCTION OF THORACIC REGION: ICD-10-CM

## 2023-09-08 DIAGNOSIS — M54.2 NECK PAIN: ICD-10-CM

## 2023-09-08 DIAGNOSIS — M54.6 CHRONIC BILATERAL THORACIC BACK PAIN: ICD-10-CM

## 2023-09-08 DIAGNOSIS — M99.05 SEGMENTAL DYSFUNCTION OF PELVIC REGION: Primary | ICD-10-CM

## 2023-09-08 DIAGNOSIS — G89.29 CHRONIC BILATERAL THORACIC BACK PAIN: ICD-10-CM

## 2023-09-08 DIAGNOSIS — M99.03 SEGMENTAL DYSFUNCTION OF LUMBAR REGION: ICD-10-CM

## 2023-09-08 DIAGNOSIS — M54.41 CHRONIC RIGHT-SIDED LOW BACK PAIN WITH RIGHT-SIDED SCIATICA: ICD-10-CM

## 2023-09-08 DIAGNOSIS — M79.18 MYOFASCIAL PAIN: ICD-10-CM

## 2023-09-08 DIAGNOSIS — G89.29 CHRONIC RIGHT-SIDED LOW BACK PAIN WITH RIGHT-SIDED SCIATICA: ICD-10-CM

## 2023-09-08 DIAGNOSIS — M99.01 SEGMENTAL DYSFUNCTION OF CERVICAL REGION: ICD-10-CM

## 2023-09-08 DIAGNOSIS — M54.16 LUMBAR RADICULOPATHY: ICD-10-CM

## 2023-09-08 DIAGNOSIS — M99.04 SEGMENTAL DYSFUNCTION OF SACRAL REGION: ICD-10-CM

## 2023-09-08 PROCEDURE — 98941 CHIROPRACT MANJ 3-4 REGIONS: CPT | Performed by: CHIROPRACTOR

## 2023-09-08 NOTE — PROGRESS NOTES
Date of first visit: 5/3/2023      HPI:  sIabell Morrissey in for treatment left-sided low back pain. She continues with a left fourth metatarsal nondisplaced stress fracture. She is out of the walking boot has reassessment this week she did continues to get burning pain into the left foot notes ongoing back pain. Neck is stiff. VPAS  8      The following portions of the patient's history were reviewed and updated as appropriate: allergies, current medications, past family history, past medical history, past social history, past surgical history, and problem list.    Review of Systems    Physical Exam:  Pelvic obliquity elevated right versus left innominate leg inequality at this point function right parasacral region biomechanically joint dysfunction present in the right sacroiliac joint L5-S1 motion unit. Partial involvement cervical thoracic region active triggers in the right trapezius levator scapula and rhomboid. Joint dysfunction C5-C6    Assessment:   Diagnosis ICD-10-CM Associated Orders   1. Segmental dysfunction of pelvic region  M99.05       2. Lumbar radiculopathy  M54.16       3. Segmental dysfunction of sacral region  M99.04       4. Segmental dysfunction of lumbar region  M99.03       5. Chronic right-sided low back pain with right-sided sciatica  M54.41     G89.29       6. Chronic bilateral thoracic back pain  M54.6     G89.29       7. Segmental dysfunction of thoracic region  M99.02       8. Segmental dysfunction of cervical region  M99.01       9. Neck pain  M54.2       10. Myofascial pain  M79.18                 Treatment: 71289  Manipulation right innominate, sacrum, L5 L4 well-tolerated.  .    Discussion:  Reviewed home stretching with Krystina she will continue this I will see her back 3-week follow-up visit

## 2023-09-13 ENCOUNTER — PATIENT MESSAGE (OUTPATIENT)
Dept: INTERNAL MEDICINE CLINIC | Age: 73
End: 2023-09-13

## 2023-09-20 LAB
A ALTERNATA IGE QN: <0.1 KU/L
ALBUMIN SERPL-MCNC: 4.3 G/DL (ref 3.6–5.1)
ALBUMIN/GLOB SERPL: 1.7 (CALC) (ref 1–2.5)
ALP SERPL-CCNC: 96 U/L (ref 37–153)
ALT SERPL-CCNC: 15 U/L (ref 6–29)
AST SERPL-CCNC: 10 U/L (ref 10–35)
BASOPHILS # BLD AUTO: 51 CELLS/UL (ref 0–200)
BASOPHILS NFR BLD AUTO: 0.6 %
BILIRUB DIRECT SERPL-MCNC: 0.1 MG/DL
BILIRUB INDIRECT SERPL-MCNC: 0.4 MG/DL (CALC) (ref 0.2–1.2)
BILIRUB SERPL-MCNC: 0.5 MG/DL (ref 0.2–1.2)
C HERBARUM IGE QN: <0.1 KU/L
CAT DANDER IGE QN: <0.1 KU/L
CHOLEST SERPL-MCNC: 145 MG/DL
CHOLEST/HDLC SERPL: 2.5 (CALC)
COMMON RAGWEED IGE QN: <0.1 KU/L
D FARINAE IGE QN: 0.49 KU/L
DEPRECATED A ALTERNATA IGE RAST QL: 0
DEPRECATED C HERBARUM IGE RAST QL: 0
DEPRECATED CAT DANDER IGE RAST QL: 0
DEPRECATED COMMON RAGWEED IGE RAST QL: 0
DEPRECATED D FARINAE IGE RAST QL: 1
DEPRECATED DOG DANDER IGE RAST QL: 0
DEPRECATED GOOSEFOOT IGE RAST QL: 0
DEPRECATED KENT BLUE GRASS IGE RAST QL: 0
DEPRECATED TIMOTHY IGE RAST QL: 0
DEPRECATED WHITE OAK IGE RAST QL: 0
DOG DANDER IGE QN: <0.1 KU/L
EOSINOPHIL # BLD AUTO: 459 CELLS/UL (ref 15–500)
EOSINOPHIL NFR BLD AUTO: 5.4 %
ERYTHROCYTE [DISTWIDTH] IN BLOOD BY AUTOMATED COUNT: 13 % (ref 11–15)
GLOBULIN SER CALC-MCNC: 2.5 G/DL (CALC) (ref 1.9–3.7)
GOOSEFOOT IGE QN: <0.1 KU/L
HCT VFR BLD AUTO: 40.9 % (ref 35–45)
HDLC SERPL-MCNC: 59 MG/DL
HGB BLD-MCNC: 13.5 G/DL (ref 11.7–15.5)
KENT BLUE GRASS IGE QN: <0.1 KU/L
LDLC SERPL CALC-MCNC: 66 MG/DL (CALC)
LYMPHOCYTES # BLD AUTO: 3060 CELLS/UL (ref 850–3900)
LYMPHOCYTES NFR BLD AUTO: 36 %
MCH RBC QN AUTO: 27.6 PG (ref 27–33)
MCHC RBC AUTO-ENTMCNC: 33 G/DL (ref 32–36)
MCV RBC AUTO: 83.6 FL (ref 80–100)
MONOCYTES # BLD AUTO: 638 CELLS/UL (ref 200–950)
MONOCYTES NFR BLD AUTO: 7.5 %
NEUTROPHILS # BLD AUTO: 4293 CELLS/UL (ref 1500–7800)
NEUTROPHILS NFR BLD AUTO: 50.5 %
NONHDLC SERPL-MCNC: 86 MG/DL (CALC)
PLATELET # BLD AUTO: 287 THOUSAND/UL (ref 140–400)
PMV BLD REES-ECKER: 11.7 FL (ref 7.5–12.5)
PROT SERPL-MCNC: 6.8 G/DL (ref 6.1–8.1)
RBC # BLD AUTO: 4.89 MILLION/UL (ref 3.8–5.1)
TIMOTHY IGE QN: <0.1 KU/L
TRIGL SERPL-MCNC: 115 MG/DL
WBC # BLD AUTO: 8.5 THOUSAND/UL (ref 3.8–10.8)
WHITE OAK IGE QN: <0.1 KU/L

## 2023-09-21 ENCOUNTER — HOSPITAL ENCOUNTER (OUTPATIENT)
Dept: PULMONOLOGY | Facility: HOSPITAL | Age: 73
End: 2023-09-21
Attending: INTERNAL MEDICINE
Payer: MEDICARE

## 2023-09-21 ENCOUNTER — HOSPITAL ENCOUNTER (OUTPATIENT)
Dept: RADIOLOGY | Facility: HOSPITAL | Age: 73
Discharge: HOME | End: 2023-09-21
Payer: MEDICARE

## 2023-09-21 DIAGNOSIS — R05.3 CHRONIC COUGH: ICD-10-CM

## 2023-09-21 PROCEDURE — 94618 PULMONARY STRESS TESTING: CPT | Performed by: STUDENT IN AN ORGANIZED HEALTH CARE EDUCATION/TRAINING PROGRAM

## 2023-09-21 PROCEDURE — 94726 PLETHYSMOGRAPHY LUNG VOLUMES: CPT | Performed by: STUDENT IN AN ORGANIZED HEALTH CARE EDUCATION/TRAINING PROGRAM

## 2023-09-21 PROCEDURE — 94729 DIFFUSING CAPACITY: CPT

## 2023-09-21 PROCEDURE — 94760 N-INVAS EAR/PLS OXIMETRY 1: CPT

## 2023-09-21 PROCEDURE — 94761 N-INVAS EAR/PLS OXIMETRY MLT: CPT

## 2023-09-21 PROCEDURE — 94726 PLETHYSMOGRAPHY LUNG VOLUMES: CPT

## 2023-09-21 PROCEDURE — 94729 DIFFUSING CAPACITY: CPT | Performed by: STUDENT IN AN ORGANIZED HEALTH CARE EDUCATION/TRAINING PROGRAM

## 2023-09-21 PROCEDURE — 94060 EVALUATION OF WHEEZING: CPT | Performed by: STUDENT IN AN ORGANIZED HEALTH CARE EDUCATION/TRAINING PROGRAM

## 2023-09-21 PROCEDURE — 71046 X-RAY EXAM CHEST 2 VIEWS: CPT

## 2023-09-21 PROCEDURE — 94060 EVALUATION OF WHEEZING: CPT

## 2023-09-21 RX ORDER — ALBUTEROL SULFATE 2.5 MG/3ML
2.5 SOLUTION RESPIRATORY (INHALATION) ONCE
Status: COMPLETED | OUTPATIENT
Start: 2023-09-21 | End: 2023-09-21

## 2023-09-21 RX ADMIN — ALBUTEROL SULFATE 2.5 MG: 2.5 SOLUTION RESPIRATORY (INHALATION) at 10:24

## 2023-09-28 ENCOUNTER — PROCEDURE VISIT (OUTPATIENT)
Age: 73
End: 2023-09-28
Payer: MEDICARE

## 2023-09-28 VITALS
DIASTOLIC BLOOD PRESSURE: 82 MMHG | BODY MASS INDEX: 34.41 KG/M2 | HEIGHT: 62 IN | WEIGHT: 187 LBS | SYSTOLIC BLOOD PRESSURE: 132 MMHG

## 2023-09-28 DIAGNOSIS — M79.18 MYOFASCIAL PAIN: ICD-10-CM

## 2023-09-28 DIAGNOSIS — M99.03 SEGMENTAL DYSFUNCTION OF LUMBAR REGION: ICD-10-CM

## 2023-09-28 DIAGNOSIS — M54.16 LUMBAR RADICULOPATHY: ICD-10-CM

## 2023-09-28 DIAGNOSIS — M54.41 CHRONIC RIGHT-SIDED LOW BACK PAIN WITH RIGHT-SIDED SCIATICA: ICD-10-CM

## 2023-09-28 DIAGNOSIS — G89.29 CHRONIC RIGHT-SIDED LOW BACK PAIN WITH RIGHT-SIDED SCIATICA: ICD-10-CM

## 2023-09-28 DIAGNOSIS — M54.2 NECK PAIN: ICD-10-CM

## 2023-09-28 DIAGNOSIS — G89.29 CHRONIC BILATERAL THORACIC BACK PAIN: ICD-10-CM

## 2023-09-28 DIAGNOSIS — M54.6 CHRONIC BILATERAL THORACIC BACK PAIN: ICD-10-CM

## 2023-09-28 DIAGNOSIS — M99.01 SEGMENTAL DYSFUNCTION OF CERVICAL REGION: ICD-10-CM

## 2023-09-28 DIAGNOSIS — M99.04 SEGMENTAL DYSFUNCTION OF SACRAL REGION: ICD-10-CM

## 2023-09-28 DIAGNOSIS — M99.05 SEGMENTAL DYSFUNCTION OF PELVIC REGION: Primary | ICD-10-CM

## 2023-09-28 DIAGNOSIS — M99.02 SEGMENTAL DYSFUNCTION OF THORACIC REGION: ICD-10-CM

## 2023-09-28 PROCEDURE — 98941 CHIROPRACT MANJ 3-4 REGIONS: CPT | Performed by: CHIROPRACTOR

## 2023-09-28 NOTE — PROGRESS NOTES
Date of first visit: 5/3/2023      HPI:  Jaun De Souza in for treatment left-sided low back pain. She continues with a left fourth metatarsal nondisplaced stress fracture. She is out of the walking boot. Neck is stiff. VPAS  4-7      The following portions of the patient's history were reviewed and updated as appropriate: allergies, current medications, past family history, past medical history, past social history, past surgical history, and problem list.    Review of Systems    Physical Exam:  Pelvic obliquity elevated right versus left innominate leg inequality at this point function right parasacral region biomechanically joint dysfunction present in the right sacroiliac joint L5-S1 motion unit. Partial involvement cervical thoracic region active triggers in the right trapezius levator scapula and rhomboid. Joint dysfunction C5-C6    Assessment:   Diagnosis ICD-10-CM Associated Orders   1. Segmental dysfunction of pelvic region  M99.05       2. Lumbar radiculopathy  M54.16       3. Segmental dysfunction of sacral region  M99.04       4. Segmental dysfunction of lumbar region  M99.03       5. Chronic right-sided low back pain with right-sided sciatica  M54.41     G89.29       6. Chronic bilateral thoracic back pain  M54.6     G89.29       7. Segmental dysfunction of thoracic region  M99.02       8. Segmental dysfunction of cervical region  M99.01       9. Neck pain  M54.2       10. Myofascial pain  M79.18                 Treatment: 82567  Manipulation right innominate, sacrum, L5 L4 well-tolerated.  .    Discussion:  Reviewed home stretching with Krystina she will continue this I will see her back 3-week follow-up visit

## 2023-10-12 ENCOUNTER — TELEPHONE (OUTPATIENT)
Dept: INTERNAL MEDICINE CLINIC | Age: 73
End: 2023-10-12

## 2023-10-12 DIAGNOSIS — E78.2 MIXED HYPERLIPIDEMIA: ICD-10-CM

## 2023-10-12 RX ORDER — EZETIMIBE 10 MG/1
TABLET ORAL
Qty: 90 TABLET | Refills: 1 | Status: SHIPPED | OUTPATIENT
Start: 2023-10-12

## 2023-10-19 ENCOUNTER — PROCEDURE VISIT (OUTPATIENT)
Age: 73
End: 2023-10-19
Payer: MEDICARE

## 2023-10-19 VITALS — BODY MASS INDEX: 34.41 KG/M2 | WEIGHT: 187 LBS | HEIGHT: 62 IN

## 2023-10-19 DIAGNOSIS — M99.01 SEGMENTAL DYSFUNCTION OF CERVICAL REGION: ICD-10-CM

## 2023-10-19 DIAGNOSIS — M54.6 CHRONIC BILATERAL THORACIC BACK PAIN: ICD-10-CM

## 2023-10-19 DIAGNOSIS — M79.18 MYOFASCIAL PAIN: ICD-10-CM

## 2023-10-19 DIAGNOSIS — G89.29 CHRONIC BILATERAL THORACIC BACK PAIN: ICD-10-CM

## 2023-10-19 DIAGNOSIS — M99.04 SEGMENTAL DYSFUNCTION OF SACRAL REGION: ICD-10-CM

## 2023-10-19 DIAGNOSIS — M99.05 SEGMENTAL DYSFUNCTION OF PELVIC REGION: Primary | ICD-10-CM

## 2023-10-19 DIAGNOSIS — M54.16 LUMBAR RADICULOPATHY: ICD-10-CM

## 2023-10-19 DIAGNOSIS — M99.03 SEGMENTAL DYSFUNCTION OF LUMBAR REGION: ICD-10-CM

## 2023-10-19 DIAGNOSIS — M54.2 NECK PAIN: ICD-10-CM

## 2023-10-19 DIAGNOSIS — M99.02 SEGMENTAL DYSFUNCTION OF THORACIC REGION: ICD-10-CM

## 2023-10-19 DIAGNOSIS — M54.41 CHRONIC RIGHT-SIDED LOW BACK PAIN WITH RIGHT-SIDED SCIATICA: ICD-10-CM

## 2023-10-19 DIAGNOSIS — G89.29 CHRONIC RIGHT-SIDED LOW BACK PAIN WITH RIGHT-SIDED SCIATICA: ICD-10-CM

## 2023-10-19 PROCEDURE — 98941 CHIROPRACT MANJ 3-4 REGIONS: CPT | Performed by: CHIROPRACTOR

## 2023-10-19 RX ORDER — NITROFURANTOIN 25; 75 MG/1; MG/1
100 CAPSULE ORAL 2 TIMES DAILY
COMMUNITY
Start: 2023-10-14 | End: 2023-10-19

## 2023-10-19 NOTE — PROGRESS NOTES
Date of first visit: 5/3/2023      HPI:  Neris Husbands in for treatment left-sided low back pain. She continues with a left fourth metatarsal nondisplaced stress fracture. She is out of the walking boot. Neck is stiff. Despite physical therapy is returning back to Dr. Martin Garcia next week. VPAS  5-7      The following portions of the patient's history were reviewed and updated as appropriate: allergies, current medications, past family history, past medical history, past social history, past surgical history, and problem list.    Review of Systems    Physical Exam:  Pelvic obliquity elevated right versus left innominate leg inequality at this point function right parasacral region biomechanically joint dysfunction present in the right sacroiliac joint L5-S1 motion unit. Partial involvement cervical thoracic region active triggers in the right trapezius levator scapula and rhomboid. Joint dysfunction C5-C6    Assessment:   Diagnosis ICD-10-CM Associated Orders   1. Segmental dysfunction of pelvic region  M99.05       2. Lumbar radiculopathy  M54.16       3. Segmental dysfunction of sacral region  M99.04       4. Segmental dysfunction of lumbar region  M99.03       5. Chronic right-sided low back pain with right-sided sciatica  M54.41     G89.29       6. Chronic bilateral thoracic back pain  M54.6     G89.29       7. Segmental dysfunction of thoracic region  M99.02       8. Segmental dysfunction of cervical region  M99.01       9. Neck pain  M54.2       10. Myofascial pain  M79.18                 Treatment: 57044  Manipulation right innominate, sacrum, L5 L4 well-tolerated.  .    Discussion:  Reviewed home stretching with Krystina she will continue this I will see her back 3-week follow-up visit

## 2023-10-23 ENCOUNTER — TELEPHONE (OUTPATIENT)
Dept: INTERNAL MEDICINE CLINIC | Age: 73
End: 2023-10-23

## 2023-10-23 NOTE — TELEPHONE ENCOUNTER
Patient called and left message on VM that she was seen by [facility] gynecology on 10/11/23. Patient would like Dr. Brigida Lund or Apryl Fregoso to review her chart and to be informed. Per office visit, patient is going to be referred to urogynecology.     Please advise, thank you

## 2023-11-01 ENCOUNTER — PROCEDURE VISIT (OUTPATIENT)
Age: 73
End: 2023-11-01
Payer: MEDICARE

## 2023-11-01 VITALS
BODY MASS INDEX: 34.41 KG/M2 | DIASTOLIC BLOOD PRESSURE: 84 MMHG | SYSTOLIC BLOOD PRESSURE: 134 MMHG | WEIGHT: 187 LBS | HEIGHT: 62 IN

## 2023-11-01 DIAGNOSIS — M99.05 SEGMENTAL DYSFUNCTION OF PELVIC REGION: Primary | ICD-10-CM

## 2023-11-01 DIAGNOSIS — M99.02 SEGMENTAL DYSFUNCTION OF THORACIC REGION: ICD-10-CM

## 2023-11-01 DIAGNOSIS — G89.29 CHRONIC RIGHT-SIDED LOW BACK PAIN WITH RIGHT-SIDED SCIATICA: ICD-10-CM

## 2023-11-01 DIAGNOSIS — M54.16 LUMBAR RADICULOPATHY: ICD-10-CM

## 2023-11-01 DIAGNOSIS — M99.03 SEGMENTAL DYSFUNCTION OF LUMBAR REGION: ICD-10-CM

## 2023-11-01 DIAGNOSIS — M54.2 NECK PAIN: ICD-10-CM

## 2023-11-01 DIAGNOSIS — M99.01 SEGMENTAL DYSFUNCTION OF CERVICAL REGION: ICD-10-CM

## 2023-11-01 DIAGNOSIS — M79.18 MYOFASCIAL PAIN: ICD-10-CM

## 2023-11-01 DIAGNOSIS — G89.29 CHRONIC BILATERAL THORACIC BACK PAIN: ICD-10-CM

## 2023-11-01 DIAGNOSIS — M54.6 CHRONIC BILATERAL THORACIC BACK PAIN: ICD-10-CM

## 2023-11-01 DIAGNOSIS — M99.04 SEGMENTAL DYSFUNCTION OF SACRAL REGION: ICD-10-CM

## 2023-11-01 DIAGNOSIS — M54.41 CHRONIC RIGHT-SIDED LOW BACK PAIN WITH RIGHT-SIDED SCIATICA: ICD-10-CM

## 2023-11-01 PROCEDURE — 98941 CHIROPRACT MANJ 3-4 REGIONS: CPT | Performed by: CHIROPRACTOR

## 2023-11-01 NOTE — PROGRESS NOTES
Date of first visit: 5/3/2023      HPI:  Memo Odom in for treatment left-sided low back pain. She continues with a left fourth metatarsal nondisplaced stress fracture. She is out of the walking boot. Neck is stiff. VPAS  6      The following portions of the patient's history were reviewed and updated as appropriate: allergies, current medications, past family history, past medical history, past social history, past surgical history, and problem list.    Review of Systems    Physical Exam:  Pelvic obliquity elevated right versus left innominate leg inequality at this point function right parasacral region biomechanically joint dysfunction present in the right sacroiliac joint L5-S1 motion unit. Partial involvement cervical thoracic region active triggers in the right trapezius levator scapula and rhomboid. Joint dysfunction C5-C6    Assessment:   Diagnosis ICD-10-CM Associated Orders   1. Segmental dysfunction of pelvic region  M99.05       2. Lumbar radiculopathy  M54.16       3. Segmental dysfunction of sacral region  M99.04       4. Segmental dysfunction of lumbar region  M99.03       5. Chronic right-sided low back pain with right-sided sciatica  M54.41     G89.29       6. Chronic bilateral thoracic back pain  M54.6     G89.29       7. Segmental dysfunction of thoracic region  M99.02       8. Segmental dysfunction of cervical region  M99.01       9. Neck pain  M54.2       10. Myofascial pain  M79.18                 Treatment: 29405  Manipulation right innominate, sacrum, L5 L4 well-tolerated.  .    Discussion:  Reviewed home stretching with Krystina she will continue this I will see her back 3-week follow-up visit

## 2023-11-15 ENCOUNTER — OFFICE VISIT (OUTPATIENT)
Dept: INTERNAL MEDICINE CLINIC | Age: 73
End: 2023-11-15
Payer: MEDICARE

## 2023-11-15 VITALS
HEIGHT: 62 IN | OXYGEN SATURATION: 98 % | DIASTOLIC BLOOD PRESSURE: 70 MMHG | SYSTOLIC BLOOD PRESSURE: 128 MMHG | HEART RATE: 64 BPM | BODY MASS INDEX: 34.78 KG/M2 | TEMPERATURE: 98.3 F | WEIGHT: 189 LBS

## 2023-11-15 DIAGNOSIS — M17.10 ARTHRITIS OF KNEE: ICD-10-CM

## 2023-11-15 DIAGNOSIS — R05.3 CHRONIC COUGH: ICD-10-CM

## 2023-11-15 DIAGNOSIS — K21.9 GASTROESOPHAGEAL REFLUX DISEASE WITHOUT ESOPHAGITIS: Primary | ICD-10-CM

## 2023-11-15 DIAGNOSIS — F41.9 ANXIETY: ICD-10-CM

## 2023-11-15 DIAGNOSIS — E03.9 HYPOTHYROIDISM, ADULT: ICD-10-CM

## 2023-11-15 DIAGNOSIS — B49 FUNGAL INFECTION: ICD-10-CM

## 2023-11-15 DIAGNOSIS — E78.5 HYPERLIPIDEMIA, UNSPECIFIED HYPERLIPIDEMIA TYPE: ICD-10-CM

## 2023-11-15 DIAGNOSIS — I10 ESSENTIAL HYPERTENSION: ICD-10-CM

## 2023-11-15 PROCEDURE — G0439 PPPS, SUBSEQ VISIT: HCPCS | Performed by: INTERNAL MEDICINE

## 2023-11-15 PROCEDURE — 99214 OFFICE O/P EST MOD 30 MIN: CPT | Performed by: INTERNAL MEDICINE

## 2023-11-15 RX ORDER — NYSTATIN 100000 [USP'U]/G
POWDER TOPICAL 2 TIMES DAILY
Qty: 60 G | Refills: 2 | Status: SHIPPED | OUTPATIENT
Start: 2023-11-15

## 2023-11-15 RX ORDER — LORAZEPAM 0.5 MG/1
0.5 TABLET ORAL
Qty: 30 TABLET | Refills: 0 | Status: SHIPPED | OUTPATIENT
Start: 2023-11-15

## 2023-11-15 RX ORDER — VIBEGRON 75 MG/1
TABLET, FILM COATED ORAL
COMMUNITY
Start: 2023-11-07

## 2023-11-15 NOTE — PROGRESS NOTES
Assessment and Plan:     Problem List Items Addressed This Visit          Digestive    Gastroesophageal reflux disease without esophagitis - Primary       Endocrine    Hypothyroidism, adult       Cardiovascular and Mediastinum    Essential hypertension       Musculoskeletal and Integument    Arthritis of knee       Other    Hyperlipidemia    Chronic cough     Other Visit Diagnoses       Fungal infection        on fungal  ceam    Anxiety        improved on meds            Depression Screening and Follow-up Plan: Patient was screened for depression during today's encounter. They screened negative with a PHQ-2 score of 0. Preventive health issues were discussed with patient, and age appropriate screening tests were ordered as noted in patient's After Visit Summary. Personalized health advice and appropriate referrals for health education or preventive services given if needed, as noted in patient's After Visit Summary. History of Present Illness:     Patient presents for a Medicare Wellness Visit    Feels rel . well ,still coughing saw pulmonary . Using Breo on a prn  basis. Asthma equivalent cough. Eosinophils are now normal       Patient Care Team:  Sam Srivastava MD as PCP - General  Armando Castellano MD (Gastroenterology)  Jeff Najera MD (Ophthalmology)  Toi Villeda MD (Cardiology)  David Carroll MD (Breast Surgery)  Meli Burks MD (Obstetrics and Gynecology)  Rupesh Lindo MD (Dermatology)  Joaquín Gordon DO (Orthopedic Surgery)  Marcelo Melton DC     Review of Systems:     Review of Systems   Constitutional:  Positive for fatigue. Negative for appetite change, chills, diaphoresis, fever and unexpected weight change. HENT:  Positive for postnasal drip (useing spray) and rhinorrhea. Negative for congestion, hearing loss, mouth sores and nosebleeds. Eyes:  Positive for visual disturbance.         Sees Dr Sam Major and needs cataract surgery   Respiratory: Positive for cough and shortness of breath. Negative for choking, chest tightness, wheezing and stridor. Cardiovascular:  Positive for leg swelling. Negative for chest pain and palpitations. Gastrointestinal:  Negative for abdominal distention, abdominal pain, anal bleeding, blood in stool, constipation, diarrhea, nausea and rectal pain. Brina Walton has hiatal hernia   Genitourinary:  Negative for difficulty urinating, flank pain and frequency. Musculoskeletal:  Positive for arthralgias and back pain (knees , feet and ankles and hands). Negative for gait problem and myalgias. Fingers are not straight  On meloxicon   Skin: Negative. Neurological:  Negative for dizziness, facial asymmetry, light-headedness, numbness and headaches. Psychiatric/Behavioral: Negative.         Problem List:     Patient Active Problem List   Diagnosis   • Lower abdominal pain   • Allergic sinusitis   • Arthritis of knee   • Hyperlipidemia   • Hypothyroidism, adult   • Reflux esophagitis   • Gastroesophageal reflux disease without esophagitis   • Paronychia of toenail, left   • Cystitis   • Essential hypertension   • Arthritis   • Painful urination   • Paroxysmal atrial tachycardia   • Obesity, morbid (HCC)   • Nausea and vomiting   • Change in bowel habits   • BRBPR (bright red blood per rectum)   • S/P total knee arthroplasty, right   • Choking   • Oropharyngeal dysphagia   • Chronic cough   • Reactive airway disease      Past Medical and Surgical History:     Past Medical History:   Diagnosis Date   • Allergic Unknown   • Anxiety     Last Assessed: [id number]   • Arthritis     Last Assessed: [id number]   • Colon polyp    • Disease of thyroid gland    • GERD (gastroesophageal reflux disease)     Last Assessed: 31Oct2017   • Headache(784.0) Used ri have migraines in the 70’s abd early 80’s   • Hypothyroidism, adult 10/31/2017   • Insomnia    • Osteoporosis     Last Assessed: 31Oct2017   • Reflux esophagitis    • Seasonal allergies    • Urinary incontinence     Last Assessed: 2017     Past Surgical History:   Procedure Laterality Date   • BREAST SURGERY      biopsy   • CHOLECYSTECTOMY     • COLONOSCOPY     • EXPLORATORY LAPAROTOMY     • GALLBLADDER SURGERY      Last Assessed: [id number]   • INCISIONAL BREAST BIOPSY      Last Assessed: [id number]   • JOINT REPLACEMENT Right    • KNEE SURGERY      Last Assessed: [id number]   • CA INSERTION/RPLCMT PERIPHERAL/GASTRIC NPGR N/A 2016    Procedure:  REMOVAL IPG BATTERY ;  Surgeon: Oseas Maher MD;  Location: [facility];  Service: UroGynecology          • REPLACEMENT TOTAL KNEE Right 2019   • ROOT CANAL  2019   • SHOULDER SURGERY Left    • SHOULDER SURGERY      Last Assessed: [id number]   • TONSILLECTOMY      last Assessed: 2017   • US GUIDED INJECTION FOR RESEARCH STUDY  10/03/2014   • WISDOM TOOTH EXTRACTION      Last Assessed: 2017      Family History:     Family History   Problem Relation Age of Onset   • Lupus Mother    • Other Mother         Cardiac Disorder   • Heart disease Mother    • Arthritis Mother            • Other Father         Cardiac Disorder   • Diabetes Father         adult diabetes   • Heart disease Father    • Breast cancer Maternal Aunt    • Breast cancer Cousin    • Vision loss Maternal Aunt       Social History:     Social History     Socioeconomic History   • Marital status:      Spouse name: None   • Number of children: None   • Years of education: None   • Highest education level: None   Occupational History   • None   Tobacco Use   • Smoking status: Never   • Smokeless tobacco: Never   Vaping Use   • Vaping Use: Never used   Substance and Sexual Activity   • Alcohol use: Yes     Comment: social drinking only   • Drug use: Never   • Sexual activity: Yes     Partners: Male     Birth control/protection: Post-menopausal, None   Other Topics Concern   • None   Social History Narrative   • None     Social Determinants of Health     Financial Resource Strain: Low Risk  (11/8/2023)    Overall Financial Resource Strain (CARDIA)    • Difficulty of Paying Living Expenses: Not hard at all   Food Insecurity: Not on file   Transportation Needs: No Transportation Needs (11/8/2023)    PRAPARE - Transportation    • Lack of Transportation (Medical): No    • Lack of Transportation (Non-Medical): No   Physical Activity: Not on file   Stress: Not on file   Social Connections: Not on file   Intimate Partner Violence: Not on file   Housing Stability: Not on file      Medications and Allergies:     Current Outpatient Medications   Medication Sig Dispense Refill   • Acetaminophen (TYLENOL 8 HOUR ARTHRITIS PAIN PO) Take by mouth As needed     • aspirin 81 MG tablet Take 81 mg by mouth daily. • cholecalciferol (VITAMIN D3) 1,000 units tablet Take 1,000 Units by mouth daily      • CRANBERRY-VITAMIN C PO Take 500 mg by mouth in the morning Patient takes 2     • cyanocobalamin (VITAMIN B-12) 1,000 mcg tablet Take 1,000 mcg by mouth daily     • Diclofenac Sodium (VOLTAREN) 1 % APPLY 4 G TOPICALLY 2 (TWO) TIMES A DAY AS NEEDED (KNEE PAIN) 100 g 1   • Elderberry 500 MG CAPS 2 capsules in the morning     • esomeprazole (NexIUM) 20 mg capsule Take 20 mg by mouth 2 (two) times a day      • Estradiol 10 % CREA      • ezetimibe (ZETIA) 10 mg tablet TAKE 1 TABLET BY MOUTH DAILY 90 tablet 1   • famotidine (PEPCID) 40 MG tablet TAKE 1 TABLET BY MOUTH DAILY AT BEDTIME 90 tablet 4   • fluticasone (FLONASE) 50 mcg/act nasal spray 1 spray into each nostril daily 16 mL 5   • fluticasone-vilanterol (Breo Ellipta) 200-25 mcg/actuation inhaler Inhale 1 puff daily Rinse mouth after use.  60 blister 3   • Gemtesa 75 MG TABS      • hydrocortisone (ANUSOL-HC) 25 mg suppository Insert 1 suppository (25 mg total) into the rectum 2 (two) times a day as needed for hemorrhoids for up to 6 days 12 suppository 1   • levothyroxine 25 mcg tablet Take 1 tablet (25 mcg total) by mouth daily 90 tablet 1   • LORazepam (ATIVAN) 0.5 mg tablet Take 1 tablet (0.5 mg total) by mouth daily at bedtime 30 tablet 0   • Magnesium Oxide -Mg Supplement (Mag-200) 200 MG TABS      • meloxicam (MOBIC) 7.5 mg tablet Take 7.5 mg by mouth daily     • NON FORMULARY CBD cream, applies to B/L knees     • nystatin (MYCOSTATIN) cream Apply topically 2 (two) times a day 30 g 2   • nystatin (MYCOSTATIN) powder Apply topically 2 (two) times a day 60 g 2   • Olopatadine HCl (PATADAY OP) Apply to eye if needed     • ondansetron (ZOFRAN) 4 mg tablet Take 1 tablet (4 mg total) by mouth every 8 (eight) hours as needed for nausea or vomiting 20 tablet 0   • rosuvastatin (CRESTOR) 10 MG tablet Take 1 tablet (10 mg total) by mouth 3 (three) times a week Use three times a week 36 tablet 3   • triamcinolone (KENALOG) 0.1 % cream Apply topically 2 (two) times a day 45 g 0   • Zinc 100 MG TABS prn       No current facility-administered medications for this visit.      Allergies   Allergen Reactions   • Acetazolamide Hives   • Fluticasone GI Intolerance   • Cephalexin Itching   • Sulfa Antibiotics Rash, Edema and Swelling      Immunizations:     Immunization History   Administered Date(s) Administered   • COVID-19 PFIZER VACCINE 0.3 ML IM 02/15/2021, 03/08/2021   • COVID-19 Pfizer vac (Bennie-sucrose, gray cap) 12 yr+ IM 01/28/2022   • Influenza Split High Dose Preservative Free IM 10/31/2017, 12/14/2020   • Influenza, high dose seasonal 0.7 mL 09/20/2018, 11/26/2019, 12/14/2020, 10/06/2021, 11/02/2022   • Pneumococcal Conjugate 13-Valent 11/01/2018   • Pneumococcal Polysaccharide PPV23 12/11/2019   • influenza, trivalent, adjuvanted 09/20/2018, 11/26/2019, 12/14/2020, 10/06/2021      Health Maintenance:         Topic Date Due   • Breast Cancer Screening: Mammogram  01/27/2024   • Hepatitis C Screening  Completed   • Colorectal Cancer Screening  Discontinued         Topic Date Due   • COVID-19 Vaccine (4 - Pfizer series) 03/25/2022   • Influenza Vaccine (1) 09/01/2023      Medicare Screening Tests and Risk Assessments:     Peri Irby is here for her Subsequent Wellness visit. Last Medicare Wellness visit information reviewed, patient interviewed and updates made to the record today. Health Risk Assessment:   Patient rates overall health as very good. Patient feels that their physical health rating is same. Patient is very satisfied with their life. Eyesight was rated as same. Hearing was rated as same. Patient feels that their emotional and mental health rating is same. Patients states they are never, rarely angry. Patient states they are often unusually tired/fatigued. Pain experienced in the last 7 days has been some. Patient's pain rating has been 6/10. Patient states that she has experienced weight loss or gain in last 6 months. less than ten lbs. but gaining and been trying to loose weight    Depression Screening:   PHQ-2 Score: 0      Fall Risk Screening: In the past year, patient has experienced: history of falling in past year    Injured during fall?: No    Feels unsteady when standing or walking?: No    Worried about falling?: No      Urinary Incontinence Screening:   Patient has leaked urine accidently in the last six months. being treated again by a [facility] specialist    Home Safety:  Patient does not have trouble with stairs inside or outside of their home. Patient has working smoke alarms and has working carbon monoxide detector. Home safety hazards include: none. Nutrition:   Current diet is Regular. 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 OTC medications include: see medication list. Patient is able to manage medications. Activities of Daily Living (ADLs)/Instrumental Activities of Daily Living (IADLs):   Walk and transfer into and out of bed and chair?: Yes  Dress and groom yourself?: Yes    Bathe or shower yourself?: Yes    Feed yourself? Yes  Do your laundry/housekeeping?: Yes  Manage your money, pay your bills and track your expenses?: Yes  Make your own meals?: Yes    Do your own shopping?: Yes    Previous Hospitalizations:   Any hospitalizations or ED visits within the last 12 months?: Yes    How many hospitalizations have you had in the last year?: 1-2    Hospitalization Comments: UTI at [facility] in [address]:   Living will: Yes    Durable POA for healthcare: Yes    Advanced directive: Yes    Advanced directive counseling given: Yes    End of Life Decisions reviewed with patient: Yes    Provider agrees with end of life decisions: Yes      Cognitive Screening:   Provider or family/friend/caregiver concerned regarding cognition?: No    PREVENTIVE SCREENINGS      Cardiovascular Screening:    General: Screening Not Indicated and History Lipid Disorder      Diabetes Screening:     General: Screening Current      Colorectal Cancer Screening:     General: Screening Current      Breast Cancer Screening:     General: Screening Current      Cervical Cancer Screening:    General: Screening Not Indicated      Osteoporosis Screening:    General: Screening Current      Abdominal Aortic Aneurysm (AAA) Screening:        General: Screening Not Indicated      Lung Cancer Screening:     General: Screening Not Indicated      Hepatitis C Screening:    General: Screening Current    Screening, Brief Intervention, and Referral to Treatment (SBIRT)    Screening  Typical number of drinks in a day: 0  Typical number of drinks in a week: 0  Interpretation: Low risk drinking behavior. AUDIT-C Screenin) How often did you have a drink containing alcohol in the past year? monthly or less  2) How many drinks did you have on a typical day when you were drinking in the past year?  1 to 2  3) How often did you have 6 or more drinks on one occasion in the past year? never    AUDIT-C Score: 1  Interpretation: Score 0-2 (female): Negative screen for alcohol misuse    Single Item Drug Screening:  How often have you used an illegal drug (including marijuana) or a prescription medication for non-medical reasons in the past year? never    Single Item Drug Screen Score: 0  Interpretation: Negative screen for possible drug use disorder    No results found. Physical Exam:     /70 (BP Location: Left arm, Patient Position: Sitting, Cuff Size: Large)   Pulse 64   Temp 98.3 °F (36.8 °C) (Temporal)   Ht 5' 2" (1.575 m)   Wt 85.7 kg (189 lb)   SpO2 98%   BMI 34.57 kg/m²     Physical Exam  Vitals reviewed. Constitutional:       Appearance: Normal appearance. She is obese. HENT:      Right Ear: External ear normal. There is no impacted cerumen. Left Ear: External ear normal. There is no impacted cerumen. Nose: No rhinorrhea. Mouth/Throat:      Pharynx: No posterior oropharyngeal erythema. Cardiovascular:      Rate and Rhythm: Normal rate. Pulses: Normal pulses. Heart sounds: Normal heart sounds. No murmur heard. Pulmonary:      Effort: No respiratory distress. Breath sounds: No wheezing or rhonchi. Abdominal:      General: There is no distension. Palpations: There is no mass. Tenderness: There is no abdominal tenderness. Hernia: No hernia is present. Musculoskeletal:         General: No swelling or tenderness. Skin:     Coloration: Skin is not jaundiced or pale. Findings: No bruising. Neurological:      Mental Status: She is alert. Psychiatric:         Mood and Affect: Mood normal.         Behavior: Behavior normal.         Thought Content:  Thought content normal.         Judgment: Judgment normal.        Makenna Tee MD

## 2023-11-15 NOTE — PATIENT INSTRUCTIONS
Medicare Preventive Visit Patient Instructions  Thank you for completing your Welcome to Medicare Visit or Medicare Annual Wellness Visit today. Your next wellness visit will be due in one year (11/15/2024). The screening/preventive services that you may require over the next 5-10 years are detailed below. Some tests may not apply to you based off risk factors and/or age. Screening tests ordered at today's visit but not completed yet may show as past due. Also, please note that scanned in results may not display below. Preventive Screenings:  Service Recommendations Previous Testing/Comments   Colorectal Cancer Screening  * Colonoscopy    * Fecal Occult Blood Test (FOBT)/Fecal Immunochemical Test (FIT)  * Fecal DNA/Cologuard Test  * Flexible Sigmoidoscopy Age: 43-73 years old   Colonoscopy: every 10 years (may be performed more frequently if at higher risk)  OR  FOBT/FIT: every 1 year  OR  Cologuard: every 3 years  OR  Sigmoidoscopy: every 5 years  Screening may be recommended earlier than age 39 if at higher risk for colorectal cancer. Also, an individualized decision between you and your healthcare provider will decide whether screening between the ages of 77-80 would be appropriate. Colonoscopy: 01/20/2023  FOBT/FIT: Not on file  Cologuard: Not on file  Sigmoidoscopy: Not on file    Screening Current     Breast Cancer Screening Age: 36 years old  Frequency: every 1-2 years  Not required if history of left and right mastectomy Mammogram: 01/27/2023    Screening Current   Cervical Cancer Screening Between the ages of 21-29, pap smear recommended once every 3 years. Between the ages of 32-69, can perform pap smear with HPV co-testing every 5 years.    Recommendations may differ for women with a history of total hysterectomy, cervical cancer, or abnormal pap smears in past. Pap Smear: Not on file    Screening Not Indicated   Hepatitis C Screening Once for adults born between 1945 and 1965  More frequently in patients at high risk for Hepatitis C Hep C Antibody: 03/01/2023    Screening Current   Diabetes Screening 1-2 times per year if you're at risk for diabetes or have pre-diabetes Fasting glucose: No results in last 5 years (No results in last 5 years)  A1C: 5.6 % of total Hgb (12/20/2021)  Screening Current   Cholesterol Screening Once every 5 years if you don't have a lipid disorder. May order more often based on risk factors. Lipid panel: 09/19/2023    Screening Not Indicated  History Lipid Disorder     Other Preventive Screenings Covered by Medicare:  Abdominal Aortic Aneurysm (AAA) Screening: covered once if your at risk. You're considered to be at risk if you have a family history of AAA. Lung Cancer Screening: covers low dose CT scan once per year if you meet all of the following conditions: (1) Age 48-67; (2) No signs or symptoms of lung cancer; (3) Current smoker or have quit smoking within the last 15 years; (4) You have a tobacco smoking history of at least 20 pack years (packs per day multiplied by number of years you smoked); (5) You get a written order from a healthcare provider. Glaucoma Screening: covered annually if you're considered high risk: (1) You have diabetes OR (2) Family history of glaucoma OR (3)  aged 48 and older OR (3)  American aged 72 and older  Osteoporosis Screening: covered every 2 years if you meet one of the following conditions: (1) You're estrogen deficient and at risk for osteoporosis based off medical history and other findings; (2) Have a vertebral abnormality; (3) On glucocorticoid therapy for more than 3 months; (4) Have primary hyperparathyroidism; (5) On osteoporosis medications and need to assess response to drug therapy. Last bone density test (DXA Scan): 12/12/2022. HIV Screening: covered annually if you're between the age of 14-79. Also covered annually if you are younger than 13 and older than 72 with risk factors for HIV infection.  For pregnant patients, it is covered up to 3 times per pregnancy. Immunizations:  Immunization Recommendations   Influenza Vaccine Annual influenza vaccination during flu season is recommended for all persons aged >= 6 months who do not have contraindications   Pneumococcal Vaccine   * Pneumococcal conjugate vaccine = PCV13 (Prevnar 13), PCV15 (Vaxneuvance), PCV20 (Prevnar 20)  * Pneumococcal polysaccharide vaccine = PPSV23 (Pneumovax) Adults 45-97 yo with certain risk factors or if 69+ yo  If never received any pneumonia vaccine: recommend Prevnar 20 (PCV20)  Give PCV20 if previously received 1 dose of PCV13 or PPSV23   Hepatitis B Vaccine 3 dose series if at intermediate or high risk (ex: diabetes, end stage renal disease, liver disease)   Respiratory syncytial virus (RSV) Vaccine - COVERED BY MEDICARE PART D  * RSVPreF3 (Arexvy) CDC recommends that adults 61years of age and older may receive a single dose of RSV vaccine using shared clinical decision-making (SCDM)   Tetanus (Td) Vaccine - COST NOT COVERED BY MEDICARE PART B Following completion of primary series, a booster dose should be given every 10 years to maintain immunity against tetanus. Td may also be given as tetanus wound prophylaxis. Tdap Vaccine - COST NOT COVERED BY MEDICARE PART B Recommended at least once for all adults. For pregnant patients, recommended with each pregnancy. Shingles Vaccine (Shingrix) - COST NOT COVERED BY MEDICARE PART B  2 shot series recommended in those 19 years and older who have or will have weakened immune systems or those 50 years and older     Health Maintenance Due:      Topic Date Due   • Breast Cancer Screening: Mammogram  01/27/2024   • Hepatitis C Screening  Completed   • Colorectal Cancer Screening  Discontinued     Immunizations Due:      Topic Date Due   • COVID-19 Vaccine (4 - Pfizer series) 03/25/2022   • Influenza Vaccine (1) 09/01/2023     Advance Directives   What are advance directives?   Advance directives are legal documents that state your wishes and plans for medical care. These plans are made ahead of time in case you lose your ability to make decisions for yourself. Advance directives can apply to any medical decision, such as the treatments you want, and if you want to donate organs. What are the types of advance directives? There are many types of advance directives, and each state has rules about how to use them. You may choose a combination of any of the following:  Living will: This is a written record of the treatment you want. You can also choose which treatments you do not want, which to limit, and which to stop at a certain time. This includes surgery, medicine, IV fluid, and tube feedings. Durable power of  for [facility]): This is a written record that states who you want to make healthcare choices for you when you are unable to make them for yourself. This person, called a proxy, is usually a family member or a friend. You may choose more than 1 proxy. Do not resuscitate (DNR) order:  A DNR order is used in case your heart stops beating or you stop breathing. It is a request not to have certain forms of treatment, such as CPR. A DNR order may be included in other types of advance directives. Medical directive: This covers the care that you want if you are in a coma, near death, or unable to make decisions for yourself. You can list the treatments you want for each condition. Treatment may include pain medicine, surgery, blood transfusions, dialysis, IV or tube feedings, and a ventilator (breathing machine). Values history: This document has questions about your views, beliefs, and how you feel and think about life. This information can help others choose the care that you would choose. Why are advance directives important? An advance directive helps you control your care. Although spoken wishes may be used, it is better to have your wishes written down.  Spoken wishes can be misunderstood, or not followed. Treatments may be given even if you do not want them. An advance directive may make it easier for your family to make difficult choices about your care. Fall Prevention    Fall prevention  includes ways to make your home and other areas safer. It also includes ways you can move more carefully to prevent a fall. Health conditions that cause changes in your blood pressure, vision, or muscle strength and coordination may increase your risk for falls. Medicines may also increase your risk for falls if they make you dizzy, weak, or sleepy. Fall prevention tips:   Stand or sit up slowly. Use assistive devices as directed. Wear shoes that fit well and have soles that . Wear a personal alarm. Stay active. Manage your medical conditions. Home Safety Tips:  Add items to prevent falls in the bathroom. Keep paths clear. Install bright lights in your home. Keep items you use often on shelves within reach. Paint or place reflective tape on the edges of your stairs. Urinary Incontinence   Urinary incontinence (UI)  is when you lose control of your bladder. UI develops because your bladder cannot store or empty urine properly. The 3 most common types of UI are stress incontinence, urge incontinence, or both. Medicines:   May be given to help strengthen your bladder control. Report any side effects of medication to your healthcare provider. Do pelvic muscle exercises often:  Your pelvic muscles help you stop urinating. Squeeze these muscles tight for 5 seconds, then relax for 5 seconds. Gradually work up to squeezing for 10 seconds. Do 3 sets of 15 repetitions a day, or as directed. This will help strengthen your pelvic muscles and improve bladder control. Train your bladder:  Go to the bathroom at set times, such as every 2 hours, even if you do not feel the urge to go. You can also try to hold your urine when you feel the urge to go.  For example, hold your urine for 5 minutes when you feel the urge to go. As that becomes easier, hold your urine for 10 minutes. Self-care:   Keep a UI record. Write down how often you leak urine and how much you leak. Make a note of what you were doing when you leaked urine. Drink liquids as directed. You may need to limit the amount of liquid you drink to help control your urine leakage. Do not drink any liquid right before you go to bed. Limit or do not have drinks that contain caffeine or alcohol. Prevent constipation. Eat a variety of high-fiber foods. Good examples are high-fiber cereals, beans, vegetables, and whole-grain breads. Walking is the best way to trigger your intestines to have a bowel movement. Exercise regularly and maintain a healthy weight. Weight loss and exercise will decrease pressure on your bladder and help you control your leakage. Use a catheter as directed  to help empty your bladder. A catheter is a tiny, plastic tube that is put into your bladder to drain your urine. Go to behavior therapy as directed. Behavior therapy may be used to help you learn to control your urge to urinate. Weight Management   Why it is important to manage your weight:  Being overweight increases your risk of health conditions such as heart disease, high blood pressure, type 2 diabetes, and certain types of cancer. It can also increase your risk for osteoarthritis, sleep apnea, and other respiratory problems. Aim for a slow, steady weight loss. Even a small amount of weight loss can lower your risk of health problems. How to lose weight safely:  A safe and healthy way to lose weight is to eat fewer calories and get regular exercise. You can lose up about 1 pound a week by decreasing the number of calories you eat by 500 calories each day. Healthy meal plan for weight management:  A healthy meal plan includes a variety of foods, contains fewer calories, and helps you stay healthy.  A healthy meal plan includes the following:  Eat whole-grain foods more often. A healthy meal plan should contain fiber. Fiber is the part of grains, fruits, and vegetables that is not broken down by your body. Whole-grain foods are healthy and provide extra fiber in your diet. Some examples of whole-grain foods are whole-wheat breads and pastas, oatmeal, brown rice, and bulgur. Eat a variety of vegetables every day. Include dark, leafy greens such as spinach, kale, rosaline greens, and mustard greens. Eat yellow and orange vegetables such as carrots, sweet potatoes, and winter squash. Eat a variety of fruits every day. Choose fresh or canned fruit (canned in its own juice or light syrup) instead of juice. Fruit juice has very little or no fiber. Eat low-fat dairy foods. Drink fat-free (skim) milk or 1% milk. Eat fat-free yogurt and low-fat cottage cheese. Try low-fat cheeses such as mozzarella and other reduced-fat cheeses. Choose meat and other protein foods that are low in fat. Choose beans or other legumes such as split peas or lentils. Choose fish, skinless poultry (chicken or turkey), or lean cuts of red meat (beef or pork). Before you cook meat or poultry, cut off any visible fat. Use less fat and oil. Try baking foods instead of frying them. Add less fat, such as margarine, sour cream, regular salad dressing and mayonnaise to foods. Eat fewer high-fat foods. Some examples of high-fat foods include french fries, doughnuts, ice cream, and cakes. Eat fewer sweets. Limit foods and drinks that are high in sugar. This includes candy, cookies, regular soda, and sweetened drinks. Exercise:  Exercise at least 30 minutes per day on most days of the week. Some examples of exercise include walking, biking, dancing, and swimming. You can also fit in more physical activity by taking the stairs instead of the elevator or parking farther away from stores. Ask your healthcare provider about the best exercise plan for you. © Copyright [employer] 2018 Information is for End User's use only and may not be sold, redistributed or otherwise used for commercial purposes.  All illustrations and images included in CareNotes® are the copyrighted property of [email or web address] or [address] [place] [address]

## 2023-11-20 ENCOUNTER — PROCEDURE VISIT (OUTPATIENT)
Age: 73
End: 2023-11-20
Payer: MEDICARE

## 2023-11-20 VITALS — WEIGHT: 189 LBS | HEIGHT: 62 IN | BODY MASS INDEX: 34.78 KG/M2

## 2023-11-20 DIAGNOSIS — G89.29 CHRONIC RIGHT-SIDED LOW BACK PAIN WITH RIGHT-SIDED SCIATICA: ICD-10-CM

## 2023-11-20 DIAGNOSIS — G89.29 CHRONIC BILATERAL THORACIC BACK PAIN: ICD-10-CM

## 2023-11-20 DIAGNOSIS — M54.6 CHRONIC BILATERAL THORACIC BACK PAIN: ICD-10-CM

## 2023-11-20 DIAGNOSIS — M54.2 NECK PAIN: ICD-10-CM

## 2023-11-20 DIAGNOSIS — M99.04 SEGMENTAL DYSFUNCTION OF SACRAL REGION: ICD-10-CM

## 2023-11-20 DIAGNOSIS — M99.03 SEGMENTAL DYSFUNCTION OF LUMBAR REGION: ICD-10-CM

## 2023-11-20 DIAGNOSIS — M99.05 SEGMENTAL DYSFUNCTION OF PELVIC REGION: Primary | ICD-10-CM

## 2023-11-20 DIAGNOSIS — M54.16 LUMBAR RADICULOPATHY: ICD-10-CM

## 2023-11-20 DIAGNOSIS — M99.02 SEGMENTAL DYSFUNCTION OF THORACIC REGION: ICD-10-CM

## 2023-11-20 DIAGNOSIS — M79.18 MYOFASCIAL PAIN: ICD-10-CM

## 2023-11-20 DIAGNOSIS — M54.41 CHRONIC RIGHT-SIDED LOW BACK PAIN WITH RIGHT-SIDED SCIATICA: ICD-10-CM

## 2023-11-20 DIAGNOSIS — M99.01 SEGMENTAL DYSFUNCTION OF CERVICAL REGION: ICD-10-CM

## 2023-11-20 PROCEDURE — 98941 CHIROPRACT MANJ 3-4 REGIONS: CPT | Performed by: CHIROPRACTOR

## 2023-11-20 NOTE — PROGRESS NOTES
Date of first visit: 5/3/2023      HPI:  Noelle Albert in for treatment left-sided low back pain. Neck is stiff. VPAS  3-6      The following portions of the patient's history were reviewed and updated as appropriate: allergies, current medications, past family history, past medical history, past social history, past surgical history, and problem list.    Review of Systems    Physical Exam:  Pelvic obliquity elevated right versus left innominate leg inequality at this point function right parasacral region biomechanically joint dysfunction present in the right sacroiliac joint L5-S1 motion unit. Partial involvement cervical thoracic region active triggers in the right trapezius levator scapula and rhomboid. Joint dysfunction C5-C6    Assessment:   Diagnosis ICD-10-CM Associated Orders   1. Segmental dysfunction of pelvic region  M99.05       2. Lumbar radiculopathy  M54.16       3. Segmental dysfunction of sacral region  M99.04       4. Segmental dysfunction of lumbar region  M99.03       5. Chronic right-sided low back pain with right-sided sciatica  M54.41     G89.29       6. Chronic bilateral thoracic back pain  M54.6     G89.29       7. Segmental dysfunction of thoracic region  M99.02       8. Segmental dysfunction of cervical region  M99.01       9. Neck pain  M54.2       10. Myofascial pain  M79.18                 Treatment: 28384  Manipulation right innominate, sacrum, L5 L4 well-tolerated.  .    Discussion:  Reviewed home stretching with Krystina she will continue this I will see her back 3-week follow-up visit

## 2024-03-27 ENCOUNTER — NEW REFERRAL (OUTPATIENT)
Dept: URBAN - METROPOLITAN AREA CLINIC 6 | Facility: CLINIC | Age: 74
End: 2024-03-27

## 2024-03-27 DIAGNOSIS — H25.813: ICD-10-CM

## 2024-03-27 DIAGNOSIS — H47.091: ICD-10-CM

## 2024-03-27 LAB
MRI SCAN: NORMAL
MRI SCAN: NORMAL

## 2024-03-27 PROCEDURE — 99204 OFFICE O/P NEW MOD 45 MIN: CPT

## 2024-03-27 ASSESSMENT — VISUAL ACUITY
OS_PH: 20/40
OD_PH: 20/80+1
OD_CC: 20/100
OS_CC: 20/40-2
OS_GLARE: 20/60

## 2024-03-27 ASSESSMENT — TONOMETRY
OD_IOP_MMHG: 15
OS_IOP_MMHG: 15

## 2024-03-27 ASSESSMENT — KERATOMETRY
OD_K1POWER_DIOPTERS: 45.25
OD_AXISANGLE2_DEGREES: 103
OS_K2POWER_DIOPTERS: 46.75
OS_AXISANGLE_DEGREES: 171
OS_K1POWER_DIOPTERS: 45.75
OD_K2POWER_DIOPTERS: 4600
OS_AXISANGLE2_DEGREES: 81
OD_AXISANGLE_DEGREES: 13